# Patient Record
Sex: FEMALE | Race: WHITE | NOT HISPANIC OR LATINO | Employment: OTHER | URBAN - METROPOLITAN AREA
[De-identification: names, ages, dates, MRNs, and addresses within clinical notes are randomized per-mention and may not be internally consistent; named-entity substitution may affect disease eponyms.]

---

## 2017-04-13 ENCOUNTER — HOSPITAL ENCOUNTER (OUTPATIENT)
Dept: LAB | Facility: MEDICAL CENTER | Age: 77
End: 2017-04-13
Attending: NURSE PRACTITIONER
Payer: MEDICARE

## 2017-04-13 LAB
25(OH)D3 SERPL-MCNC: 30 NG/ML (ref 30–100)
ALBUMIN SERPL BCP-MCNC: 3.6 G/DL (ref 3.2–4.9)
ALBUMIN/GLOB SERPL: 1.3 G/DL
ALP SERPL-CCNC: 73 U/L (ref 30–99)
ALT SERPL-CCNC: 8 U/L (ref 2–50)
ANION GAP SERPL CALC-SCNC: 10 MMOL/L (ref 0–11.9)
AST SERPL-CCNC: 16 U/L (ref 12–45)
BASOPHILS # BLD AUTO: 1.6 % (ref 0–1.8)
BASOPHILS # BLD: 0.06 K/UL (ref 0–0.12)
BILIRUB SERPL-MCNC: 0.7 MG/DL (ref 0.1–1.5)
BUN SERPL-MCNC: 23 MG/DL (ref 8–22)
CALCIUM SERPL-MCNC: 9.1 MG/DL (ref 8.5–10.5)
CHLORIDE SERPL-SCNC: 108 MMOL/L (ref 96–112)
CHOLEST SERPL-MCNC: 163 MG/DL (ref 100–199)
CO2 SERPL-SCNC: 27 MMOL/L (ref 20–33)
CREAT SERPL-MCNC: 0.88 MG/DL (ref 0.5–1.4)
EOSINOPHIL # BLD AUTO: 0.17 K/UL (ref 0–0.51)
EOSINOPHIL NFR BLD: 4.4 % (ref 0–6.9)
ERYTHROCYTE [DISTWIDTH] IN BLOOD BY AUTOMATED COUNT: 41 FL (ref 35.9–50)
GFR SERPL CREATININE-BSD FRML MDRD: >60 ML/MIN/1.73 M 2
GLOBULIN SER CALC-MCNC: 2.7 G/DL (ref 1.9–3.5)
GLUCOSE SERPL-MCNC: 82 MG/DL (ref 65–99)
HCT VFR BLD AUTO: 39 % (ref 37–47)
HDLC SERPL-MCNC: 52 MG/DL
HGB BLD-MCNC: 12.7 G/DL (ref 12–16)
IMM GRANULOCYTES # BLD AUTO: 0 K/UL (ref 0–0.11)
IMM GRANULOCYTES NFR BLD AUTO: 0 % (ref 0–0.9)
LDLC SERPL CALC-MCNC: 97 MG/DL
LYMPHOCYTES # BLD AUTO: 1.13 K/UL (ref 1–4.8)
LYMPHOCYTES NFR BLD: 29.5 % (ref 22–41)
MCH RBC QN AUTO: 29.3 PG (ref 27–33)
MCHC RBC AUTO-ENTMCNC: 32.6 G/DL (ref 33.6–35)
MCV RBC AUTO: 90.1 FL (ref 81.4–97.8)
MONOCYTES # BLD AUTO: 0.33 K/UL (ref 0–0.85)
MONOCYTES NFR BLD AUTO: 8.6 % (ref 0–13.4)
NEUTROPHILS # BLD AUTO: 2.14 K/UL (ref 2–7.15)
NEUTROPHILS NFR BLD: 55.9 % (ref 44–72)
NRBC # BLD AUTO: 0 K/UL
NRBC BLD AUTO-RTO: 0 /100 WBC
PLATELET # BLD AUTO: 296 K/UL (ref 164–446)
PMV BLD AUTO: 9.7 FL (ref 9–12.9)
POTASSIUM SERPL-SCNC: 3.4 MMOL/L (ref 3.6–5.5)
PROT SERPL-MCNC: 6.3 G/DL (ref 6–8.2)
RBC # BLD AUTO: 4.33 M/UL (ref 4.2–5.4)
SODIUM SERPL-SCNC: 145 MMOL/L (ref 135–145)
TRIGL SERPL-MCNC: 68 MG/DL (ref 0–149)
TSH SERPL DL<=0.005 MIU/L-ACNC: 2.52 UIU/ML (ref 0.3–3.7)
WBC # BLD AUTO: 3.8 K/UL (ref 4.8–10.8)

## 2017-04-13 PROCEDURE — 80061 LIPID PANEL: CPT

## 2017-04-13 PROCEDURE — 36415 COLL VENOUS BLD VENIPUNCTURE: CPT

## 2017-04-13 PROCEDURE — 85025 COMPLETE CBC W/AUTO DIFF WBC: CPT

## 2017-04-13 PROCEDURE — 80053 COMPREHEN METABOLIC PANEL: CPT

## 2017-04-13 PROCEDURE — 82306 VITAMIN D 25 HYDROXY: CPT

## 2017-04-13 PROCEDURE — 84443 ASSAY THYROID STIM HORMONE: CPT

## 2017-05-22 ENCOUNTER — HOSPITAL ENCOUNTER (OUTPATIENT)
Dept: LAB | Facility: MEDICAL CENTER | Age: 77
End: 2017-05-22
Attending: NURSE PRACTITIONER
Payer: MEDICARE

## 2017-05-22 LAB
BASOPHILS # BLD AUTO: 1.4 % (ref 0–1.8)
BASOPHILS # BLD: 0.08 K/UL (ref 0–0.12)
EOSINOPHIL # BLD AUTO: 0.13 K/UL (ref 0–0.51)
EOSINOPHIL NFR BLD: 2.3 % (ref 0–6.9)
ERYTHROCYTE [DISTWIDTH] IN BLOOD BY AUTOMATED COUNT: 41.7 FL (ref 35.9–50)
HCT VFR BLD AUTO: 39.8 % (ref 37–47)
HGB BLD-MCNC: 13.1 G/DL (ref 12–16)
IMM GRANULOCYTES # BLD AUTO: 0.01 K/UL (ref 0–0.11)
IMM GRANULOCYTES NFR BLD AUTO: 0.2 % (ref 0–0.9)
LYMPHOCYTES # BLD AUTO: 1.26 K/UL (ref 1–4.8)
LYMPHOCYTES NFR BLD: 22.6 % (ref 22–41)
MCH RBC QN AUTO: 29.8 PG (ref 27–33)
MCHC RBC AUTO-ENTMCNC: 32.9 G/DL (ref 33.6–35)
MCV RBC AUTO: 90.5 FL (ref 81.4–97.8)
MONOCYTES # BLD AUTO: 0.39 K/UL (ref 0–0.85)
MONOCYTES NFR BLD AUTO: 7 % (ref 0–13.4)
NEUTROPHILS # BLD AUTO: 3.7 K/UL (ref 2–7.15)
NEUTROPHILS NFR BLD: 66.5 % (ref 44–72)
NRBC # BLD AUTO: 0 K/UL
NRBC BLD AUTO-RTO: 0 /100 WBC
PLATELET # BLD AUTO: 291 K/UL (ref 164–446)
PMV BLD AUTO: 9.8 FL (ref 9–12.9)
POTASSIUM SERPL-SCNC: 3.6 MMOL/L (ref 3.6–5.5)
RBC # BLD AUTO: 4.4 M/UL (ref 4.2–5.4)
WBC # BLD AUTO: 5.6 K/UL (ref 4.8–10.8)

## 2017-05-22 PROCEDURE — 85025 COMPLETE CBC W/AUTO DIFF WBC: CPT

## 2017-05-22 PROCEDURE — 36415 COLL VENOUS BLD VENIPUNCTURE: CPT

## 2017-05-22 PROCEDURE — 84132 ASSAY OF SERUM POTASSIUM: CPT

## 2017-05-26 ENCOUNTER — OFFICE VISIT (OUTPATIENT)
Dept: URGENT CARE | Facility: CLINIC | Age: 77
End: 2017-05-26
Payer: MEDICARE

## 2017-05-26 ENCOUNTER — HOSPITAL ENCOUNTER (EMERGENCY)
Facility: MEDICAL CENTER | Age: 77
End: 2017-05-26
Payer: MEDICARE

## 2017-05-26 VITALS
SYSTOLIC BLOOD PRESSURE: 128 MMHG | OXYGEN SATURATION: 99 % | WEIGHT: 205 LBS | BODY MASS INDEX: 31.18 KG/M2 | RESPIRATION RATE: 16 BRPM | TEMPERATURE: 98.3 F | DIASTOLIC BLOOD PRESSURE: 70 MMHG | HEART RATE: 88 BPM

## 2017-05-26 VITALS
TEMPERATURE: 97.9 F | DIASTOLIC BLOOD PRESSURE: 65 MMHG | BODY MASS INDEX: 31.11 KG/M2 | WEIGHT: 205.25 LBS | RESPIRATION RATE: 18 BRPM | SYSTOLIC BLOOD PRESSURE: 143 MMHG | HEART RATE: 84 BPM | OXYGEN SATURATION: 89 % | HEIGHT: 68 IN

## 2017-05-26 DIAGNOSIS — I10 ESSENTIAL HYPERTENSION: ICD-10-CM

## 2017-05-26 PROCEDURE — 1036F TOBACCO NON-USER: CPT | Performed by: NURSE PRACTITIONER

## 2017-05-26 PROCEDURE — 4040F PNEUMOC VAC/ADMIN/RCVD: CPT | Mod: 8P | Performed by: NURSE PRACTITIONER

## 2017-05-26 PROCEDURE — G8419 CALC BMI OUT NRM PARAM NOF/U: HCPCS | Performed by: NURSE PRACTITIONER

## 2017-05-26 PROCEDURE — G8432 DEP SCR NOT DOC, RNG: HCPCS | Performed by: NURSE PRACTITIONER

## 2017-05-26 PROCEDURE — 302449 STATCHG TRIAGE ONLY (STATISTIC)

## 2017-05-26 PROCEDURE — 1101F PT FALLS ASSESS-DOCD LE1/YR: CPT | Mod: 8P | Performed by: NURSE PRACTITIONER

## 2017-05-26 PROCEDURE — 99214 OFFICE O/P EST MOD 30 MIN: CPT | Performed by: NURSE PRACTITIONER

## 2017-05-26 RX ORDER — CLONIDINE HYDROCHLORIDE 0.1 MG/1
0.1 TABLET ORAL ONCE
Status: COMPLETED | OUTPATIENT
Start: 2017-05-26 | End: 2017-05-26

## 2017-05-26 RX ADMIN — CLONIDINE HYDROCHLORIDE 0.1 MG: 0.1 TABLET ORAL at 16:56

## 2017-05-26 ASSESSMENT — ENCOUNTER SYMPTOMS
SHORTNESS OF BREATH: 0
FEVER: 0
TREMORS: 0
DIZZINESS: 0
HEADACHES: 0
SENSORY CHANGE: 0
LOSS OF CONSCIOUSNESS: 0
NAUSEA: 0
FOCAL WEAKNESS: 0
SPEECH CHANGE: 0
VOMITING: 0
MUSCULOSKELETAL NEGATIVE: 1
CHILLS: 0
SEIZURES: 0
TINGLING: 0

## 2017-05-26 ASSESSMENT — PAIN SCALES - GENERAL: PAINLEVEL_OUTOF10: 0

## 2017-05-26 NOTE — PROGRESS NOTES
Subjective:      Olesya Harmon is a 76 y.o. female who presents with Other    Past Medical History   Diagnosis Date   • Hypercholesteremia    • Hypertension    • Arthritis    • CATARACT      Social History     Social History   • Marital Status:      Spouse Name: N/A   • Number of Children: N/A   • Years of Education: N/A     Occupational History   • Not on file.     Social History Main Topics   • Smoking status: Never Smoker    • Smokeless tobacco: Never Used   • Alcohol Use: No      Comment: occ.   • Drug Use: No   • Sexual Activity: Not on file     Other Topics Concern   • Not on file     Social History Narrative     Family hx: not pertinent to today's visit    Allergies: Codeine    This patient is a 76 year old female who presents today with concern for her blood pressure. Her admission blood pressure was 162/70. Patient is currently on losartan 100 mg daily. She is no longer taking hydrochlorothiazide. She states that she was previously on atenolol and this was discontinued. Patient feels that her blood pressure was controlled better with the atenolol. Her medications were changed and the early part of April. Patient believes that over the last 1-2 weeks her blood pressure has been increasing. She has a follow-up appointment with her primary physician on June 8, and states that she has not contacted them to let them know that she is having difficulties with her blood pressure. Patient denies chest pain or shortness of breath. She denies headaches.        Other  This is a chronic problem. The problem occurs intermittently. The problem has been waxing and waning. Pertinent negatives include no chest pain, chills, fever, headaches, nausea or vomiting. Nothing aggravates the symptoms. She has tried nothing for the symptoms. The treatment provided no relief.       Review of Systems   Constitutional: Negative for fever and chills.   Respiratory: Negative for shortness of breath.    Cardiovascular: Negative  for chest pain.   Gastrointestinal: Negative for nausea and vomiting.   Genitourinary: Negative.    Musculoskeletal: Negative.    Neurological: Negative for dizziness, tingling, tremors, sensory change, speech change, focal weakness, seizures, loss of consciousness and headaches.       All other systems reviewed and are negative      Objective:     /70 mmHg  Pulse 88  Temp(Src) 36.8 °C (98.3 °F)  Resp 16  Wt 92.987 kg (205 lb)  SpO2 99%     Physical Exam   Constitutional: She is oriented to person, place, and time. She appears well-developed and well-nourished. No distress.   HENT:   Head: Normocephalic.   Nose: Nose normal.   Mouth/Throat: Oropharynx is clear and moist. No oropharyngeal exudate.   Eyes: Conjunctivae and EOM are normal. Pupils are equal, round, and reactive to light. Right eye exhibits no discharge. Left eye exhibits no discharge.   Neck: Normal range of motion. Neck supple.   No carotid bruits   Cardiovascular: Normal rate, regular rhythm and normal heart sounds.  Exam reveals no gallop and no friction rub.    No murmur heard.  Pulmonary/Chest: Effort normal and breath sounds normal.   Musculoskeletal: Normal range of motion.   Neurological: She is alert and oriented to person, place, and time.   Skin: Skin is warm and dry. She is not diaphoretic.   Psychiatric: She has a normal mood and affect. Her behavior is normal. Judgment and thought content normal.   Vitals reviewed.      Clonidine 0.1 mg PO given at 16:10; blood pressure down to 128/70 at 16:45              Assessment/Plan:   Hypertension   -continue present medications   -Add back HCTZ 12.5 mg daily for BP >160/90 only   -Follow up with PCP on Tuesday of next week   -ER precautions for chest pain, sob.   There are no diagnoses linked to this encounter.

## 2017-05-26 NOTE — MR AVS SNAPSHOT
Olesya Harmon   2017 3:45 PM   Office Visit   MRN: [id number]    Department:  [facility]   Dept Phone:  [phone]    Description:  Female : 1940   Provider:  Cathey J Hamman, A.P.N.           Reason for Visit     Other recent blood pressure medication changes, now she feel her bp is rising      Allergies as of 2017     Allergen Noted Reactions    Codeine 2008   Vomiting      You were diagnosed with     Essential hypertension   [[id number]]         Vital Signs     Blood Pressure Pulse Temperature Respirations Weight Oxygen Saturation    128/70 mmHg 88 36.8 °C (98.3 °F) 16 92.987 kg (205 lb) 99%    Smoking Status                   Never Smoker            Basic Information     Date Of Birth Sex Race Ethnicity Preferred Language    1940 Female White Non- English      Health Maintenance        Date Due Completion Dates    IMM DTaP/Tdap/Td Vaccine (1 - Tdap) 1959 ---    PAP SMEAR 1961 ---    COLONOSCOPY 1990 ---    IMM ZOSTER VACCINE 2000 ---    BONE DENSITY 2005 ---    IMM PNEUMOCOCCAL 65+ (ADULT) LOW/MEDIUM RISK SERIES (1 of 2 - PCV13) 2005 ---    MAMMOGRAM 2010, 2009, 2008, 2008, 11/15/2007, 11/15/2007, 2006, 2005, 2004            Current Immunizations     Pneumococcal Vaccine (UF)Historical Data 10/21/2009      Below and/or attached are the medications your provider expects you to take. Review all of your home medications and newly ordered medications with your provider and/or pharmacist. Follow medication instructions as directed by your provider and/or pharmacist. Please keep your medication list with you and share with your provider. Update the information when medications are discontinued, doses are changed, or new medications (including over-the-counter products) are added; and carry medication information at all times in the event of emergency situations     Allergies:   CODEINE - Vomiting               Medications  Valid as of: May 26, 2017 -  4:47 PM    Generic Name Brand Name Tablet Size Instructions for use    Aspirin (Tablet Delayed Response) ECOTRIN 81 MG Take 81 mg by mouth every day.        Cholecalciferol (Cap) Vitamin D 2000 UNITS Take 1 Cap by mouth every day.        Garlic (Cap) Garlic 1000 MG Take 1 Cap by mouth every day.        HydroCHLOROthiazide (Cap) MICROZIDE 12.5 MG Take 12.5 mg by mouth every day.        Losartan Potassium (Tab) COZAAR 50 MG Take 50 mg by mouth every day.        Lovastatin (Tab) MEVACOR 40 MG Take 80 mg by mouth every day.        Pantoprazole Sodium (Tablet Delayed Response) PROTONIX 40 MG Take 40 mg by mouth every day.        Potassium Chloride (Cap CR) MICRO-K 8 MEQ Take 8 mEq by mouth every day. 1 daily for 14 days, then twice weekly        .                 Medicines prescribed today were sent to:     [facility] [address] [place] [address], [place] - [address]    [address] [facility] [place] [address]    Phone: [phone] Fax: [phone]    Open 24 Hours?: No      Medication refill instructions:       If your prescription bottle indicates you have medication refills left, it is not necessary to call your provider’s office. Please contact your pharmacy and they will refill your medication.    If your prescription bottle indicates you do not have any refills left, you may request refills at any time through one of the following ways: The online [employer] system (except Urgent Care), by calling your provider’s office, or by asking your pharmacy to contact your provider’s office with a refill request. Medication refills are processed only during regular business hours and may not be available until the next business day. Your provider may request additional information or to have a follow-up visit with you prior to refilling your medication.   *Please Note: Medication refills are assigned a new Rx number when refilled electronically. Your  pharmacy may indicate that no refills were authorized even though a new prescription for the same medication is available at the pharmacy. Please request the medicine by name with the pharmacy before contacting your provider for a refill.           MyChart Status: Patient Declined

## 2017-05-26 NOTE — ED NOTES
Reports a recent change in her antihypertensive regimen.  Pt presents complaining of hypertension.

## 2017-07-31 ENCOUNTER — HOSPITAL ENCOUNTER (OUTPATIENT)
Dept: LAB | Facility: MEDICAL CENTER | Age: 77
End: 2017-07-31
Attending: ORTHOPAEDIC SURGERY
Payer: MEDICARE

## 2017-07-31 PROCEDURE — 83018 HEAVY METAL QUAN EACH NES: CPT

## 2017-07-31 PROCEDURE — 82495 ASSAY OF CHROMIUM: CPT

## 2017-07-31 PROCEDURE — 36415 COLL VENOUS BLD VENIPUNCTURE: CPT

## 2017-08-02 LAB
TEST NAME 95000: ABNORMAL
TEST NAME 95000: NORMAL

## 2017-08-31 ENCOUNTER — HOSPITAL ENCOUNTER (EMERGENCY)
Facility: MEDICAL CENTER | Age: 77
End: 2017-08-31
Attending: EMERGENCY MEDICINE
Payer: MEDICARE

## 2017-08-31 ENCOUNTER — APPOINTMENT (OUTPATIENT)
Dept: RADIOLOGY | Facility: MEDICAL CENTER | Age: 77
End: 2017-08-31
Attending: EMERGENCY MEDICINE
Payer: MEDICARE

## 2017-08-31 VITALS
SYSTOLIC BLOOD PRESSURE: 139 MMHG | DIASTOLIC BLOOD PRESSURE: 61 MMHG | HEIGHT: 68 IN | OXYGEN SATURATION: 97 % | TEMPERATURE: 97.9 F | HEART RATE: 79 BPM | BODY MASS INDEX: 31.34 KG/M2 | WEIGHT: 206.79 LBS | RESPIRATION RATE: 16 BRPM

## 2017-08-31 DIAGNOSIS — M54.50 ACUTE BILATERAL LOW BACK PAIN WITHOUT SCIATICA: ICD-10-CM

## 2017-08-31 LAB
APPEARANCE UR: CLEAR
COLOR UR: YELLOW
GLUCOSE UR STRIP.AUTO-MCNC: NEGATIVE MG/DL
KETONES UR STRIP.AUTO-MCNC: NEGATIVE MG/DL
LEUKOCYTE ESTERASE UR QL STRIP.AUTO: ABNORMAL
NITRITE UR QL STRIP.AUTO: NEGATIVE
PH UR STRIP.AUTO: 6 [PH]
PROT UR QL STRIP: NEGATIVE MG/DL
RBC UR QL AUTO: NEGATIVE
SP GR UR STRIP.AUTO: <=1.005

## 2017-08-31 PROCEDURE — 81002 URINALYSIS NONAUTO W/O SCOPE: CPT

## 2017-08-31 PROCEDURE — 700111 HCHG RX REV CODE 636 W/ 250 OVERRIDE (IP)

## 2017-08-31 PROCEDURE — 72170 X-RAY EXAM OF PELVIS: CPT

## 2017-08-31 PROCEDURE — 99284 EMERGENCY DEPT VISIT MOD MDM: CPT

## 2017-08-31 PROCEDURE — 72100 X-RAY EXAM L-S SPINE 2/3 VWS: CPT

## 2017-08-31 RX ORDER — PREDNISONE 20 MG/1
40 TABLET ORAL DAILY
Qty: 10 TAB | Refills: 0 | Status: SHIPPED | OUTPATIENT
Start: 2017-08-31 | End: 2017-09-05

## 2017-08-31 RX ORDER — PREDNISONE 20 MG/1
40 TABLET ORAL ONCE
Status: DISCONTINUED | OUTPATIENT
Start: 2017-08-31 | End: 2017-08-31

## 2017-08-31 RX ORDER — POTASSIUM CHLORIDE 750 MG/1
10 TABLET, EXTENDED RELEASE ORAL DAILY
Status: ON HOLD | COMMUNITY
End: 2018-01-16

## 2017-08-31 RX ORDER — LOSARTAN POTASSIUM 100 MG/1
100 TABLET ORAL DAILY
Status: SHIPPED | COMMUNITY
End: 2018-06-04 | Stop reason: SDUPTHER

## 2017-08-31 RX ORDER — AMLODIPINE BESYLATE 2.5 MG/1
2.5 TABLET ORAL DAILY
Status: SHIPPED | COMMUNITY
End: 2018-05-31 | Stop reason: SDUPTHER

## 2017-08-31 RX ORDER — PREDNISONE 20 MG/1
TABLET ORAL
Status: COMPLETED
Start: 2017-08-31 | End: 2017-08-31

## 2017-08-31 RX ADMIN — PREDNISONE 40 MG: 20 TABLET ORAL at 10:19

## 2017-08-31 ASSESSMENT — PAIN SCALES - GENERAL: PAINLEVEL_OUTOF10: 6

## 2017-08-31 NOTE — DISCHARGE INSTRUCTIONS
Back Pain, Adult  Back pain is very common in adults. The cause of back pain is rarely dangerous and the pain often gets better over time. The cause of your back pain may not be known. Some common causes of back pain include:  · Strain of the muscles or ligaments supporting the spine.  · Wear and tear (degeneration) of the spinal disks.  · Arthritis.  · Direct injury to the back.  For many people, back pain may return. Since back pain is rarely dangerous, most people can learn to manage this condition on their own.  HOME CARE INSTRUCTIONS  Watch your back pain for any changes. The following actions may help to lessen any discomfort you are feeling:  · Remain active. It is stressful on your back to sit or  one place for long periods of time. Do not sit, drive, or  one place for more than 30 minutes at a time. Take short walks on even surfaces as soon as you are able. Try to increase the length of time you walk each day.  · Exercise regularly as directed by your health care provider. Exercise helps your back heal faster. It also helps avoid future injury by keeping your muscles strong and flexible.  · Do not stay in bed. Resting more than 1-2 days can delay your recovery.  · Pay attention to your body when you bend and lift. The most comfortable positions are those that put less stress on your recovering back. Always use proper lifting techniques, including:  ¨ Bending your knees.  ¨ Keeping the load close to your body.  ¨ Avoiding twisting.  · Find a comfortable position to sleep. Use a firm mattress and lie on your side with your knees slightly bent. If you lie on your back, put a pillow under your knees.  · Avoid feeling anxious or stressed. Stress increases muscle tension and can worsen back pain. It is important to recognize when you are anxious or stressed and learn ways to manage it, such as with exercise.  · Take medicines only as directed by your health care provider. Over-the-counter  medicines to reduce pain and inflammation are often the most helpful. Your health care provider may prescribe muscle relaxant drugs. These medicines help dull your pain so you can more quickly return to your normal activities and healthy exercise.  · Apply ice to the injured area:  ¨ Put ice in a plastic bag.  ¨ Place a towel between your skin and the bag.  ¨ Leave the ice on for 20 minutes, 2-3 times a day for the first 2-3 days. After that, ice and heat may be alternated to reduce pain and spasms.  · Maintain a healthy weight. Excess weight puts extra stress on your back and makes it difficult to maintain good posture.  SEEK MEDICAL CARE IF:  · You have pain that is not relieved with rest or medicine.  · You have increasing pain going down into the legs or buttocks.  · You have pain that does not improve in one week.  · You have night pain.  · You lose weight.  · You have a fever or chills.  SEEK IMMEDIATE MEDICAL CARE IF:   · You develop new bowel or bladder control problems.  · You have unusual weakness or numbness in your arms or legs.  · You develop nausea or vomiting.  · You develop abdominal pain.  · You feel faint.     This information is not intended to replace advice given to you by your health care provider. Make sure you discuss any questions you have with your health care provider.     Document Released: 12/18/2006 Document Revised: 01/08/2016 Document Reviewed: 04/21/2015  [employer] Patient Education ©2016 [employer]

## 2017-08-31 NOTE — ED NOTES
Urine sample provided, POC preformed, and sample taken to lab. X ray taking patient to for imaging.

## 2017-08-31 NOTE — ED PROVIDER NOTES
ED Provider Note    CHIEF COMPLAINT  Chief Complaint   Patient presents with   • Low Back Pain     low back pain started on tuesday       HPI  Olesya Harmon is a 76 y.o. female who presents to the emergency department complaining of low back pain. She states that she [place] [employer] [place]  and got a car and had back pain associated with it. She states the pain increased in severity when she woke up the next day. The pain is located in the bilateral lower lumbar and sacral area. The pain increases with ambulation and decreases with rest, she denies any saddle anesthesia, fevers, recent surgery, use of blood anticoagulation, history of low back pain, painful urination, decreased strength or sensation to her lower extremities. Denies any abdominal pain, history of aneurysm, history of back surgery.  REVIEW OF SYSTEMS  Positives as above. Pertinent negatives include saddle anesthesia, chest pain, diarrhea, hematochezia, melena.  All other review of systems are negative    PAST MEDICAL HISTORY  Past Medical History:   Diagnosis Date   • Arthritis    • CATARACT    • Hypercholesteremia    • Hypertension        FAMILY HISTORY  Noncontributory    SOCIAL HISTORY  Social History     Social History   • Marital status:      Spouse name: N/A   • Number of children: N/A   • Years of education: N/A     Social History Main Topics   • Smoking status: Never Smoker   • Smokeless tobacco: Never Used   • Alcohol use No      Comment: occ.   • Drug use: No   • Sexual activity: Not on file     Other Topics Concern   • Not on file     Social History Narrative   • No narrative on file       SURGICAL HISTORY  Past Surgical History:   Procedure Laterality Date   • CATARACT PHACO WITH IOL  5/9/2013    Performed by Haja Toussaint M.D. at SURGERY [facility]   • HIP ARTHROPLASTY TOTAL  11/21/2011    Performed by ANH HIRSCH at SURGERY [facility]   • CARPAL TUNNEL REL     • OTHER ORTHOPEDIC SURGERY       "right knee replacement       CURRENT MEDICATIONS  Home Medications     Reviewed by Tayla Fang (Pharmacy Tech) on 08/31/17 at 0948  Med List Status: Complete   Medication Last Dose Status   amlodipine (NORVASC) 2.5 MG Tab 8/31/2017 Active   aspirin EC (ECOTRIN) 81 MG TBEC 8/31/2017 Active   Garlic 1000 MG Cap 8/31/2017 Active   losartan (COZAAR) 100 MG Tab 8/31/2017 Active   LOVASTATIN 40 MG PO TABS 8/30/2017 Active   pantoprazole (PROTONIX) 40 MG Tablet Delayed Response 8/31/2017 Active   potassium chloride SA (K-DUR) 10 MEQ Tab CR 8/31/2017 Active   vitamin D (CHOLECALCIFEROL) 1000 UNIT Tab 8/31/2017 Active                ALLERGIES  Allergies   Allergen Reactions   • Codeine Vomiting       PHYSICAL EXAM  VITAL SIGNS: /61   Pulse 79   Temp 36.6 °C (97.9 °F)   Resp 16   Ht 1.727 m (5' 8\")   Wt 93.8 kg (206 lb 12.7 oz)   SpO2 97%   BMI 31.44 kg/m²    Constitutional: Well developed, Well nourished, No acute distress, Non-toxic appearance.   Eyes: PERRLA, EOMI, Conjunctiva normal, No discharge.   Thorax & Lungs:  No respiratory distress, no rales, no rhonchi, No wheezing, No chest wall tenderness.   Abdomen: Bowel sounds normal, Soft, No tenderness, No guarding, No rebound, No pulsatile masses.   Skin: Warm, Dry, No erythema, No rash, no petechia, no purpura   Back: Bilateral paravertebral muscle tenderness and spasm in the lumbar region, slight sacral tenderness bilaterally no step-off deformity, no midline tenderness   Extremities: Full range of motion, no deformity, no edema, no pain with leg lift bilaterally.  Neurologic: Alert & oriented x 3, no saddle anesthesia, leg lift 5/5 bilaterally, DTRs are 2/4 L4-S1 bilaterally, sensation intact throughout the lower extremities bilaterally  Psychiatric: Affect normal for clinical presentation.      RADIOLOGY/PROCEDURES  DX-PELVIS-1 OR 2 VIEWS   Final Result      1.  Postsurgical change consistent with right hip arthroplasty. No evidence of " fracture or displacement of the arthroplasty components. There is faint curvilinear hyperdensity seen surrounding the right hip arthroplasty as well as some erosive change of    the right proximal femur medially. These findings suggest presence of metallosis.      2.  Severe degenerative change of the left hip.      DX-LUMBAR SPINE-2 OR 3 VIEWS   Final Result      1.  Multilevel degenerative disc disease and facet degeneration.      2.  Convex left scoliotic curvature.            COURSE & MEDICAL DECISION MAKING  Pertinent Labs & Imaging studies reviewed. (See chart for details)  This is a [facility] 76-year-old female presents with low back pain. The patient has no evidence of cauda equina syndrome, epidural abscess or epidural hematoma red flags. X-rays completed for possible occult fracture that is negative. The patient will be taking Tylenol. In addition, she has a normal urinalysis. The patient has no abdominal tenderness and do not suspect aortic aneurysm, aortic dissection, renal artery thrombosis. The patient will be following the primary care physician and received the blow medications for inflammation and spasm.    Discharge Medication List as of 8/31/2017 10:25 AM      START taking these medications    Details   predniSONE (DELTASONE) 20 MG Tab Take 2 Tabs by mouth every day for 5 days., Disp-10 Tab, R-0, Print Rx Paper             FINAL IMPRESSION     1. Acute bilateral low back pain without sciatica              The patient will return for new or worsening symptoms and is stable at the time of discharge.    The patient is referred to a primary physician for blood pressure management, diabetic screening, and for all other preventative health concerns.    DISPOSITION:  Patient will be discharged home in stable condition.    FOLLOW UP:  [address] [phone]  Call  If symptoms worsen    Mackenzie Perez, F.N.P.  [address]  [place]  [address]  [phone]    Schedule an appointment as soon as possible for a visit in 3 days        OUTPATIENT MEDICATIONS:  Discharge Medication List as of 8/31/2017 10:25 AM      START taking these medications    Details   predniSONE (DELTASONE) 20 MG Tab Take 2 Tabs by mouth every day for 5 days., Disp-10 Tab, R-0, Print Rx Paper               Electronically signed by: Irving Hudson, 8/31/2017 8:56 AM

## 2017-08-31 NOTE — ED NOTES
Med Rec completed per patient with medication list  Allergies reviewed  No ORAL antibiotics in last 30 days

## 2017-09-29 ENCOUNTER — HOSPITAL ENCOUNTER (OUTPATIENT)
Dept: LAB | Facility: MEDICAL CENTER | Age: 77
End: 2017-09-29
Attending: NURSE PRACTITIONER
Payer: MEDICARE

## 2017-09-29 LAB — POTASSIUM SERPL-SCNC: 3.9 MMOL/L (ref 3.6–5.5)

## 2017-09-29 PROCEDURE — 84132 ASSAY OF SERUM POTASSIUM: CPT

## 2017-09-29 PROCEDURE — 36415 COLL VENOUS BLD VENIPUNCTURE: CPT

## 2017-11-27 ENCOUNTER — APPOINTMENT (OUTPATIENT)
Dept: SOCIAL WORK | Facility: CLINIC | Age: 77
End: 2017-11-27
Payer: MEDICARE

## 2017-11-27 PROCEDURE — G0008 ADMIN INFLUENZA VIRUS VAC: HCPCS | Performed by: REGISTERED NURSE

## 2017-11-27 PROCEDURE — 90670 PCV13 VACCINE IM: CPT | Performed by: REGISTERED NURSE

## 2017-11-27 PROCEDURE — G0009 ADMIN PNEUMOCOCCAL VACCINE: HCPCS | Performed by: REGISTERED NURSE

## 2017-11-27 PROCEDURE — 90662 IIV NO PRSV INCREASED AG IM: CPT | Performed by: REGISTERED NURSE

## 2017-12-06 ENCOUNTER — HOSPITAL ENCOUNTER (OUTPATIENT)
Dept: LAB | Facility: MEDICAL CENTER | Age: 77
End: 2017-12-06
Attending: NURSE PRACTITIONER
Payer: MEDICARE

## 2017-12-06 LAB — POTASSIUM SERPL-SCNC: 3.4 MMOL/L (ref 3.6–5.5)

## 2017-12-06 PROCEDURE — 84132 ASSAY OF SERUM POTASSIUM: CPT

## 2017-12-06 PROCEDURE — 36415 COLL VENOUS BLD VENIPUNCTURE: CPT

## 2017-12-28 ENCOUNTER — HOSPITAL ENCOUNTER (OUTPATIENT)
Dept: LAB | Facility: MEDICAL CENTER | Age: 77
End: 2017-12-28
Attending: NURSE PRACTITIONER
Payer: MEDICARE

## 2017-12-28 LAB — POTASSIUM SERPL-SCNC: 4 MMOL/L (ref 3.6–5.5)

## 2017-12-28 PROCEDURE — 36415 COLL VENOUS BLD VENIPUNCTURE: CPT

## 2017-12-28 PROCEDURE — 84132 ASSAY OF SERUM POTASSIUM: CPT

## 2018-01-05 ENCOUNTER — HOSPITAL ENCOUNTER (OUTPATIENT)
Dept: LAB | Facility: MEDICAL CENTER | Age: 78
End: 2018-01-05
Attending: ORTHOPAEDIC SURGERY
Payer: MEDICARE

## 2018-01-05 LAB
CRP SERPL HS-MCNC: 0.43 MG/DL (ref 0–0.75)
ERYTHROCYTE [SEDIMENTATION RATE] IN BLOOD BY WESTERGREN METHOD: 12 MM/HOUR (ref 0–30)

## 2018-01-05 PROCEDURE — 36415 COLL VENOUS BLD VENIPUNCTURE: CPT

## 2018-01-05 PROCEDURE — 82495 ASSAY OF CHROMIUM: CPT

## 2018-01-05 PROCEDURE — 86140 C-REACTIVE PROTEIN: CPT

## 2018-01-05 PROCEDURE — 85652 RBC SED RATE AUTOMATED: CPT

## 2018-01-05 PROCEDURE — 83018 HEAVY METAL QUAN EACH NES: CPT

## 2018-01-05 PROCEDURE — 86480 TB TEST CELL IMMUN MEASURE: CPT

## 2018-01-07 LAB
M TB TUBERC IFN-G BLD QL: NEGATIVE
M TB TUBERC IFN-G/MITOGEN IGNF BLD: 0.01
M TB TUBERC IGNF/MITOGEN IGNF CONTROL: 50.86 [IU]/ML
MITOGEN IGNF BCKGRD COR BLD-ACNC: 0.03 [IU]/ML

## 2018-01-08 LAB — TEST NAME 95000: NORMAL

## 2018-01-09 LAB — TEST NAME 95000: ABNORMAL

## 2018-01-16 ENCOUNTER — APPOINTMENT (OUTPATIENT)
Dept: RADIOLOGY | Facility: MEDICAL CENTER | Age: 78
End: 2018-01-16
Attending: ORTHOPAEDIC SURGERY
Payer: MEDICARE

## 2018-01-16 ENCOUNTER — HOSPITAL ENCOUNTER (OUTPATIENT)
Facility: MEDICAL CENTER | Age: 78
End: 2018-01-16
Attending: ORTHOPAEDIC SURGERY | Admitting: ORTHOPAEDIC SURGERY
Payer: MEDICARE

## 2018-01-16 VITALS
HEART RATE: 75 BPM | SYSTOLIC BLOOD PRESSURE: 138 MMHG | TEMPERATURE: 97.3 F | DIASTOLIC BLOOD PRESSURE: 62 MMHG | OXYGEN SATURATION: 98 % | WEIGHT: 205.47 LBS | RESPIRATION RATE: 16 BRPM | HEIGHT: 68 IN | BODY MASS INDEX: 31.14 KG/M2

## 2018-01-16 LAB
ANION GAP SERPL CALC-SCNC: 9 MMOL/L (ref 0–11.9)
BUN SERPL-MCNC: 22 MG/DL (ref 8–22)
CALCIUM SERPL-MCNC: 9.2 MG/DL (ref 8.5–10.5)
CHLORIDE SERPL-SCNC: 107 MMOL/L (ref 96–112)
CO2 SERPL-SCNC: 23 MMOL/L (ref 20–33)
CREAT SERPL-MCNC: 0.82 MG/DL (ref 0.5–1.4)
EKG IMPRESSION: NORMAL
GLUCOSE SERPL-MCNC: 117 MG/DL (ref 65–99)
POTASSIUM SERPL-SCNC: 3.8 MMOL/L (ref 3.6–5.5)
SODIUM SERPL-SCNC: 139 MMOL/L (ref 135–145)

## 2018-01-16 PROCEDURE — 80048 BASIC METABOLIC PNL TOTAL CA: CPT

## 2018-01-16 PROCEDURE — 160002 HCHG RECOVERY MINUTES (STAT): Performed by: ORTHOPAEDIC SURGERY

## 2018-01-16 PROCEDURE — 160026 HCHG SURGERY MINUTES - 1ST 30 MINS LEVEL 1: Performed by: ORTHOPAEDIC SURGERY

## 2018-01-16 PROCEDURE — 160025 RECOVERY II MINUTES (STATS): Performed by: ORTHOPAEDIC SURGERY

## 2018-01-16 PROCEDURE — 160048 HCHG OR STATISTICAL LEVEL 1-5: Performed by: ORTHOPAEDIC SURGERY

## 2018-01-16 PROCEDURE — 160035 HCHG PACU - 1ST 60 MINS PHASE I: Performed by: ORTHOPAEDIC SURGERY

## 2018-01-16 PROCEDURE — 700111 HCHG RX REV CODE 636 W/ 250 OVERRIDE (IP)

## 2018-01-16 PROCEDURE — 160046 HCHG PACU - 1ST 60 MINS PHASE II: Performed by: ORTHOPAEDIC SURGERY

## 2018-01-16 PROCEDURE — 93005 ELECTROCARDIOGRAM TRACING: CPT | Performed by: ORTHOPAEDIC SURGERY

## 2018-01-16 PROCEDURE — 93010 ELECTROCARDIOGRAM REPORT: CPT | Performed by: INTERNAL MEDICINE

## 2018-01-16 PROCEDURE — 500865 HCHG NEEDLE, SPINAL 20G/22G: Performed by: ORTHOPAEDIC SURGERY

## 2018-01-16 PROCEDURE — 700101 HCHG RX REV CODE 250

## 2018-01-16 PROCEDURE — 160009 HCHG ANES TIME/MIN: Performed by: ORTHOPAEDIC SURGERY

## 2018-01-16 RX ORDER — LIDOCAINE HYDROCHLORIDE 10 MG/ML
INJECTION, SOLUTION INFILTRATION; PERINEURAL
Status: COMPLETED
Start: 2018-01-16 | End: 2018-01-16

## 2018-01-16 RX ORDER — MIDAZOLAM HYDROCHLORIDE 1 MG/ML
INJECTION INTRAMUSCULAR; INTRAVENOUS
Status: DISCONTINUED
Start: 2018-01-16 | End: 2018-01-16 | Stop reason: HOSPADM

## 2018-01-16 RX ORDER — SODIUM CHLORIDE, SODIUM LACTATE, POTASSIUM CHLORIDE, CALCIUM CHLORIDE 600; 310; 30; 20 MG/100ML; MG/100ML; MG/100ML; MG/100ML
1000 INJECTION, SOLUTION INTRAVENOUS
Status: COMPLETED | OUTPATIENT
Start: 2018-01-16 | End: 2018-01-16

## 2018-01-16 RX ORDER — POTASSIUM CITRATE 10 MEQ/1
20 TABLET, EXTENDED RELEASE ORAL DAILY
COMMUNITY
End: 2018-05-03

## 2018-01-16 RX ORDER — LIDOCAINE AND PRILOCAINE 25; 25 MG/G; MG/G
1 CREAM TOPICAL
Status: DISCONTINUED | OUTPATIENT
Start: 2018-01-16 | End: 2018-01-16 | Stop reason: HOSPADM

## 2018-01-16 RX ORDER — LIDOCAINE HYDROCHLORIDE 10 MG/ML
0.5 INJECTION, SOLUTION INFILTRATION; PERINEURAL
Status: DISCONTINUED | OUTPATIENT
Start: 2018-01-16 | End: 2018-01-16 | Stop reason: HOSPADM

## 2018-01-16 RX ADMIN — SODIUM CHLORIDE, SODIUM LACTATE, POTASSIUM CHLORIDE, CALCIUM CHLORIDE 1000 ML: 600; 310; 30; 20 INJECTION, SOLUTION INTRAVENOUS at 08:00

## 2018-01-16 RX ADMIN — LIDOCAINE HYDROCHLORIDE: 10 INJECTION, SOLUTION INFILTRATION; PERINEURAL at 08:00

## 2018-01-16 ASSESSMENT — PAIN SCALES - GENERAL
PAINLEVEL_OUTOF10: 0

## 2018-01-16 NOTE — OP REPORT
DATE OF SERVICE:  01/16/2018    PREOPERATIVE DIAGNOSES:  1.  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  2.  Late dislocation, right hip.  3.  History of right total hip arthroplasty.    POSTOPERATIVE DIAGNOSES:  1.  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  2.  Late dislocation, right hip.  3.  History of right total hip arthroplasty.    PROCEDURES:   Arthrocentesis, right hip.  Right total hip arthroplasty.    SURGEON:  Dion Dennis MD    ASSISTANT:  None.    ANESTHESIOLOGIST:  Geoff Rodney MD    ANESTHESIA:  Conscious sedation.    ESTIMATED BLOOD LOSS:  Zero.    INDICATIONS:  The patient is a 77-year-old.  She had a right total hip   arthroplasty several years ago and was functioning well until last year when   it was dislocation.  She had a closed reduction.  She has not had anymore   dislocation episodes; however, she did have some abnormality noted under x-ray   and given the modular neck, I had ordered serum metal ions.  She did not get   this done for a few months and then got it done over the summer, but was not   sent to my office. So, I then saw her for followup last month and the   abnormalities on the x-ray had progressed.  The serum metal ions were elevated   as well.  We repeated these and they are still elevated.  The sed rate and   CRP are normal.  Anticipate that she needs revision for metalosis.  However,   we are completing workup for infection.  I have recommended arthrocentesis.    DESCRIPTION OF THE PROCEDURE:  Patient was identified in the preoperative   holding area.  Her right leg was marked.  She was taken to the operating room   where she was placed supine on the operating table.  Conscious sedation was   administered.  We then prepped the anterior aspect of the right hip.  Time-out   was held to confirm the patient's identity and correct surgical site.    An 18-gauge spinal needle was inserted into the hip joint.  I aspirated 11 mL   of slightly reddish  brown fluid.  This was placed into the lab tubes and   delivered to the [facility] lab.  A Band-Aid was applied.  The patient was   awakened, transferred to the recovery room in stable condition.    POSTOPERATIVE PLAN:  1.  Follow up on labs.  2.  Weightbearing as tolerated.  3.  Probable revision arthroplasty in the next few weeks.       ____________________________________     MD LAVELL RODARTE / RUTH    DD:  01/16/2018 12:14:45  DT:  01/16/2018 13:09:54    D#:  [id number]  Job#:  [id number]

## 2018-01-16 NOTE — DISCHARGE INSTRUCTIONS
ACTIVITY: Rest and take it easy for the first 24 hours.  A responsible adult is recommended to remain with you during that time.  It is normal to feel sleepy.  We encourage you to not do anything that requires balance, judgment or coordination.    MILD FLU-LIKE SYMPTOMS ARE NORMAL. YOU MAY EXPERIENCE GENERALIZED MUSCLE ACHES, THROAT IRRITATION, HEADACHE AND/OR SOME NAUSEA.    FOR 24 HOURS DO NOT:  Drive, operate machinery or run household appliances.  Drink beer or alcoholic beverages.   Make important decisions or sign legal documents.    SPECIAL INSTRUCTIONS: *Weight bearing as tolerated to right leg*    DIET: To avoid nausea, slowly advance diet as tolerated, avoiding spicy or greasy foods for the first day.  Add more substantial food to your diet according to your physician's instructions.  Babies can be fed formula or breast milk as soon as they are hungry.  INCREASE FLUIDS AND FIBER TO AVOID CONSTIPATION.    SURGICAL DRESSING/BATHING: *May shower as desired*    FOLLOW-UP APPOINTMENT:  A follow-up appointment should be arranged with your doctor; call to schedule.    You should CALL YOUR PHYSICIAN if you develop:  Fever greater than 101 degrees F.  Pain not relieved by medication, or persistent nausea or vomiting.  Excessive bleeding (blood soaking through dressing) or unexpected drainage from the wound.  Extreme redness or swelling around the incision site, drainage of pus or foul smelling drainage.  Inability to urinate or empty your bladder within 8 hours.  Problems with breathing or chest pain.    You should call 911 if you develop problems with breathing or chest pain.  If you are unable to contact your doctor or surgical center, you should go to the nearest emergency room or urgent care center.  Physician's telephone #: *Dr. Dennis [phone]*    If any questions arise, call your doctor.  If your doctor is not available, please feel free to call the Surgical Center at [phone].  The Center is open  Monday through Friday from 7AM to 7PM.  You can also call the HEALTH HOTLINE open 24 hours/day, 7 days/week and speak to a nurse at [phone], or toll free at [phone].    A registered nurse may call you a few days after your surgery to see how you are doing after your procedure.    MEDICATIONS: Resume taking daily medication.  Take prescribed pain medication with food.  If no medication is prescribed, you may take non-aspirin pain medication if needed.  PAIN MEDICATION CAN BE VERY CONSTIPATING.  Take a stool softener or laxative such as senokot, pericolace, or milk of magnesia if needed.    If your physician has prescribed pain medication that includes Acetaminophen (Tylenol), do not take additional Acetaminophen (Tylenol) while taking the prescribed medication.    Depression / Suicide Risk    As you are discharged from this [facility] facility, it is important to learn how to keep safe from harming yourself.    Recognize the warning signs:  · Abrupt changes in personality, positive or negative- including increase in energy   · Giving away possessions  · Change in eating patterns- significant weight changes-  positive or negative  · Change in sleeping patterns- unable to sleep or sleeping all the time   · Unwillingness or inability to communicate  · Depression  · Unusual sadness, discouragement and loneliness  · Talk of wanting to die  · Neglect of personal appearance   · Rebelliousness- reckless behavior  · Withdrawal from people/activities they love  · Confusion- inability to concentrate     If you or a loved one observes any of these behaviors or has concerns about self-harm, here's what you can do:  · Talk about it- your feelings and reasons for harming yourself  · Remove any means that you might use to hurt yourself (examples: pills, rope, extension cords, firearm)  · Get professional help from the community (Mental Health, Substance Abuse, psychological counseling)  · Do not be alone:Call your  Safe Contact- someone whom you trust who will be there for you.  · Call your local CRISIS HOTLINE 838-7617 or 521-202-0836  · Call your local Children's Mobile Crisis Response Team [place] (401) 050-2011 or www.[employer]  · Call the toll free National Suicide Prevention Hotlines   · National Suicide Prevention Lifeline 319-376-XZNC (6776)  · National [employer] Line Network 800-SUICIDE (146-8737)

## 2018-01-16 NOTE — OR NURSING
Pt up ambulating to bathroom, steady gait.  No complaints of pain.  Able to void without difficulty.

## 2018-02-12 ENCOUNTER — APPOINTMENT (OUTPATIENT)
Dept: ADMISSIONS | Facility: MEDICAL CENTER | Age: 78
DRG: 468 | End: 2018-02-12
Payer: MEDICARE

## 2018-02-14 DIAGNOSIS — Z01.812 PRE-PROCEDURAL LABORATORY EXAMINATION: ICD-10-CM

## 2018-02-14 LAB
ANION GAP SERPL CALC-SCNC: 7 MMOL/L (ref 0–11.9)
APPEARANCE UR: CLEAR
BACTERIA #/AREA URNS HPF: ABNORMAL /HPF
BASOPHILS # BLD AUTO: 0.9 % (ref 0–1.8)
BASOPHILS # BLD: 0.06 K/UL (ref 0–0.12)
BILIRUB UR QL STRIP.AUTO: NEGATIVE
BUN SERPL-MCNC: 19 MG/DL (ref 8–22)
CALCIUM SERPL-MCNC: 9.6 MG/DL (ref 8.5–10.5)
CHLORIDE SERPL-SCNC: 106 MMOL/L (ref 96–112)
CO2 SERPL-SCNC: 30 MMOL/L (ref 20–33)
COLOR UR: YELLOW
CREAT SERPL-MCNC: 0.93 MG/DL (ref 0.5–1.4)
CULTURE IF INDICATED INDCX: YES UA CULTURE
EOSINOPHIL # BLD AUTO: 0.19 K/UL (ref 0–0.51)
EOSINOPHIL NFR BLD: 3 % (ref 0–6.9)
EPI CELLS #/AREA URNS HPF: NEGATIVE /HPF
ERYTHROCYTE [DISTWIDTH] IN BLOOD BY AUTOMATED COUNT: 42.2 FL (ref 35.9–50)
GLUCOSE SERPL-MCNC: 99 MG/DL (ref 65–99)
GLUCOSE UR STRIP.AUTO-MCNC: NEGATIVE MG/DL
HCT VFR BLD AUTO: 42 % (ref 37–47)
HGB BLD-MCNC: 13.9 G/DL (ref 12–16)
HYALINE CASTS #/AREA URNS LPF: ABNORMAL /LPF
IMM GRANULOCYTES # BLD AUTO: 0.02 K/UL (ref 0–0.11)
IMM GRANULOCYTES NFR BLD AUTO: 0.3 % (ref 0–0.9)
KETONES UR STRIP.AUTO-MCNC: NEGATIVE MG/DL
LEUKOCYTE ESTERASE UR QL STRIP.AUTO: ABNORMAL
LYMPHOCYTES # BLD AUTO: 1.64 K/UL (ref 1–4.8)
LYMPHOCYTES NFR BLD: 25.8 % (ref 22–41)
MCH RBC QN AUTO: 29.8 PG (ref 27–33)
MCHC RBC AUTO-ENTMCNC: 33.1 G/DL (ref 33.6–35)
MCV RBC AUTO: 90.1 FL (ref 81.4–97.8)
MICRO URNS: ABNORMAL
MONOCYTES # BLD AUTO: 0.52 K/UL (ref 0–0.85)
MONOCYTES NFR BLD AUTO: 8.2 % (ref 0–13.4)
NEUTROPHILS # BLD AUTO: 3.93 K/UL (ref 2–7.15)
NEUTROPHILS NFR BLD: 61.8 % (ref 44–72)
NITRITE UR QL STRIP.AUTO: NEGATIVE
NRBC # BLD AUTO: 0 K/UL
NRBC BLD-RTO: 0 /100 WBC
PH UR STRIP.AUTO: 6.5 [PH]
PLATELET # BLD AUTO: 296 K/UL (ref 164–446)
PMV BLD AUTO: 9.5 FL (ref 9–12.9)
POTASSIUM SERPL-SCNC: 3.9 MMOL/L (ref 3.6–5.5)
PROT UR QL STRIP: NEGATIVE MG/DL
RBC # BLD AUTO: 4.66 M/UL (ref 4.2–5.4)
RBC # URNS HPF: ABNORMAL /HPF
RBC UR QL AUTO: NEGATIVE
SCCMEC + MECA PNL NOSE NAA+PROBE: NEGATIVE
SCCMEC + MECA PNL NOSE NAA+PROBE: NEGATIVE
SODIUM SERPL-SCNC: 143 MMOL/L (ref 135–145)
SP GR UR STRIP.AUTO: 1.02
UROBILINOGEN UR STRIP.AUTO-MCNC: 1 MG/DL
WBC # BLD AUTO: 6.4 K/UL (ref 4.8–10.8)
WBC #/AREA URNS HPF: ABNORMAL /HPF

## 2018-02-14 PROCEDURE — 80048 BASIC METABOLIC PNL TOTAL CA: CPT

## 2018-02-14 PROCEDURE — 87186 SC STD MICRODIL/AGAR DIL: CPT

## 2018-02-14 PROCEDURE — 81001 URINALYSIS AUTO W/SCOPE: CPT

## 2018-02-14 PROCEDURE — 87640 STAPH A DNA AMP PROBE: CPT

## 2018-02-14 PROCEDURE — 87086 URINE CULTURE/COLONY COUNT: CPT

## 2018-02-14 PROCEDURE — 36415 COLL VENOUS BLD VENIPUNCTURE: CPT

## 2018-02-14 PROCEDURE — 85025 COMPLETE CBC W/AUTO DIFF WBC: CPT

## 2018-02-14 PROCEDURE — 87641 MR-STAPH DNA AMP PROBE: CPT

## 2018-02-14 PROCEDURE — 87077 CULTURE AEROBIC IDENTIFY: CPT

## 2018-02-15 NOTE — DISCHARGE PLANNING
DISCHARGE PLANNING NOTE - TOTAL JOINT     Procedure: Procedure(s):  HIP REVISION TOTAL  Procedure Date: 2/20/2018  Insurance:  Payor: [employer]US / Plan: [employer]   Equipment currently available at home? cane, front-wheel walker, raised toilet seat and shower chair  Steps into the home? 2 steps but has a ramp as well  Steps within the home? 0  Toilet height? Standard  Type of shower? walk-in shower with grab bar  Who will be with you during your recovery? son, daughter  Is Outpatient Physical Therapy set up after surgery? No   Did you take the Total Joint Class and where? No, hip replacement 2011.     Plan: There are no identified discharge needs. Anticipate discharge home.

## 2018-02-16 LAB
BACTERIA UR CULT: ABNORMAL
BACTERIA UR CULT: ABNORMAL
SIGNIFICANT IND 70042: ABNORMAL
SITE SITE: ABNORMAL
SOURCE SOURCE: ABNORMAL

## 2018-02-16 RX ORDER — CEFAZOLIN SODIUM 2 G/100ML
2 INJECTION, SOLUTION INTRAVENOUS
Status: DISCONTINUED | OUTPATIENT
Start: 2018-02-20 | End: 2018-02-20

## 2018-02-20 ENCOUNTER — APPOINTMENT (OUTPATIENT)
Dept: RADIOLOGY | Facility: MEDICAL CENTER | Age: 78
DRG: 468 | End: 2018-02-20
Attending: ORTHOPAEDIC SURGERY
Payer: MEDICARE

## 2018-02-20 ENCOUNTER — HOSPITAL ENCOUNTER (INPATIENT)
Facility: MEDICAL CENTER | Age: 78
LOS: 1 days | DRG: 468 | End: 2018-02-21
Attending: ORTHOPAEDIC SURGERY | Admitting: ORTHOPAEDIC SURGERY
Payer: MEDICARE

## 2018-02-20 DIAGNOSIS — T84.098A: ICD-10-CM

## 2018-02-20 DIAGNOSIS — Z96.649: ICD-10-CM

## 2018-02-20 LAB
GRAM STN SPEC: NORMAL
SIGNIFICANT IND 70042: NORMAL
SITE SITE: NORMAL
SOURCE SOURCE: NORMAL

## 2018-02-20 PROCEDURE — 3E0U029 INTRODUCTION OF OTHER ANTI-INFECTIVE INTO JOINTS, OPEN APPROACH: ICD-10-PCS | Performed by: ORTHOPAEDIC SURGERY

## 2018-02-20 PROCEDURE — A6402 STERILE GAUZE <= 16 SQ IN: HCPCS | Performed by: ORTHOPAEDIC SURGERY

## 2018-02-20 PROCEDURE — 110371 HCHG SHELL REV 272: Performed by: ORTHOPAEDIC SURGERY

## 2018-02-20 PROCEDURE — 0SR902Z REPLACEMENT OF RIGHT HIP JOINT WITH METAL ON POLYETHYLENE SYNTHETIC SUBSTITUTE, OPEN APPROACH: ICD-10-PCS | Performed by: ORTHOPAEDIC SURGERY

## 2018-02-20 PROCEDURE — 0QB60ZZ EXCISION OF RIGHT UPPER FEMUR, OPEN APPROACH: ICD-10-PCS | Performed by: ORTHOPAEDIC SURGERY

## 2018-02-20 PROCEDURE — 502578 HCHG PACK, TOTAL HIP: Performed by: ORTHOPAEDIC SURGERY

## 2018-02-20 PROCEDURE — 160002 HCHG RECOVERY MINUTES (STAT): Performed by: ORTHOPAEDIC SURGERY

## 2018-02-20 PROCEDURE — 160031 HCHG SURGERY MINUTES - 1ST 30 MINS LEVEL 5: Performed by: ORTHOPAEDIC SURGERY

## 2018-02-20 PROCEDURE — A9270 NON-COVERED ITEM OR SERVICE: HCPCS | Performed by: ORTHOPAEDIC SURGERY

## 2018-02-20 PROCEDURE — 160048 HCHG OR STATISTICAL LEVEL 1-5: Performed by: ORTHOPAEDIC SURGERY

## 2018-02-20 PROCEDURE — 160036 HCHG PACU - EA ADDL 30 MINS PHASE I: Performed by: ORTHOPAEDIC SURGERY

## 2018-02-20 PROCEDURE — 88305 TISSUE EXAM BY PATHOLOGIST: CPT

## 2018-02-20 PROCEDURE — 700101 HCHG RX REV CODE 250

## 2018-02-20 PROCEDURE — 160009 HCHG ANES TIME/MIN: Performed by: ORTHOPAEDIC SURGERY

## 2018-02-20 PROCEDURE — 700102 HCHG RX REV CODE 250 W/ 637 OVERRIDE(OP): Performed by: ORTHOPAEDIC SURGERY

## 2018-02-20 PROCEDURE — 770006 HCHG ROOM/CARE - MED/SURG/GYN SEMI*

## 2018-02-20 PROCEDURE — 700101 HCHG RX REV CODE 250: Performed by: ORTHOPAEDIC SURGERY

## 2018-02-20 PROCEDURE — 87070 CULTURE OTHR SPECIMN AEROBIC: CPT

## 2018-02-20 PROCEDURE — 72170 X-RAY EXAM OF PELVIS: CPT

## 2018-02-20 PROCEDURE — 501838 HCHG SUTURE GENERAL: Performed by: ORTHOPAEDIC SURGERY

## 2018-02-20 PROCEDURE — 0SP90JZ REMOVAL OF SYNTHETIC SUBSTITUTE FROM RIGHT HIP JOINT, OPEN APPROACH: ICD-10-PCS | Performed by: ORTHOPAEDIC SURGERY

## 2018-02-20 PROCEDURE — 0KQN0ZZ REPAIR RIGHT HIP MUSCLE, OPEN APPROACH: ICD-10-PCS | Performed by: ORTHOPAEDIC SURGERY

## 2018-02-20 PROCEDURE — 87075 CULTR BACTERIA EXCEPT BLOOD: CPT

## 2018-02-20 PROCEDURE — 700111 HCHG RX REV CODE 636 W/ 250 OVERRIDE (IP): Performed by: ORTHOPAEDIC SURGERY

## 2018-02-20 PROCEDURE — 51798 US URINE CAPACITY MEASURE: CPT

## 2018-02-20 PROCEDURE — A9270 NON-COVERED ITEM OR SERVICE: HCPCS

## 2018-02-20 PROCEDURE — 87015 SPECIMEN INFECT AGNT CONCNTJ: CPT

## 2018-02-20 PROCEDURE — 500864 HCHG NEEDLE, SPINAL 18G: Performed by: ORTHOPAEDIC SURGERY

## 2018-02-20 PROCEDURE — 700111 HCHG RX REV CODE 636 W/ 250 OVERRIDE (IP)

## 2018-02-20 PROCEDURE — 700102 HCHG RX REV CODE 250 W/ 637 OVERRIDE(OP)

## 2018-02-20 PROCEDURE — 700105 HCHG RX REV CODE 258: Performed by: ORTHOPAEDIC SURGERY

## 2018-02-20 PROCEDURE — 87205 SMEAR GRAM STAIN: CPT

## 2018-02-20 PROCEDURE — A4314 CATH W/DRAINAGE 2-WAY LATEX: HCPCS | Performed by: ORTHOPAEDIC SURGERY

## 2018-02-20 PROCEDURE — 502000 HCHG MISC OR IMPLANTS RC 0278: Performed by: ORTHOPAEDIC SURGERY

## 2018-02-20 PROCEDURE — 700112 HCHG RX REV CODE 229: Performed by: ORTHOPAEDIC SURGERY

## 2018-02-20 PROCEDURE — 0SB90ZX EXCISION OF RIGHT HIP JOINT, OPEN APPROACH, DIAGNOSTIC: ICD-10-PCS | Performed by: ORTHOPAEDIC SURGERY

## 2018-02-20 PROCEDURE — 160042 HCHG SURGERY MINUTES - EA ADDL 1 MIN LEVEL 5: Performed by: ORTHOPAEDIC SURGERY

## 2018-02-20 PROCEDURE — 160035 HCHG PACU - 1ST 60 MINS PHASE I: Performed by: ORTHOPAEDIC SURGERY

## 2018-02-20 PROCEDURE — 501480 HCHG STOCKINETTE: Performed by: ORTHOPAEDIC SURGERY

## 2018-02-20 DEVICE — IMPLANTABLE DEVICE: Type: IMPLANTABLE DEVICE | Status: FUNCTIONAL

## 2018-02-20 DEVICE — BEADS STIMULAN CALCIUM SULFATE: Type: IMPLANTABLE DEVICE | Status: FUNCTIONAL

## 2018-02-20 RX ORDER — CIPROFLOXACIN 250 MG/1
250 TABLET, FILM COATED ORAL 2 TIMES DAILY
Status: ON HOLD | COMMUNITY
End: 2018-02-21

## 2018-02-20 RX ORDER — GABAPENTIN 300 MG/1
CAPSULE ORAL
Status: COMPLETED
Start: 2018-02-20 | End: 2018-02-20

## 2018-02-20 RX ORDER — DEXTROSE MONOHYDRATE, SODIUM CHLORIDE, AND POTASSIUM CHLORIDE 50; 1.49; 4.5 G/1000ML; G/1000ML; G/1000ML
INJECTION, SOLUTION INTRAVENOUS CONTINUOUS
Status: DISCONTINUED | OUTPATIENT
Start: 2018-02-20 | End: 2018-02-21 | Stop reason: HOSPADM

## 2018-02-20 RX ORDER — AMOXICILLIN 250 MG
1 CAPSULE ORAL NIGHTLY
Status: DISCONTINUED | OUTPATIENT
Start: 2018-02-20 | End: 2018-02-21 | Stop reason: HOSPADM

## 2018-02-20 RX ORDER — POLYETHYLENE GLYCOL 3350 17 G/17G
1 POWDER, FOR SOLUTION ORAL 2 TIMES DAILY PRN
Status: DISCONTINUED | OUTPATIENT
Start: 2018-02-20 | End: 2018-02-21 | Stop reason: HOSPADM

## 2018-02-20 RX ORDER — LIDOCAINE AND PRILOCAINE 25; 25 MG/G; MG/G
1 CREAM TOPICAL
Status: DISCONTINUED | OUTPATIENT
Start: 2018-02-20 | End: 2018-02-20

## 2018-02-20 RX ORDER — TOBRAMYCIN 1.2 G/30ML
INJECTION, POWDER, LYOPHILIZED, FOR SOLUTION INTRAVENOUS
Status: DISCONTINUED | OUTPATIENT
Start: 2018-02-20 | End: 2018-02-20 | Stop reason: HOSPADM

## 2018-02-20 RX ORDER — AMLODIPINE BESYLATE 5 MG/1
2.5 TABLET ORAL DAILY
Status: DISCONTINUED | OUTPATIENT
Start: 2018-02-21 | End: 2018-02-21 | Stop reason: HOSPADM

## 2018-02-20 RX ORDER — OXYCODONE HYDROCHLORIDE 10 MG/1
10 TABLET ORAL
Status: DISCONTINUED | OUTPATIENT
Start: 2018-02-20 | End: 2018-02-21 | Stop reason: HOSPADM

## 2018-02-20 RX ORDER — ACETAMINOPHEN 500 MG
TABLET ORAL
Status: COMPLETED
Start: 2018-02-20 | End: 2018-02-20

## 2018-02-20 RX ORDER — CEFAZOLIN SODIUM 2 G/100ML
2 INJECTION, SOLUTION INTRAVENOUS EVERY 8 HOURS
Status: COMPLETED | OUTPATIENT
Start: 2018-02-20 | End: 2018-02-20

## 2018-02-20 RX ORDER — ACETAMINOPHEN 325 MG/1
650 TABLET ORAL EVERY 6 HOURS
Status: DISCONTINUED | OUTPATIENT
Start: 2018-02-20 | End: 2018-02-21 | Stop reason: HOSPADM

## 2018-02-20 RX ORDER — ENEMA 19; 7 G/133ML; G/133ML
1 ENEMA RECTAL
Status: DISCONTINUED | OUTPATIENT
Start: 2018-02-20 | End: 2018-02-21 | Stop reason: HOSPADM

## 2018-02-20 RX ORDER — LIDOCAINE HYDROCHLORIDE 10 MG/ML
INJECTION, SOLUTION INFILTRATION; PERINEURAL
Status: COMPLETED
Start: 2018-02-20 | End: 2018-02-20

## 2018-02-20 RX ORDER — OXYCODONE HYDROCHLORIDE 5 MG/1
5 TABLET ORAL
Status: DISCONTINUED | OUTPATIENT
Start: 2018-02-20 | End: 2018-02-21 | Stop reason: HOSPADM

## 2018-02-20 RX ORDER — LOVASTATIN 20 MG/1
80 TABLET ORAL EVERY EVENING
Status: DISCONTINUED | OUTPATIENT
Start: 2018-02-20 | End: 2018-02-21 | Stop reason: HOSPADM

## 2018-02-20 RX ORDER — CELECOXIB 200 MG/1
CAPSULE ORAL
Status: COMPLETED
Start: 2018-02-20 | End: 2018-02-20

## 2018-02-20 RX ORDER — DOCUSATE SODIUM 100 MG/1
100 CAPSULE, LIQUID FILLED ORAL 2 TIMES DAILY
Status: DISCONTINUED | OUTPATIENT
Start: 2018-02-20 | End: 2018-02-21 | Stop reason: HOSPADM

## 2018-02-20 RX ORDER — PANTOPRAZOLE SODIUM 40 MG/1
40 TABLET, DELAYED RELEASE ORAL DAILY
Status: DISCONTINUED | OUTPATIENT
Start: 2018-02-20 | End: 2018-02-20

## 2018-02-20 RX ORDER — CIPROFLOXACIN 500 MG/1
250 TABLET, FILM COATED ORAL 2 TIMES DAILY
Status: DISCONTINUED | OUTPATIENT
Start: 2018-02-20 | End: 2018-02-20

## 2018-02-20 RX ORDER — POTASSIUM CITRATE 10 MEQ/1
20 TABLET, EXTENDED RELEASE ORAL DAILY
Status: DISCONTINUED | OUTPATIENT
Start: 2018-02-20 | End: 2018-02-21 | Stop reason: HOSPADM

## 2018-02-20 RX ORDER — LOSARTAN POTASSIUM 50 MG/1
100 TABLET ORAL DAILY
Status: DISCONTINUED | OUTPATIENT
Start: 2018-02-20 | End: 2018-02-21 | Stop reason: HOSPADM

## 2018-02-20 RX ORDER — AMOXICILLIN 250 MG
1 CAPSULE ORAL
Status: DISCONTINUED | OUTPATIENT
Start: 2018-02-20 | End: 2018-02-21 | Stop reason: HOSPADM

## 2018-02-20 RX ORDER — OMEPRAZOLE 20 MG/1
20 CAPSULE, DELAYED RELEASE ORAL DAILY
Status: DISCONTINUED | OUTPATIENT
Start: 2018-02-20 | End: 2018-02-21 | Stop reason: HOSPADM

## 2018-02-20 RX ORDER — LIDOCAINE HYDROCHLORIDE 10 MG/ML
0.5 INJECTION, SOLUTION INFILTRATION; PERINEURAL
Status: DISCONTINUED | OUTPATIENT
Start: 2018-02-20 | End: 2018-02-20

## 2018-02-20 RX ORDER — ONDANSETRON 2 MG/ML
4 INJECTION INTRAMUSCULAR; INTRAVENOUS EVERY 4 HOURS PRN
Status: DISCONTINUED | OUTPATIENT
Start: 2018-02-20 | End: 2018-02-21 | Stop reason: HOSPADM

## 2018-02-20 RX ORDER — SCOLOPAMINE TRANSDERMAL SYSTEM 1 MG/1
PATCH, EXTENDED RELEASE TRANSDERMAL
Status: COMPLETED
Start: 2018-02-20 | End: 2018-02-20

## 2018-02-20 RX ORDER — SODIUM CHLORIDE, SODIUM LACTATE, POTASSIUM CHLORIDE, CALCIUM CHLORIDE 600; 310; 30; 20 MG/100ML; MG/100ML; MG/100ML; MG/100ML
INJECTION, SOLUTION INTRAVENOUS CONTINUOUS
Status: DISCONTINUED | OUTPATIENT
Start: 2018-02-20 | End: 2018-02-20

## 2018-02-20 RX ORDER — BISACODYL 10 MG
10 SUPPOSITORY, RECTAL RECTAL
Status: DISCONTINUED | OUTPATIENT
Start: 2018-02-20 | End: 2018-02-21 | Stop reason: HOSPADM

## 2018-02-20 RX ADMIN — LIDOCAINE HYDROCHLORIDE 0.5 ML: 10 INJECTION, SOLUTION INFILTRATION; PERINEURAL at 07:00

## 2018-02-20 RX ADMIN — VITAMIN D, TAB 1000IU (100/BT) 1000 UNITS: 25 TAB at 13:32

## 2018-02-20 RX ADMIN — CEFAZOLIN SODIUM 2 G: 2 INJECTION, SOLUTION INTRAVENOUS at 18:19

## 2018-02-20 RX ADMIN — CEFAZOLIN SODIUM 2 G: 2 INJECTION, SOLUTION INTRAVENOUS at 11:30

## 2018-02-20 RX ADMIN — POTASSIUM CITRATE 20 MEQ: 10 TABLET, EXTENDED RELEASE ORAL at 15:36

## 2018-02-20 RX ADMIN — LOVASTATIN 80 MG: 20 TABLET ORAL at 20:40

## 2018-02-20 RX ADMIN — GABAPENTIN 600 MG: 300 CAPSULE ORAL at 06:54

## 2018-02-20 RX ADMIN — SCOPALAMINE 1 PATCH: 1 PATCH, EXTENDED RELEASE TRANSDERMAL at 06:45

## 2018-02-20 RX ADMIN — OMEPRAZOLE 20 MG: 20 CAPSULE, DELAYED RELEASE ORAL at 13:33

## 2018-02-20 RX ADMIN — SODIUM CHLORIDE, SODIUM LACTATE, POTASSIUM CHLORIDE, CALCIUM CHLORIDE: 600; 310; 30; 20 INJECTION, SOLUTION INTRAVENOUS at 07:00

## 2018-02-20 RX ADMIN — POTASSIUM CHLORIDE, DEXTROSE MONOHYDRATE AND SODIUM CHLORIDE: 150; 5; 450 INJECTION, SOLUTION INTRAVENOUS at 23:10

## 2018-02-20 RX ADMIN — ACETAMINOPHEN 650 MG: 325 TABLET, FILM COATED ORAL at 13:32

## 2018-02-20 RX ADMIN — ACETAMINOPHEN 500 MG: 500 TABLET, FILM COATED ORAL at 06:52

## 2018-02-20 RX ADMIN — ACETAMINOPHEN 650 MG: 325 TABLET, FILM COATED ORAL at 18:19

## 2018-02-20 RX ADMIN — CELECOXIB 200 MG: 200 CAPSULE ORAL at 06:54

## 2018-02-20 RX ADMIN — DOCUSATE SODIUM 100 MG: 100 CAPSULE ORAL at 20:40

## 2018-02-20 RX ADMIN — STANDARDIZED SENNA CONCENTRATE AND DOCUSATE SODIUM 1 TABLET: 8.6; 5 TABLET, FILM COATED ORAL at 20:40

## 2018-02-20 RX ADMIN — POTASSIUM CHLORIDE, DEXTROSE MONOHYDRATE AND SODIUM CHLORIDE: 150; 5; 450 INJECTION, SOLUTION INTRAVENOUS at 13:33

## 2018-02-20 ASSESSMENT — LIFESTYLE VARIABLES
EVER_SMOKED: NEVER
DO YOU DRINK ALCOHOL: NO

## 2018-02-20 ASSESSMENT — PAIN SCALES - GENERAL
PAINLEVEL_OUTOF10: 0

## 2018-02-20 ASSESSMENT — PATIENT HEALTH QUESTIONNAIRE - PHQ9
1. LITTLE INTEREST OR PLEASURE IN DOING THINGS: NOT AT ALL
1. LITTLE INTEREST OR PLEASURE IN DOING THINGS: NOT AT ALL
2. FEELING DOWN, DEPRESSED, IRRITABLE, OR HOPELESS: NOT AT ALL
SUM OF ALL RESPONSES TO PHQ9 QUESTIONS 1 AND 2: 0
SUM OF ALL RESPONSES TO PHQ QUESTIONS 1-9: 0
2. FEELING DOWN, DEPRESSED, IRRITABLE, OR HOPELESS: NOT AT ALL
SUM OF ALL RESPONSES TO PHQ9 QUESTIONS 1 AND 2: 0
SUM OF ALL RESPONSES TO PHQ QUESTIONS 1-9: 0

## 2018-02-20 NOTE — OR SURGEON
Immediate Post OP Note    PreOp Diagnosis: Failed right PHOENIX    PostOp Diagnosis: Same    Procedure(s):  HIP REVISION TOTAL - Wound Class: Clean    Surgeon(s):  Dion Dennis M.D.    Anesthesiologist/Type of Anesthesia:  Anesthesiologist: Wong Rizvi M.D./General    Surgical Staff:  Circulator: Teresa Zepeda R.N.  Relief Circulator: Glory Johnson R.N.  Scrub Person: Sanjana Martinez; Gertrude Castillo    Specimens:  * No specimens in log *    Estimated Blood Loss: 500 cc    Findings: Metallosis, pseudotumor    Complications: None        2/20/2018 10:17 AM Dion Dennis

## 2018-02-21 VITALS
HEART RATE: 65 BPM | OXYGEN SATURATION: 93 % | TEMPERATURE: 97.5 F | BODY MASS INDEX: 31.21 KG/M2 | HEIGHT: 68 IN | SYSTOLIC BLOOD PRESSURE: 116 MMHG | DIASTOLIC BLOOD PRESSURE: 64 MMHG | RESPIRATION RATE: 16 BRPM | WEIGHT: 205.91 LBS

## 2018-02-21 PROBLEM — T84.098A: Status: ACTIVE | Noted: 2018-02-21

## 2018-02-21 PROBLEM — Z96.649: Status: ACTIVE | Noted: 2018-02-21

## 2018-02-21 LAB
ANION GAP SERPL CALC-SCNC: 3 MMOL/L (ref 0–11.9)
BUN SERPL-MCNC: 19 MG/DL (ref 8–22)
CALCIUM SERPL-MCNC: 9.4 MG/DL (ref 8.5–10.5)
CHLORIDE SERPL-SCNC: 109 MMOL/L (ref 96–112)
CO2 SERPL-SCNC: 27 MMOL/L (ref 20–33)
CREAT SERPL-MCNC: 0.75 MG/DL (ref 0.5–1.4)
ERYTHROCYTE [DISTWIDTH] IN BLOOD BY AUTOMATED COUNT: 42 FL (ref 35.9–50)
GLUCOSE SERPL-MCNC: 141 MG/DL (ref 65–99)
HCT VFR BLD AUTO: 32.7 % (ref 37–47)
HGB BLD-MCNC: 10.7 G/DL (ref 12–16)
MCH RBC QN AUTO: 29.4 PG (ref 27–33)
MCHC RBC AUTO-ENTMCNC: 32.7 G/DL (ref 33.6–35)
MCV RBC AUTO: 89.8 FL (ref 81.4–97.8)
PLATELET # BLD AUTO: 209 K/UL (ref 164–446)
PMV BLD AUTO: 9.4 FL (ref 9–12.9)
POTASSIUM SERPL-SCNC: 4.1 MMOL/L (ref 3.6–5.5)
RBC # BLD AUTO: 3.64 M/UL (ref 4.2–5.4)
SODIUM SERPL-SCNC: 139 MMOL/L (ref 135–145)
WBC # BLD AUTO: 7.6 K/UL (ref 4.8–10.8)

## 2018-02-21 PROCEDURE — G8979 MOBILITY GOAL STATUS: HCPCS | Mod: CH

## 2018-02-21 PROCEDURE — A9270 NON-COVERED ITEM OR SERVICE: HCPCS | Performed by: ORTHOPAEDIC SURGERY

## 2018-02-21 PROCEDURE — 80048 BASIC METABOLIC PNL TOTAL CA: CPT

## 2018-02-21 PROCEDURE — 700112 HCHG RX REV CODE 229: Performed by: ORTHOPAEDIC SURGERY

## 2018-02-21 PROCEDURE — 36415 COLL VENOUS BLD VENIPUNCTURE: CPT

## 2018-02-21 PROCEDURE — 97165 OT EVAL LOW COMPLEX 30 MIN: CPT

## 2018-02-21 PROCEDURE — 97161 PT EVAL LOW COMPLEX 20 MIN: CPT

## 2018-02-21 PROCEDURE — G8978 MOBILITY CURRENT STATUS: HCPCS | Mod: CI

## 2018-02-21 PROCEDURE — 85027 COMPLETE CBC AUTOMATED: CPT

## 2018-02-21 PROCEDURE — G8980 MOBILITY D/C STATUS: HCPCS | Mod: CI

## 2018-02-21 PROCEDURE — G8979 MOBILITY GOAL STATUS: HCPCS | Mod: CI

## 2018-02-21 PROCEDURE — 700102 HCHG RX REV CODE 250 W/ 637 OVERRIDE(OP): Performed by: ORTHOPAEDIC SURGERY

## 2018-02-21 RX ORDER — ASPIRIN 81 MG/1
81 TABLET ORAL 2 TIMES DAILY
Qty: 56 TAB | Refills: 0 | Status: SHIPPED | OUTPATIENT
Start: 2018-02-21 | End: 2018-03-21

## 2018-02-21 RX ORDER — ACETAMINOPHEN 325 MG/1
650 TABLET ORAL EVERY 6 HOURS
Qty: 30 TAB | Refills: 0 | Status: SHIPPED | OUTPATIENT
Start: 2018-02-21 | End: 2018-06-15

## 2018-02-21 RX ORDER — PSEUDOEPHEDRINE HCL 30 MG
100 TABLET ORAL 2 TIMES DAILY
Qty: 20 CAP | Refills: 2 | Status: SHIPPED | OUTPATIENT
Start: 2018-02-21 | End: 2018-02-21

## 2018-02-21 RX ORDER — ACETAMINOPHEN 325 MG/1
650 TABLET ORAL EVERY 6 HOURS
Qty: 30 TAB | Refills: 0 | COMMUNITY
Start: 2018-02-21 | End: 2018-02-21

## 2018-02-21 RX ORDER — OXYCODONE HYDROCHLORIDE 5 MG/1
5 TABLET ORAL EVERY 4 HOURS PRN
Qty: 30 TAB | Refills: 0 | Status: SHIPPED | OUTPATIENT
Start: 2018-02-21 | End: 2018-02-21

## 2018-02-21 RX ORDER — PSEUDOEPHEDRINE HCL 30 MG
100 TABLET ORAL 2 TIMES DAILY
Qty: 20 CAP | Refills: 2 | Status: SHIPPED | OUTPATIENT
Start: 2018-02-21 | End: 2018-06-15

## 2018-02-21 RX ORDER — ASPIRIN 81 MG/1
81 TABLET ORAL 2 TIMES DAILY
Qty: 30 TAB | COMMUNITY
Start: 2018-02-21 | End: 2018-02-21

## 2018-02-21 RX ORDER — OXYCODONE HYDROCHLORIDE 5 MG/1
5 TABLET ORAL EVERY 4 HOURS PRN
Qty: 30 TAB | Refills: 0 | Status: SHIPPED | OUTPATIENT
Start: 2018-02-21 | End: 2018-02-28

## 2018-02-21 RX ADMIN — DOCUSATE SODIUM 100 MG: 100 CAPSULE ORAL at 09:55

## 2018-02-21 RX ADMIN — AMLODIPINE BESYLATE 2.5 MG: 5 TABLET ORAL at 09:55

## 2018-02-21 RX ADMIN — VITAMIN D, TAB 1000IU (100/BT) 1000 UNITS: 25 TAB at 09:57

## 2018-02-21 RX ADMIN — POTASSIUM CITRATE 20 MEQ: 10 TABLET, EXTENDED RELEASE ORAL at 09:57

## 2018-02-21 RX ADMIN — ASPIRIN 81 MG: 81 TABLET, COATED ORAL at 09:55

## 2018-02-21 RX ADMIN — OMEPRAZOLE 20 MG: 20 CAPSULE, DELAYED RELEASE ORAL at 09:54

## 2018-02-21 RX ADMIN — LOSARTAN POTASSIUM 100 MG: 50 TABLET, FILM COATED ORAL at 09:56

## 2018-02-21 RX ADMIN — ACETAMINOPHEN 650 MG: 325 TABLET, FILM COATED ORAL at 11:06

## 2018-02-21 ASSESSMENT — COGNITIVE AND FUNCTIONAL STATUS - GENERAL
SUGGESTED CMS G CODE MODIFIER DAILY ACTIVITY: CJ
DRESSING REGULAR LOWER BODY CLOTHING: A LITTLE
SUGGESTED CMS G CODE MODIFIER MOBILITY: CJ
HELP NEEDED FOR BATHING: A LITTLE
CLIMB 3 TO 5 STEPS WITH RAILING: A LITTLE
DAILY ACTIVITIY SCORE: 21
TOILETING: A LITTLE
MOBILITY SCORE: 22
TURNING FROM BACK TO SIDE WHILE IN FLAT BAD: A LITTLE

## 2018-02-21 ASSESSMENT — GAIT ASSESSMENTS
DISTANCE (FEET): 75
GAIT LEVEL OF ASSIST: SUPERVISED
DEVIATION: BRADYKINETIC
ASSISTIVE DEVICE: FRONT WHEEL WALKER

## 2018-02-21 ASSESSMENT — PAIN SCALES - GENERAL
PAINLEVEL_OUTOF10: 0

## 2018-02-21 ASSESSMENT — ACTIVITIES OF DAILY LIVING (ADL): TOILETING: INDEPENDENT

## 2018-02-21 NOTE — PROGRESS NOTES
No new c/o  Pain OK  + DF/PF  AVSS  Hct 32.7  Lytes OK  Cx - negative    Plan:    50% PWB  Posterior hip precautions  DVT proph with SCDs, ASA, mobilization  D/c planning (? Home tomorrow)

## 2018-02-21 NOTE — DISCHARGE SUMMARY
DISCHARGE SUMMARY    PATIENTS NAME: Olesya Harmon    MRN: [id number]    [facility]: [id number]    ADMIT DATE:  2018    DISCHARGE DATE: 2018    ADMIT MD: Dion Dennis M.D.    DISCHARGE MD: Dion Dennis M.D.    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    SECONDARY DIAGNOSIS:none    PROCEDURES: 18  Dion Dennis M.D. Revision of right total hip arthroplasty.  Resection of pseudotumor, right hip. Repair of abductor mechanism, right hip    CONSULTATIONS: Dion Dennis M.D.     HOSPITAL COURSE: Patient is a 77 year old female with an increasingly problematic prior total hip replacement.  She failed all conservative management of the issue.  Serum metal levels were found to be elevated and aspiration was negative for infection.  Dr Olvera felt that the nature of the patients failed conservative management necessitated surgical intervention.  After explaining the indications, risks, benefits, and alternatives the patient wished to proceed with surgical intervention.  The patient was taken to the OR for the above mentioned procedure.  She had no complications and minimal blood loss. She has done well with mobilization and her pain has been well controlled with oral medications. She is now ready for DC at this time.     DISCHARGE LOCATION: home    DVT PROPHYLAXSIS:aspirin 81 mg po BID    ANTIBIOTICS:completed     WEIGHT BEARIN% partial weight bearing right hip     FOLLOW UP:  Dr Dion Dennis M.D.    DISCHARGE DIAGNOSIS:Status post revision of right total hip arthroplasty.  Resection of pseudotumor, right hip. Repair of abductor mechanism, right hip      MEDICATIONS:   Current Outpatient Prescriptions   Medication Sig Dispense Refill   • acetaminophen (TYLENOL) 325 MG Tab Take 2 Tabs by mouth every 6 hours. 30 Tab 0   • aspirin 81 MG EC tablet Take 1  Tab by mouth 2 times a day for 28 days. 56 Tab 0   • docusate sodium 100 MG Cap Take 100 mg by mouth 2 Times a Day. 20 Cap 2   • oxyCODONE immediate-release (ROXICODONE) 5 MG Tab Take 1 Tab by mouth every four hours as needed for up to 7 days. 30 Tab 0

## 2018-02-21 NOTE — PROGRESS NOTES
Patient arrived to unit from PACU, gauze and tegaderm clean dry and intact to right hip, bilateral visible purple veins noted to feet extending to calves, redness noted under left breast, blanching, no other wounds noted.

## 2018-02-21 NOTE — CARE PLAN
Problem: Communication  Goal: The ability to communicate needs accurately and effectively will improve  Outcome: PROGRESSING AS EXPECTED      Problem: Safety  Goal: Will remain free from injury  Outcome: PROGRESSING AS EXPECTED  Fall precautions in place, pt calling appropriately. Bed alarm on. Ambulating x1 w/ walker to restroom. Partial weight bearing to RLE.    Problem: Infection  Goal: Will remain free from infection  Outcome: PROGRESSING AS EXPECTED      Problem: Venous Thromboembolism (VTW)/Deep Vein Thrombosis (DVT) Prevention:  Goal: Patient will participate in Venous Thrombosis (VTE)/Deep Vein Thrombosis (DVT)Prevention Measures  Outcome: PROGRESSING AS EXPECTED  Pt compliant w/ SCD use.    Problem: Bowel/Gastric:  Goal: Normal bowel function is maintained or improved  Outcome: PROGRESSING AS EXPECTED  BM PTA.

## 2018-02-21 NOTE — OP REPORT
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    SURGICAL PROCEDURES:  1.  Revision of right total hip arthroplasty.  2.  Radical resection of pseudotumor, right hip.  3.  Repair of abductors, right hip.    SURGEON:  Dion Dennis MD    ASSISTANT:  None.    ANESTHESIOLOGIST:  Wong Rizvi MD    ANESTHETIC:  General.    ESTIMATED BLOOD LOSS:  500 mL.    IMPLANTS:  1.  Biomet OsseoTi multihole 58 mm acetabular shell.  2.  Biomet 6.5 mm screws x4 (50 mm, 30 mm, 20 mm, 15 mm).  3.  Biomet Freedom constrained liner.  4.  Biomet Bina 16x150 STS femoral stem.  5.  Biomet B60 standard Bina body.  6.  Biomet 36 standard freedom head.  7.  Stimulan beads, one batch (1 g of vancomycin and 240 mg of tobramycin).    INDICATIONS:  The patient is a 77-year-old woman who had a right total hip   arthroplasty 8 or 9 years ago.  She is unclear, specifically what was done.    She did well until about a year ago when she spontaneously dislocated her hip.    This is her first dislocation.  At that time, her hip was reduced.  We noted   some lucencies around the proximal femur and given that this was a modular   neck component, I obtained serum metal iron level.  Her sed rate and CRP at   that time were negative.  However, she did not get her metal ion levels for a   few months and then after she got them, we were not made aware.  She then   presented to the clinic about 2 months ago for followup, at which time we   noted that she did have elevated serum metal ions.  A repeat was done   confirming this.  The hip was aspirated and was negative for infection.  We   recommended revision arthroplasty for probable metalosis.  Risks were   discussed with patient.  These include bleeding, infection, neurovascular   injury, pain, stiffness, fracture, dislocation, leg  length discrepancy,   implant failure, thromboembolic phenomena, anesthetic and medical   complications, etc.    DESCRIPTION OF PROCEDURE:  The patient was identified in the preoperative   holding area, right leg was marked.  She was taken to the operating room where   general anesthetic was administered.  Intravenous antibiotics were given   including Ancef and vancomycin.  Tranexamic acid was given.  Patient was   placed in lateral decubitus position with the right side up.  Right   hindquarter was then prepped and draped in sterile fashion.  Time-out was held   to confirm the patient identity and correct surgical site.    Incision was made through the previous scar and this was carried down through   the subcutaneous fat to the IT band, which was split longitudinally.  We then   encountered a large amount of synovial fluid, which was swabbed and sent for   culture.  Placed Charnley retractor deep to the IT band.  I released the   gluteus alicia tendon from its insertion into the femur.  We noted that the   greater trochanter was essentially involved.  There was a large pseudotumor   with necrotic tissue.  I excised the peritrochanteric necrotic tissue with a   rongeur and electrocautery.    I then dislocated the hip and visualized the proximal femur.  The head was   removed.  Then, the modular neck was removed.  The neck/stem junction looked   clean; however, the head and neck junction had significant corrosion.    I then freed up the femoral component using a rongeur saw and flexible   osteotomes.  We then were able to extract the femoral component without   fracture.  The medullary tissue from the proximal femur was removed and sent   for tissue culture.    I then excised some of the capsular tissue and sent this for culture.  We then   exposed the acetabulum.  There was significant destruction of the capsular   tissues.  We had a very little difficulty exposing the acetabulum.  Dissected   back to the notch,  there was no pseudotumor extending this direction.  There   was a dead space anteriorly, but no necrotic tissue was noted.    I then removed the polyethylene liner, then removed the screws, then   reinserted the polyethylene liner and used the Explant device to free up the   cup.  There was some bone resorption along the posterior aspect.  The cup was   then removed.  The acetabular bone looked viable.  I did scrape some of this   and sent it for another tissue culture.    I then reamed 52 mm and up to 57 mm where we had good contact and then   inserted the OsseoTi cup.  Multiple screws were placed into the ileum and one   screw into the ischium.    We then prepare the proximal femur.  We reamed up to size 16 and then inserted   our 16 stem.  We then reamed up to a size B body trial.  Found good   restoration of leg lengths.  Due to the significant soft tissue destruction,   we elected to use a constrained liner.    The hip was irrigated with sterile saline via pulsatile lavage.  The femur was   irrigated.  We then inserted our Freedom liner in standard fashion.  I then   trialed again and found good leg lengths.  We washed, cleaned, and dried our   Thompson taper.  The constrained head was inserted onto the Thompson taper.  The hip   was then reduced.    I did excise a little bit more of pseudotumor from the trochanteric area as   well as from the posterior pseudocapsule.  We placed our stimulant beads   around the femoral neck.  We did a short dilute Betadine soaked.  I injected   our local anesthetic mixture to the pericapsular tissues.  I then irrigated   out the hip joint.  Placed our Stimulan beads around the femoral neck.  I then   repaired the remaining abductor musculature to the proximal femur through   bone using #1 Vicryl.  The posterior capsular and pseudocapsular tissues were   then repaired to the proximal femur with #1 Vicryl.  No deeper layer could be   reapproximated.  I then repaired the gluteus alicia  tendon with #1 Vicryl.    Repaired the IT band with #1 Vicryl.  Skin was repaired with 2-0 Vicryl and   3-0 Monocryl.  Steri-Strips were applied.  Dressings were applied.  The   patient was transferred to bed in supine position.  She was then extubated and   taken to recovery room in stable condition.    POSTOPERATIVE PLAN:  1.  Intravenous antibiotics for 24 hours.  2.  DVT prophylaxis with aspirin, SCDs, and early mobilization.  3.  A 50% partial weightbearing for 4 weeks and rest for full weightbearing.  4.  AP pelvis radiograph in recovery room.       ____________________________________     MD LAVELL RODARTE / RUTH    DD:  02/20/2018 13:48:20  DT:  02/20/2018 16:12:26    D#:  [id number]  Job#:  [id number]

## 2018-02-21 NOTE — THERAPY
"Occupational Therapy Evaluation completed.   Functional Status:  Pt required CGA for LB dressing with AE. Max verbal cues required to maintain posterior hip precautions.   Plan of Care: Will benefit from Occupational Therapy 3 times per week  Discharge Recommendations:  Equipment: Will Continue to Assess for Equipment Needs. Post-acute therapy Discharge to home with outpatient or home health for additional skilled therapy services.    See \"Rehab Therapy-Acute\" Patient Summary Report for complete documentation.    Pt lives in mobile home with adult son. Son works full time and is out of the house early in the morning. Pt has walk in shower, shower chair, raised toilet seat, reacher and sock aid. Pt. bradford'd difficulty remembering and maintaining posterior hip precautions throughout session. Discussed WB status with pt she verbalized understanding but reinforcement is required. Pt. Bradford's decreased balance, decreased safety awareness, and decreased functional independence indicating a need for Acute OT services.     "

## 2018-02-21 NOTE — THERAPY
"Physical Therapy Evaluation completed.   Bed Mobility:  Supine to Sit: Supervised  Transfers: Sit to Stand: Supervised  Gait: Level Of Assist: Supervised with Front-Wheel Walker       Plan of Care: Patient with no further skilled PT needs in the acute care setting at this time  Discharge Recommendations: Equipment: Front-Wheel Walker. Post-acute therapy Discharge to home with outpatient or home health for additional skilled therapy services.    See \"Rehab Therapy-Acute\" Patient Summary Report for complete documentation.     Pt. presents with decreased activity tolerance secondary to lack of activity prior to surgery and pain.  Pt. demonstrates steady gait using FWW and requires cueing for heel-to-toe gait mechanics. Pt. requires FWW for stability and to adhere to weight bearing status.  Pt. requires occassional redirection to questions as she gives responses unrelated to question being asked.  Pt. educated on PWB precautions and posterior PHOENIX precautions but noncompliant likely due to poor memory.  Pt. also educated and given handout on PHOENIX therex but demonstrates poor understanding and memory of exercises.  Pt. very pleasant and cooperative durings session and reports she feels safe to go home.  Pt. with no further skilled acute PT services at this time.  Pt. will likely benefit from outpatient physical therapy to further address deficits, increase bilateral LE strength, and progress from FWW.   "

## 2018-02-21 NOTE — FLOWSHEET NOTE
Assumed care of patient last night at 1900. VSS, room air. Pt slept well overnight, remained A&Ox4. Ambulating x1 assist w/ walker. Voiding w/o issue. Hip precautions in place, dressing remains C/D/I. PT/OT to see patient today, discharge pending that. IVF infusing. Call light in reach, bed alarm on.

## 2018-02-21 NOTE — CARE PLAN
Problem: Risk for Impaired Mobility  Goal: Activity Level appropriate for Discharge or Transfer    Intervention: Target activity level identified and patient is aware of this goal  Patient ambulated to bathroom and up to chair, tolerated ambulation well, partial weight bearing 50% to right lower extremity, gait steady with FWW, posterior hip precautions in place. Plan for PT and OT eval tomorrow.       Problem: Elimination  Goal: Regular urinary elimination    Intervention: Monitor ability to void  Patient able to void with no difficulty post-surgery, hx of recent UTI, patient completed abx course treatment, patient denied any problems with voiding, urine yellow and clear.       Problem: Oxygenation/Respiratory Function  Goal: Patient will Achieve/Maintain Optimal Respiratory Function    Intervention: Incentive Spirometry Promotion  Patient weaned down to room air, oxygen saturation above 95%, encouraging use of IS, patient able to reach up to 2000 ml, breath sounds clear to auscultation.

## 2018-02-21 NOTE — DISCHARGE INSTRUCTIONS
Discharge Instructions    Discharged to home by car with relative. Discharged via wheelchair, hospital escort: Yes.  Special equipment needed: Not Applicable    Be sure to schedule a follow-up appointment with your primary care doctor or any specialists as instructed.     Discharge Plan:   Influenza Vaccine Indication: Indicated: 65 years and older (not sure if patient received flu vaccine)    I understand that a diet low in cholesterol, fat, and sodium is recommended for good health. Unless I have been given specific instructions below for another diet, I accept this instruction as my diet prescription.   Other diet: Regular diet as tolerated    Special Instructions:  Leave dressing in place until 2/25 and then may remove, shower and leave open to air  Call Dr. Dennis if surgical site has new drainage  50% weight bearing to right leg   Discharge instructions for the Orthopedic Patient    Follow up with Primary Care Physician within 2 weeks of discharge to home, regarding:  Review of medications and diagnostic testing.  Surveillance for medical complications.  Workup and treatment of osteoporosis, if appropriate.     -Is this a Joint Replacement patient? Yes   Total Joint Hip Replacement Discharge Instructions    Pain  - The goal is to slowly wean off the prescription pain medicine.  - Ice can be used for pain control.  20 minutes at a time is recommended, and never directly against your skin or incision.  - Most patients are off the pain pills by 3 weeks; others may require a low level of pain medications for many months. If your pain continues to be severe, follow up with your physician.  Infection  Deep hip joint infections that require removal of the prostheses occur in less than 0.1% of patients. Lesser infections in the skin (cellulites) are more common and much more easily treated.  - Keep the incision as clean and dry as possible.  - Always wash your hands before touching your incision.  - Skin infections  tend to develop around 7-10 days after surgery, most can be treated with oral antibiotics.  - Dental Care should be delayed for 3 months after surgery, your surgeon recommends taking a dose of antibiotics 1 hour prior to any dental procedure.  After 2 years, most surgeons recommend antibiotics only before an extensive procedure.  Ask your surgeon what he recommends.  - Signs and symptoms of infection can include:  low grade fever, redness, pain, swelling and drainage from your incision.  Notify your surgeon immediately if you develop any of these symptoms.  Post op Disturbances  - Bowel habits - constipation is extremely common and is caused by a combination of anesthesia, lack of mobility and pain medicine.  Use stool softeners or laxatives if necessary. It is important not to ignore this problem, as bowel obstructions can be a serious complication after joint replacement surgery.  - Mood/Energy Level - Many patients experience a lack of energy and endurance for up to 2-3 months after surgery.  Some may also feel down and can even become depressed.  This is likely due to the postoperative anemia, change in activity level, lack of sleep, pain medicine and just the emotional reaction to the surgery itself that is a big disruption in a person’s life.  This usually passes.  If symptoms persist, follow up with your primary physician.  - Returning to work - Your surgeon will give you more specific instructions.  Generally, if you work a sedentary job requiring little standing or walking, most patients may return within 2-6 weeks.  Manual labor jobs involving walking, lifting and standing may take 3-4 months.  Your surgeon’s office can provide a release to part-time or light duty work early on in your recovery and progress you to full duty as able.  - Driving - You can begin driving an automatic shift car in 4 to 8 weeks, provided you are no longer taking narcotic pain medication. If you have a stick-shift car and your  right hip was replaced, do not begin driving until your doctor says you can.   - Avoiding falls -  throw rugs and tack down loose carpeting.  Be aware of floor hazards such as pets, small objects or uneven surfaces.   -  Airport Metal Detectors - The sensitivity of metal detectors varies and it is likely that your prosthesis will cause an alarm. Inform the  that you have an artificial joint.  Diet  - Resume your normal diet as tolerated.  - It is important to achieve a healthy nutritional status by eating a well balanced diet on a regular basis.  - Your physician may recommend that you take iron and vitamin supplements.   - Continue to drink plenty of fluids.  Shower/Bathing  - You may shower as soon as you get home from the hospital unless otherwise instructed.  - Keep your incision out of water.  To keep the incision dry when showering, cover it with a plastic bag or plastic wrap.  - Pat incision dry if it gets wet.  Don’t rub.  - Do not submerge in a bath until staples are out and the incision is completely healed. (Approximately 6-8 weeks after surgery).  Dressing Change:  Procedure (if recommended by your physician)  - Wash hands.  - Open all dressing change materials.  - Remove old dressing and discard.  - Inspect incision for redness, increase in clear drainage, yellow/green drainage, odor and surrounding skin hot to touch.  -  ABD (large gauze) pad by one corner and lay over the incision.  Be careful not to touch the inside of the dressing that will lay over the incision.  - Secure in place as instructed (Ace wrap or tape).    Swelling/Bruising  - Swelling is normal after hip replacement and can involve the thigh, knee, calf and foot.  - Swelling can last from 3-6 months.  - Elevate your leg higher than your heart while reclining.  The first week you are home you should elevate your leg an equal amount of time, as you are active.    - Anti-inflammatory pills can be taken once  you have stopped the blood thinners.  - The swelling is usually worse after you go home since you are upright for longer periods of time.  - Bruising is common and can involve the entire leg including the thigh, calf and even foot.  Bruising often does not appear until after you arrive home and it can be quite dramatic- purple, black, green.  The bruising you can see is not usually concerning and will subside without any treatment.      Blood Clot Prevention  Blood clots in the legs and the less common, but frightening, clots that travel to the lungs are a real focus of our preventative. Most patients are at standard risk for them, but those patients who are at higher risk include people who have had previous clots, a family history of clotting, smoking, diabetes, obesity, advanced age, use of estrogen and a sedentary lifestyle.    - Signs of blood clots in legs - Swelling in thigh, calf or ankle that does not go down with elevation.  Pain, heat and tenderness in calf, back of calf or groin area.  NOTE: blood clots can occur in either leg.  - You have been receiving anticoagulant therapy (blood thinners) in the hospital and you may be instructed to continue at home depending on your risk factors.  - Your risk for developing a clot continues for up to 2-3 months after surgery.  You should avoid prolonged sitting and dehydration during that time (long air trips and car trips).  If you do take a trip during this time, please get up and move around every 1- 1.5 hours.  - If you are prescribed blood thinning medication for home, follow instructions as directed. (Handouts provided if applicable).      Activity    Once you get home, you should stay active. The key is not to overdo it! While you can expect some good days and some bad days, you should notice a gradual improvement over time you should notice a gradual improvement and a gradual increase in your endurance over the next 6 to 12 months.    - Weight Bearing - If  you have undergone cemented or hybrid hip replacement, you can put some weight on the leg immediately using a cane or walker, and you should continue to use some support for 4 to 6 weeks to help the muscles recover.   - Sleeping Positions - Sleep on your back with your legs slightly apart or on your side with a regular pillow between your knees. Be sure to use the pillow for at least 6 weeks, or until your doctor says you can do without it. Sleeping on your stomach should be all right  - Sitting - For at least the first 3 months, sit only in chairs that have arms. Do not sit on low chairs, low stools, or reclining chairs. Do not cross your legs at the knees. The physical therapist will show you how to sit and stand from a chair, keeping your affected leg out in front of you. Get up and move around on a regular basis--at least once every hour.  - Walking - Walk as much as you like once your doctor gives you the go-ahead, but remember that walking is no substitute for your prescribed exercises. Walking with a pair of trekking poles is helpful and adds as much as 40% to the exercise you get when you walk  - Therapy may be needed in some cases, to strengthen your muscles and improve your gait (walking pattern).  This decision will be made at your post-operative appointment.  Follow your therapist recommended post-operative exercises (handout provided by Therapist).  - Swimming is also recommended; you can begin as soon as the sutures have been removed and the wound is healed, approximately 6 to 8 weeks after surgery. Using a pair of training fins may make swimming a more enjoyable and effective exercise.  - Other activities - Lower impact activities are preferred.  If you have specific questions, consult your Surgeon.    - Sexual activity - Your surgeon can tell you when it should be safe to resume sexual activity.      When to Call the Doctor   Call the physician if:   - Fever over 100.5? F  - Increased pain, drainage,  redness, odor or heat around the incision area  - Shaking chills  - Increased knee pain with activity and rest  - Increased pain in calf, tenderness or redness above or below the knee  - Increased swelling of calf, ankle, foot  - Sudden increased shortness of breath, sudden onset of chest pain, localized chest pain with coughing  - Incision opening  Or, if there are any questions or concerns about medications or care.       -Is this patient being discharged with medication to prevent blood clots?  Yes, Aspirin Aspirin, ASA oral tablets  What is this medicine?  ASPIRIN (AS pir in) is a pain reliever. It is used to treat mild pain and fever. This medicine is also used as directed by a doctor to prevent and to treat heart attacks, to prevent strokes, and to treat arthritis or inflammation.  This medicine may be used for other purposes; ask your health care provider or pharmacist if you have questions.  COMMON BRAND NAME(S): Aspir-Low, Aspir-Aura , Aspirtab , DINKlife Advanced Aspirin, DINKlife Aspirin Extra Strength, DINKlife Aspirin Plus, Leslie Aspirin, DINKlife Genuine Aspirin, DINKlife Womens Aspirin , Bufferin Extra Strength, Bufferin Low Dose, Bufferin  What should I tell my health care provider before I take this medicine?  They need to know if you have any of these conditions:  -anemia  -asthma  -bleeding problems  -child with chickenpox, the flu, or other viral infection  -diabetes  -gout  -if you frequently drink alcohol containing drinks  -kidney disease  -liver disease  -low level of vitamin K  -lupus  -smoke tobacco  -stomach ulcers or other problems  -an unusual or allergic reaction to aspirin, tartrazine dye, other medicines, dyes, or preservatives  -pregnant or trying to get pregnant  -breast-feeding  How should I use this medicine?  Take this medicine by mouth with a glass of water. Follow the directions on the package or prescription label. You can take this medicine with or without food. If it upsets your stomach,  take it with food. Do not take your medicine more often than directed.  Talk to your pediatrician regarding the use of this medicine in children. While this drug may be prescribed for children as young as 12 years of age for selected conditions, precautions do apply. Children and teenagers should not use this medicine to treat chicken pox or flu symptoms unless directed by a doctor.  Patients over 65 years old may have a stronger reaction and need a smaller dose.  Overdosage: If you think you have taken too much of this medicine contact a poison control center or emergency room at once.  NOTE: This medicine is only for you. Do not share this medicine with others.  What if I miss a dose?  If you are taking this medicine on a regular schedule and miss a dose, take it as soon as you can. If it is almost time for your next dose, take only that dose. Do not take double or extra doses.  What may interact with this medicine?  Do not take this medicine with any of the following medications:  -cidofovir  -ketorolac  -probenecid  This medicine may also interact with the following medications:  -alcohol  -alendronate  -bismuth subsalicylate  -flavocoxid  -herbal supplements like feverfew, garlic, erin, ginkgo biloba, horse chestnut  -medicines for diabetes or glaucoma like acetazolamide, methazolamide  -medicines for gout  -medicines that treat or prevent blood clots like enoxaparin, heparin, ticlopidine, warfarin  -other aspirin and aspirin-like medicines  -NSAIDs, medicines for pain and inflammation, like ibuprofen or naproxen  -pemetrexed  -sulfinpyrazone  -varicella live vaccine  This list may not describe all possible interactions. Give your health care provider a list of all the medicines, herbs, non-prescription drugs, or dietary supplements you use. Also tell them if you smoke, drink alcohol, or use illegal drugs. Some items may interact with your medicine.  What should I watch for while using this medicine?  If you  are treating yourself for pain, tell your doctor or health care professional if the pain lasts more than 10 days, if it gets worse, or if there is a new or different kind of pain. Tell your doctor if you see redness or swelling. Also, check with your doctor if you have a fever that lasts for more than 3 days. Only take this medicine to prevent heart attacks or blood clotting if prescribed by your doctor or health care professional.  Do not take aspirin or aspirin-like medicines with this medicine. Too much aspirin can be dangerous. Always read the labels carefully.  This medicine can irritate your stomach or cause bleeding problems. Do not smoke cigarettes or drink alcohol while taking this medicine. Do not lie down for 30 minutes after taking this medicine to prevent irritation to your throat.  If you are scheduled for any medical or dental procedure, tell your healthcare provider that you are taking this medicine. You may need to stop taking this medicine before the procedure.  What side effects may I notice from receiving this medicine?  Side effects that you should report to your doctor or health care professional as soon as possible:  -allergic reactions like skin rash, itching or hives, swelling of the face, lips, or tongue  -black, tarry stools  -bloody, coffee ground-like vomit  -breathing problems  -changes in hearing, ringing in the ears  -confusion  -general ill feeling or flu-like symptoms  -pain on swallowing  -redness, blistering, peeling or loosening of the skin, including inside the mouth or nose  -trouble passing urine or change in the amount of urine  -unusual bleeding or bruising  -unusually weak or tired  -yellowing of the eyes or skin  Side effects that usually do not require medical attention (report to your doctor or health care professional if they continue or are bothersome):  -diarrhea or constipation  -nausea, vomiting  -stomach gas, heartburn  This list may not describe all possible side  effects. Call your doctor for medical advice about side effects. You may report side effects to FDA at 0-570-PCO-5850.  Where should I keep my medicine?  Keep out of the reach of children.  Store at room temperature between 15 and 30 degrees C (59 and 86 degrees F). Protect from heat and moisture. Do not use this medicine if it has a strong vinegar smell. Throw away any unused medicine after the expiration date.  NOTE: This sheet is a summary. It may not cover all possible information. If you have questions about this medicine, talk to your doctor, pharmacist, or health care provider.  © 2014, [employer]. (3/10/2009 10:44:17 AM)      · Is patient discharged on Warfarin / Coumadin?   No     Depression / Suicide Risk    As you are discharged from this [facility] facility, it is important to learn how to keep safe from harming yourself.    Recognize the warning signs:  · Abrupt changes in personality, positive or negative- including increase in energy   · Giving away possessions  · Change in eating patterns- significant weight changes-  positive or negative  · Change in sleeping patterns- unable to sleep or sleeping all the time   · Unwillingness or inability to communicate  · Depression  · Unusual sadness, discouragement and loneliness  · Talk of wanting to die  · Neglect of personal appearance   · Rebelliousness- reckless behavior  · Withdrawal from people/activities they love  · Confusion- inability to concentrate     If you or a loved one observes any of these behaviors or has concerns about self-harm, here's what you can do:  · Talk about it- your feelings and reasons for harming yourself  · Remove any means that you might use to hurt yourself (examples: pills, rope, extension cords, firearm)  · Get professional help from the community (Mental Health, Substance Abuse, psychological counseling)  · Do not be alone:Call your Safe Contact- someone whom you trust who will be there for you.  · Call your  local CRISIS HOTLINE 608-8721 or 933-272-6662  · Call your local Children's Mobile Crisis Response Team [place] (427) 054-6638 or www.[employer]  · Call the toll free National Suicide Prevention Hotlines   · National Suicide Prevention Lifeline 577-351-OSPD (9623)  · National [place] [facility] Network 800-SUICIDE (030-0358)

## 2018-02-22 NOTE — PROGRESS NOTES
Pt dc'd home. IV removed. Pt left unit via WC with CNA escort. Personal belongings with pt when leaving unit. Pt given discharge instructions prior to leaving unit including prescription and when to visit with physician; verbalizes understanding. Copy of discharge instructions with pt and in the chart.

## 2018-02-23 ENCOUNTER — APPOINTMENT (OUTPATIENT)
Dept: RADIOLOGY | Facility: MEDICAL CENTER | Age: 78
End: 2018-02-23
Attending: EMERGENCY MEDICINE
Payer: MEDICARE

## 2018-02-23 ENCOUNTER — HOSPITAL ENCOUNTER (EMERGENCY)
Facility: MEDICAL CENTER | Age: 78
End: 2018-02-23
Attending: EMERGENCY MEDICINE
Payer: MEDICARE

## 2018-02-23 VITALS
OXYGEN SATURATION: 96 % | RESPIRATION RATE: 16 BRPM | HEIGHT: 68 IN | WEIGHT: 216.05 LBS | TEMPERATURE: 98.6 F | BODY MASS INDEX: 32.74 KG/M2 | DIASTOLIC BLOOD PRESSURE: 63 MMHG | SYSTOLIC BLOOD PRESSURE: 138 MMHG | HEART RATE: 91 BPM

## 2018-02-23 DIAGNOSIS — R41.0 CONFUSION: ICD-10-CM

## 2018-02-23 DIAGNOSIS — Z96.649: ICD-10-CM

## 2018-02-23 DIAGNOSIS — T84.098A: ICD-10-CM

## 2018-02-23 LAB
ALBUMIN SERPL BCP-MCNC: 3.5 G/DL (ref 3.2–4.9)
ALBUMIN/GLOB SERPL: 1.5 G/DL
ALP SERPL-CCNC: 71 U/L (ref 30–99)
ALT SERPL-CCNC: 12 U/L (ref 2–50)
ANION GAP SERPL CALC-SCNC: 8 MMOL/L (ref 0–11.9)
APPEARANCE UR: CLEAR
AST SERPL-CCNC: 30 U/L (ref 12–45)
BACTERIA TISS AEROBE CULT: NORMAL
BACTERIA WND AEROBE CULT: NORMAL
BASOPHILS # BLD AUTO: 0.9 % (ref 0–1.8)
BASOPHILS # BLD: 0.06 K/UL (ref 0–0.12)
BILIRUB SERPL-MCNC: 1.3 MG/DL (ref 0.1–1.5)
BILIRUB UR QL STRIP.AUTO: NEGATIVE
BUN SERPL-MCNC: 14 MG/DL (ref 8–22)
CALCIUM SERPL-MCNC: 10.5 MG/DL (ref 8.4–10.2)
CHLORIDE SERPL-SCNC: 105 MMOL/L (ref 96–112)
CO2 SERPL-SCNC: 25 MMOL/L (ref 20–33)
COLOR UR: NORMAL
CREAT SERPL-MCNC: 0.89 MG/DL (ref 0.5–1.4)
CULTURE IF INDICATED INDCX: NO UA CULTURE
EOSINOPHIL # BLD AUTO: 0.19 K/UL (ref 0–0.51)
EOSINOPHIL NFR BLD: 2.7 % (ref 0–6.9)
ERYTHROCYTE [DISTWIDTH] IN BLOOD BY AUTOMATED COUNT: 42 FL (ref 35.9–50)
GLOBULIN SER CALC-MCNC: 2.3 G/DL (ref 1.9–3.5)
GLUCOSE SERPL-MCNC: 130 MG/DL (ref 65–99)
GLUCOSE UR STRIP.AUTO-MCNC: NEGATIVE MG/DL
GRAM STN SPEC: NORMAL
GRAM STN SPEC: NORMAL
HCT VFR BLD AUTO: 33.4 % (ref 37–47)
HGB BLD-MCNC: 11.4 G/DL (ref 12–16)
IMM GRANULOCYTES # BLD AUTO: 0.01 K/UL (ref 0–0.11)
IMM GRANULOCYTES NFR BLD AUTO: 0.1 % (ref 0–0.9)
KETONES UR STRIP.AUTO-MCNC: NEGATIVE MG/DL
LEUKOCYTE ESTERASE UR QL STRIP.AUTO: NEGATIVE
LYMPHOCYTES # BLD AUTO: 0.96 K/UL (ref 1–4.8)
LYMPHOCYTES NFR BLD: 13.7 % (ref 22–41)
MCH RBC QN AUTO: 30 PG (ref 27–33)
MCHC RBC AUTO-ENTMCNC: 34.1 G/DL (ref 33.6–35)
MCV RBC AUTO: 87.9 FL (ref 81.4–97.8)
MICRO URNS: NORMAL
MONOCYTES # BLD AUTO: 0.9 K/UL (ref 0–0.85)
MONOCYTES NFR BLD AUTO: 12.9 % (ref 0–13.4)
NEUTROPHILS # BLD AUTO: 4.88 K/UL (ref 2–7.15)
NEUTROPHILS NFR BLD: 69.7 % (ref 44–72)
NITRITE UR QL STRIP.AUTO: NEGATIVE
NRBC # BLD AUTO: 0 K/UL
NRBC BLD-RTO: 0 /100 WBC
PH UR STRIP.AUTO: 5.5 [PH]
PLATELET # BLD AUTO: 242 K/UL (ref 164–446)
PMV BLD AUTO: 9.4 FL (ref 9–12.9)
POTASSIUM SERPL-SCNC: 3.6 MMOL/L (ref 3.6–5.5)
PROT SERPL-MCNC: 5.8 G/DL (ref 6–8.2)
PROT UR QL STRIP: NEGATIVE MG/DL
RBC # BLD AUTO: 3.8 M/UL (ref 4.2–5.4)
RBC UR QL AUTO: NEGATIVE
SIGNIFICANT IND 70042: NORMAL
SIGNIFICANT IND 70042: NORMAL
SITE SITE: NORMAL
SITE SITE: NORMAL
SODIUM SERPL-SCNC: 138 MMOL/L (ref 135–145)
SOURCE SOURCE: NORMAL
SOURCE SOURCE: NORMAL
SP GR UR STRIP.AUTO: <=1.005
WBC # BLD AUTO: 7 K/UL (ref 4.8–10.8)

## 2018-02-23 PROCEDURE — 70450 CT HEAD/BRAIN W/O DYE: CPT

## 2018-02-23 PROCEDURE — 81003 URINALYSIS AUTO W/O SCOPE: CPT

## 2018-02-23 PROCEDURE — 80053 COMPREHEN METABOLIC PANEL: CPT

## 2018-02-23 PROCEDURE — 36415 COLL VENOUS BLD VENIPUNCTURE: CPT

## 2018-02-23 PROCEDURE — 71045 X-RAY EXAM CHEST 1 VIEW: CPT

## 2018-02-23 PROCEDURE — 99284 EMERGENCY DEPT VISIT MOD MDM: CPT | Mod: 25

## 2018-02-23 PROCEDURE — 85025 COMPLETE CBC W/AUTO DIFF WBC: CPT

## 2018-02-23 ASSESSMENT — PAIN SCALES - GENERAL: PAINLEVEL_OUTOF10: 0

## 2018-02-23 NOTE — ED PROVIDER NOTES
"ED Provider Note    CHIEF COMPLAINT  Chief Complaint   Patient presents with   • ALOC     pt had R hip replacement o tues ad discharged wed. family states pt has been acting \"different\" since. states she has been halluciatig and getting confused. pt is poor historian but thinks she hasnt taken any narcotics since she was discharged.       HPI  Olesya Harmon is a 77 y.o. female who presents for evaluation of reported confusion. The patient is 4 days status post right total hip replacement. Surgery was reportedly uncomplicated. It was performed by Dr. Dennis. She went home and has been with her adult son and his wife for the past 2 days. They report that she has been confused, being erratic, reports seeing \"little people \"has been argumentative. There is no reported fever cough head injury. She reports no new medications. The family apparently has a prescription for narcotics but they have not picked it up from the pharmacy yet. Patient has never been confused like this. On arrival here she is alert and oriented ×4    REVIEW OF SYSTEMS  See HPI for further details. No high fevers chest pain numbness tingling weakness nausea vomiting or diarrhea All other systems are negative.     PAST MEDICAL HISTORY  Past Medical History:   Diagnosis Date   • Arthritis     left hand   • CATARACT     joan surgery   • High cholesterol    • Hypercholesteremia    • Hypertension        FAMILY HISTORY  Noncontributory    SOCIAL HISTORY  Social History     Social History   • Marital status:      Spouse name: N/A   • Number of children: N/A   • Years of education: N/A     Social History Main Topics   • Smoking status: Never Smoker   • Smokeless tobacco: Never Used   • Alcohol use No   • Drug use: No   • Sexual activity: Not on file     Other Topics Concern   • Not on file     Social History Narrative   • No narrative on file     No drugs or alcohol  SURGICAL HISTORY  Past Surgical History:   Procedure Laterality Date   • HIP " "REVISION TOTAL Right 2/20/2018    Procedure: HIP REVISION TOTAL;  Surgeon: Dion Dennis M.D.;  Location: SURGERY [facility];  Service: Orthopedics   • JOINT INJECTION DIAGNOSTIC Right 1/16/2018    Procedure: JOINT INJECTION DIAGNOSTIC - FOR HIP ASPIRATION;  Surgeon: Dion Dennis M.D.;  Location: SURGERY [facility];  Service: Orthopedics   • CATARACT PHACO WITH IOL  5/9/2013    Performed by Haja Toussaint M.D. at SURGERY [facility] ORS   • HIP ARTHROPLASTY TOTAL  11/21/2011    Performed by ANH HIRSCH at SURGERY [facility] ORS   • CARPAL TUNNEL REL     • OTHER ORTHOPEDIC SURGERY  2009?    right knee replacement, [facility]       CURRENT MEDICATIONS  Home Medications     Reviewed by Tayla Tiwari (Pharmacy Tech) on 02/23/18 at 0801  Med List Status: Unable to Obtain   Medication Last Dose Status   acetaminophen (TYLENOL) 325 MG Tab  Active   amlodipine (NORVASC) 2.5 MG Tab 2/20/2018 Active   aspirin 81 MG EC tablet  Active   docusate sodium 100 MG Cap  Active   Garlic 1000 MG Cap 2/6/2018 Active   losartan (COZAAR) 100 MG Tab 2/19/2018 Active   LOVASTATIN 40 MG PO TABS 2/19/2018 Active   oxyCODONE immediate-release (ROXICODONE) 5 MG Tab  Active   pantoprazole (PROTONIX) 40 MG Tablet Delayed Response 2/19/2018 Active   potassium citrate SR (UROCIT-K SR) 10 MEQ (1080 MG) Tab CR 2/18/2018 Active   vitamin D (CHOLECALCIFEROL) 1000 UNIT Tab 2/6/2018 Active                ALLERGIES  Allergies   Allergen Reactions   • Codeine Vomiting       PHYSICAL EXAM  VITAL SIGNS: /74   Pulse 92   Temp 37 °C (98.6 °F)   Resp (!) 34   Ht 1.727 m (5' 8\")   Wt 98 kg (216 lb 0.8 oz)   SpO2 95%   BMI 32.85 kg/m²       Constitutional: Well developed, Well nourished, No acute distress, Non-toxic appearance.   HENT: Normocephalic, Atraumatic, Bilateral external ears normal, Oropharynx moist, No oral exudates, Nose normal.   Eyes: PERRLA, EOMI, Conjunctiva normal, No discharge.   Neck: Normal " range of motion, No tenderness, Supple, No stridor.   Cardiovascular: Normal heart rate, Normal rhythm, No murmurs, No rubs, No gallops.   Thorax & Lungs: Normal breath sounds, No respiratory distress, No wheezing, No chest tenderness.   Abdomen: Bowel sounds normal, Soft, No tenderness, No masses, No pulsatile masses.   Skin: Warm, Dry, No erythema, No rash.   Back: No tenderness, No CVA tenderness.   Extremities: Surgical incision over the right hip is clean dry and intact the original dressing is in place there is no saturation no bleeding range of motion in all major joints. No tenderness to palpation or major deformities noted.   Neurologic: Alert & oriented x 3, Normal motor function, Normal sensory function, No focal deficits noted.   Psychiatric: Affect normal, Judgment normal, Mood normal.       CT-HEAD W/O   Final Result      1.  No evidence of acute intracranial process.      2.  Cerebral atrophy as well as periventricular chronic small vessel ischemic change.      DX-CHEST-PORTABLE (1 VIEW)   Final Result      No evidence of acute cardiopulmonary process.          RADIOLOGY/PROCEDURES  Results for orders placed or performed during the hospital encounter of 02/23/18   CBC WITH DIFFERENTIAL   Result Value Ref Range    WBC 7.0 4.8 - 10.8 K/uL    RBC 3.80 (L) 4.20 - 5.40 M/uL    Hemoglobin 11.4 (L) 12.0 - 16.0 g/dL    Hematocrit 33.4 (L) 37.0 - 47.0 %    MCV 87.9 81.4 - 97.8 fL    MCH 30.0 27.0 - 33.0 pg    MCHC 34.1 33.6 - 35.0 g/dL    RDW 42.0 35.9 - 50.0 fL    Platelet Count 242 164 - 446 K/uL    MPV 9.4 9.0 - 12.9 fL    Neutrophils-Polys 69.70 44.00 - 72.00 %    Lymphocytes 13.70 (L) 22.00 - 41.00 %    Monocytes 12.90 0.00 - 13.40 %    Eosinophils 2.70 0.00 - 6.90 %    Basophils 0.90 0.00 - 1.80 %    Immature Granulocytes 0.10 0.00 - 0.90 %    Nucleated RBC 0.00 /100 WBC    Neutrophils (Absolute) 4.88 2.00 - 7.15 K/uL    Lymphs (Absolute) 0.96 (L) 1.00 - 4.80 K/uL    Monos (Absolute) 0.90 (H) 0.00 - 0.85  K/uL    Eos (Absolute) 0.19 0.00 - 0.51 K/uL    Baso (Absolute) 0.06 0.00 - 0.12 K/uL    Immature Granulocytes (abs) 0.01 0.00 - 0.11 K/uL    NRBC (Absolute) 0.00 K/uL   COMP METABOLIC PANEL   Result Value Ref Range    Sodium 138 135 - 145 mmol/L    Potassium 3.6 3.6 - 5.5 mmol/L    Chloride 105 96 - 112 mmol/L    Co2 25 20 - 33 mmol/L    Anion Gap 8.0 0.0 - 11.9    Glucose 130 (H) 65 - 99 mg/dL    Bun 14 8 - 22 mg/dL    Creatinine 0.89 0.50 - 1.40 mg/dL    Calcium 10.5 (H) 8.4 - 10.2 mg/dL    AST(SGOT) 30 12 - 45 U/L    ALT(SGPT) 12 2 - 50 U/L    Alkaline Phosphatase 71 30 - 99 U/L    Total Bilirubin 1.3 0.1 - 1.5 mg/dL    Albumin 3.5 3.2 - 4.9 g/dL    Total Protein 5.8 (L) 6.0 - 8.2 g/dL    Globulin 2.3 1.9 - 3.5 g/dL    A-G Ratio 1.5 g/dL   URINALYSIS,CULTURE IF INDICATED   Result Value Ref Range    Micro Urine Req see below     Culture Indicated No UA Culture    Color Straw     Character Clear     Specific Gravity <=1.005 <1.035    Ph 5.5 5.0 - 8.0    Glucose Negative Negative mg/dL    Ketones Negative Negative mg/dL    Protein Negative Negative mg/dL    Bilirubin Negative Negative    Nitrite Negative Negative    Leukocyte Esterase Negative Negative    Occult Blood Negative Negative   ESTIMATED GFR   Result Value Ref Range    GFR If African American >60 >60 mL/min/1.73 m 2    GFR If Non African American >60 >60 mL/min/1.73 m 2        COURSE & MEDICAL DECISION MAKING  Pertinent Labs & Imaging studies reviewed. (See chart for details)  An IV was established and catheter urine test blood tests were performed. These were all normal and reassuring. CT scan of the head and chest x-ray were also performed and there is no acute findings. The patient appeared to have a normal sensorium with me and is alert and oriented. The family did have a consultation with [facility] in regards to getting referred her admitted to a brief skilled rehab facility but they ultimately declined and feel comfortable taking her  home.    FINAL IMPRESSION  1.  1. Mechanical complication of hip prosthesis, initial encounter (CMS-[facility])    2. Confusion             Electronically signed by: Emiliano Jorge, 2/23/2018 8:24 AM

## 2018-02-23 NOTE — ED NOTES
Pt D/C to home. IV removed. D/C instructions given. Ptand family v/u. Pt leaves ED with no acute changes, complaints or concerns.

## 2018-02-23 NOTE — DISCHARGE PLANNING
LEATHA met with this patient and her family bedside.  Patient declined SNF, stating that her daughter is going to stay with her and she is going to have [facility] set up outpatient PT from his office.  SW informed ERP and Nurse.

## 2018-02-23 NOTE — ED NOTES
Assessment complete. IV started PTA. Labs drawn and sent. Straight cath done and urine sent. Call light within reach

## 2018-02-23 NOTE — DISCHARGE INSTRUCTIONS
Confusion  Confusion is the inability to think with your usual speed or clarity. Confusion may come on quickly or slowly over time. How quickly the confusion comes on depends on the cause. Confusion can be due to any number of causes.  CAUSES   · Concussion, head injury, or head trauma.  · Seizures.  · Stroke.  · Fever.  · Brain tumor.  · Age related decreased brain function (dementia).  · Heightened emotional states like rage or terror.  · Mental illness in which the person loses the ability to determine what is real and what is not (hallucinations).  · Infections such as a urinary tract infection (UTI).  · Toxic effects from alcohol, drugs, or prescription medicines.  · Dehydration and an imbalance of salts in the body (electrolytes).  · Lack of sleep.  · Low blood sugar (diabetes).  · Low levels of oxygen from conditions such as chronic lung disorders.  · Drug interactions or other medicine side effects.  · Nutritional deficiencies, especially niacin, thiamine, vitamin C, or vitamin B.  · Sudden drop in body temperature (hypothermia).  · Change in routine, such as when traveling or hospitalized.  SIGNS AND SYMPTOMS   People often describe their thinking as cloudy or unclear when they are confused. Confusion can also include feeling disoriented. That means you are unaware of where or who you are. You may also not know what the date or time is. If confused, you may also have difficulty paying attention, remembering, and making decisions. Some people also act aggressively when they are confused.   DIAGNOSIS   The medical evaluation of confusion may include:  · Blood and urine tests.  · X-rays.  · Brain and nervous system tests.  · Analyzing your brain waves (electroencephalogram or EEG).  · Magnetic resonance imaging (MRI) of your head.  · Computed tomography (CT) scan of your head.  · Mental status tests in which your health care provider may ask many questions. Some of these questions may seem silly or strange,  but they are a very important test to help diagnose and treat confusion.  TREATMENT   An admission to the hospital may not be needed, but a person with confusion should not be left alone. Stay with a family member or friend until the confusion clears. Avoid alcohol, pain relievers, or sedative drugs until you have fully recovered. Do not drive until directed by your health care provider.  HOME CARE INSTRUCTIONS   What family and friends can do:  · To find out if someone is confused, ask the person to state his or her name, age, and the date. If the person is unsure or answers incorrectly, he or she is confused.  · Always introduce yourself, no matter how well the person knows you.  · Often remind the person of his or her location.  · Place a calendar and clock near the confused person.  · Help the person with his or her medicines. You may want to use a pill box, an alarm as a reminder, or give the person each dose as prescribed.  · Talk about current events and plans for the day.  · Try to keep the environment calm, quiet, and peaceful.  · Make sure the person keeps follow-up visits with his or her health care provider.  PREVENTION   Ways to prevent confusion:  · Avoid alcohol.  · Eat a balanced diet.  · Get enough sleep.  · Take medicine only as directed by your health care provider.  · Do not become isolated. Spend time with other people and make plans for your days.  · Keep careful watch on your blood sugar levels if you are diabetic.  SEEK IMMEDIATE MEDICAL CARE IF:   · You develop severe headaches, repeated vomiting, seizures, blackouts, or slurred speech.  · There is increasing confusion, weakness, numbness, restlessness, or personality changes.  · You develop a loss of balance, have marked dizziness, feel uncoordinated, or fall.  · You have delusions, hallucinations, or develop severe anxiety.  · Your family members think you need to be rechecked.     This information is not intended to replace advice given  to you by your health care provider. Make sure you discuss any questions you have with your health care provider.     Document Released: 01/25/2006 Document Revised: 01/08/2016 Document Reviewed: 01/23/2015  Elsevier Interactive Patient Education ©2016 [employer]    Hip Rehabilitation After Surgery  Exercising your hip can greatly improve the results of your hip surgery. The exercises described here are designed to help you keep full movement of your hip joint.  HOW SHOULD I EXERCISE MY HIP?  The following exercises can be done on a training mat, on the floor, on a table, or on a bed. Use whatever works best and is most comfortable for you. Perform all exercises about fifteen times on each side, three times per day or as directed.  · Lying on your back, slowly slide your foot toward your buttocks, raising your knee up off the floor. Then slowly slide your foot back down until your leg is straight again.  · Lying on your back, spread your legs as far apart as you can without feeling discomfort.  · Lying on your side, raise your leg straight up from the floor as far as is comfortable. Slowly lower the leg.  · Lying on your back, tighten up the muscle in the front of your thigh (quadriceps). You can do this by keeping your leg straight and trying to raise your heel off the floor. This helps strengthen the largest muscle supporting your knee.  · Lying on your back, tighten up the muscles of your buttocks both with the legs straight and with the knee bent at a comfortable angle while keeping your heel on the floor.  · Lying on your stomach, lift your toes off the floor toward your buttocks. Bend your knee as far as is comfortable. Tighten the muscles in your buttocks while doing this.  Follow all safety measures that are given to protect your hip. If any of these exercises cause increased pain or swelling in your joint, decrease the exercises until you are comfortable again. Then slowly increase the exercises. Call  your health care provider if you have problems or questions.      This information is not intended to replace advice given to you by your health care provider. Make sure you discuss any questions you have with your health care provider.     Document Released: 07/21/2005 Document Revised: 01/08/2016 Document Reviewed: 03/19/2015  Elsevier Interactive Patient Education ©2016 [employer]

## 2018-02-24 LAB
BACTERIA TISS AEROBE CULT: ABNORMAL
BACTERIA TISS AEROBE CULT: ABNORMAL
GRAM STN SPEC: ABNORMAL
GRAM STN SPEC: ABNORMAL
SIGNIFICANT IND 70042: ABNORMAL
SIGNIFICANT IND 70042: ABNORMAL
SITE SITE: ABNORMAL
SITE SITE: ABNORMAL
SOURCE SOURCE: ABNORMAL
SOURCE SOURCE: ABNORMAL

## 2018-02-25 LAB
BACTERIA SPEC ANAEROBE CULT: NORMAL
SIGNIFICANT IND 70042: NORMAL
SITE SITE: NORMAL
SOURCE SOURCE: NORMAL

## 2018-03-07 ENCOUNTER — HOSPITAL ENCOUNTER (OUTPATIENT)
Dept: RADIOLOGY | Facility: MEDICAL CENTER | Age: 78
End: 2018-03-07
Attending: ORTHOPAEDIC SURGERY
Payer: MEDICARE

## 2018-03-07 DIAGNOSIS — M79.89 LEG SWELLING: ICD-10-CM

## 2018-03-07 DIAGNOSIS — M79.604 PAIN OF RIGHT LOWER EXTREMITY: ICD-10-CM

## 2018-03-07 PROCEDURE — 93971 EXTREMITY STUDY: CPT | Mod: RT

## 2018-04-26 ENCOUNTER — PATIENT OUTREACH (OUTPATIENT)
Dept: HEALTH INFORMATION MANAGEMENT | Facility: OTHER | Age: 78
End: 2018-04-26

## 2018-04-26 NOTE — PROGRESS NOTES
1. Attempt #: 1    2. [facility] Verified: yes    3. Verify PCP: yes    4. Care Team Updated:       •   DME Company (gait device, O2, CPAP, etc.): YES       •   Other Specialists (eye doctor, derm, GYN, cardiology, endo, etc): YES    5.  Reviewed/Updated the following with patient:       •   Communication Preference Obtained? YES       •   Preferred Pharmacy? YES       •   Preferred Lab? YES       •   Family History (document living status of immediate family members and if + hx of cancer, diabetes, hypertension, hyperlipidemia, heart attack, stroke) NO HAS UPCOMING NP ANGELITO    6. [employer] Assist Activation: declined PENDING    7. [employer] Assist [place]: no    8. Annual Wellness Visit Scheduling  Scheduling Status:Scheduled      9. Care Gap Scheduling (Attempt to Schedule EACH Overdue Care Gap!)     Health Maintenance Due   Topic Date Due   • Annual Wellness Visit  1940   • IMM DTaP/Tdap/Td Vaccine (1 - Tdap) 12/17/1959   • PAP SMEAR  12/17/1961   • COLONOSCOPY  12/17/1990   • BONE DENSITY  12/17/2005   • MAMMOGRAM  12/09/2010        Scheduled patient for Annual Wellness Visit    10. Patient was advised: “This is a free wellness visit. The provider will screen for medical conditions to help you stay healthy. If you have other concerns to address you may be asked to discuss these at a separate visit or there may be an additional fee.”     11. Patient was informed to arrive 15 min prior to their scheduled appointment and bring in their medication bottles.

## 2018-05-03 ENCOUNTER — OFFICE VISIT (OUTPATIENT)
Dept: MEDICAL GROUP | Facility: MEDICAL CENTER | Age: 78
End: 2018-05-03
Payer: MEDICARE

## 2018-05-03 VITALS
RESPIRATION RATE: 16 BRPM | SYSTOLIC BLOOD PRESSURE: 128 MMHG | WEIGHT: 205.69 LBS | TEMPERATURE: 98.2 F | HEART RATE: 71 BPM | HEIGHT: 68 IN | OXYGEN SATURATION: 98 % | BODY MASS INDEX: 31.17 KG/M2 | DIASTOLIC BLOOD PRESSURE: 70 MMHG

## 2018-05-03 DIAGNOSIS — E66.9 OBESITY (BMI 30-39.9): ICD-10-CM

## 2018-05-03 DIAGNOSIS — R73.03 PREDIABETES: ICD-10-CM

## 2018-05-03 DIAGNOSIS — Z12.12 SCREENING FOR COLORECTAL CANCER: ICD-10-CM

## 2018-05-03 DIAGNOSIS — I10 ESSENTIAL HYPERTENSION: ICD-10-CM

## 2018-05-03 DIAGNOSIS — Z12.11 SCREENING FOR COLORECTAL CANCER: ICD-10-CM

## 2018-05-03 DIAGNOSIS — D50.0 IRON DEFICIENCY ANEMIA DUE TO CHRONIC BLOOD LOSS: ICD-10-CM

## 2018-05-03 DIAGNOSIS — E87.6 HYPOKALEMIA: ICD-10-CM

## 2018-05-03 DIAGNOSIS — E78.00 HYPERCHOLESTEROLEMIA: ICD-10-CM

## 2018-05-03 DIAGNOSIS — Z12.31 ENCOUNTER FOR SCREENING MAMMOGRAM FOR BREAST CANCER: ICD-10-CM

## 2018-05-03 PROCEDURE — 99214 OFFICE O/P EST MOD 30 MIN: CPT | Performed by: FAMILY MEDICINE

## 2018-05-03 RX ORDER — POTASSIUM CHLORIDE 20 MEQ/1
20 TABLET, EXTENDED RELEASE ORAL DAILY
Qty: 90 TAB | Refills: 3 | Status: SHIPPED | OUTPATIENT
Start: 2018-05-03 | End: 2019-05-26 | Stop reason: SDUPTHER

## 2018-05-03 ASSESSMENT — PATIENT HEALTH QUESTIONNAIRE - PHQ9: CLINICAL INTERPRETATION OF PHQ2 SCORE: 0

## 2018-05-03 NOTE — PROGRESS NOTES
CC:  Diagnoses of Essential hypertension, Hypercholesterolemia, Prediabetes, Hypokalemia, this is, Screening for colorectal cancer, Obesity (BMI 30-39.9), and Encounter for screening mammogram for breast cancer were pertinent to this visit.    HISTORY OF THE PRESENT ILLNESS: Patient is a 77 y.o. female. This pleasant patient is here today to establish care previously having seen Dr. Yahaira Mtz's practice. Patient does not know when she had her last blood work but tells me that she has not had appropriate follow-up on her various health concerns. She is willing to do a mammogram and Hemoccult card or fit test. She also has multiple chronic health problems that need follow-up.    Health Maintenance: Completed      Essential hypertension  This is a chronic health problem that is well controlled with current medications and lifestyle measures. Her BP is good at 128/70.  The patient denies chest pain, shortness of breath or dyspnea on exertion.      Hypercholesterolemia  This is a chronic health problem for this patient for which she is on lovastatin  is 80 mg at bedtime. She has not had recent blood work. We need to get blood work to know that liver is tolerating this medication and that this medication has her cholesterol under control.    Prediabetes  Is a chronic help him for this patient where she's had blood work done in the past where her blood sugars have been elevated above 126 which would qualify his diabetes. Patient was never told that she had diabetes. We will get baseline blood work and see her back.    Hypokalemia  This patient has a history of hypokalemia that was requiring her to be on potassium 20 mEq daily. She has not had that for a short time. According to the last blood work I can find when she was taking it her potassium is adequately replaced. We will restart that today.    Iron deficiency anemia due to chronic blood loss  This is a chronic health problem that was identified on blood work that  the patient had in February 2018. She was never told that she was anemic. But there are 2 different blood tests that shows that she had hemoglobins less than 11. Patient had no idea and has no history of blood loss. We will check blood work for an see her back    Allergies: Codeine    Current Outpatient Prescriptions Ordered in [facility]   Medication Sig Dispense Refill   • potassium chloride SA (KDUR) 20 MEQ Tab CR Take 1 Tab by mouth every day. 90 Tab 3   • acetaminophen (TYLENOL) 325 MG Tab Take 2 Tabs by mouth every 6 hours. 30 Tab 0   • docusate sodium 100 MG Cap Take 100 mg by mouth 2 Times a Day. 20 Cap 2   • losartan (COZAAR) 100 MG Tab Take 100 mg by mouth every day.     • amlodipine (NORVASC) 2.5 MG Tab Take 2.5 mg by mouth every day.     • vitamin D (CHOLECALCIFEROL) 1000 UNIT Tab Take 1,000 Units by mouth every day.     • Garlic 1000 MG Cap Take 1 Cap by mouth every day.     • pantoprazole (PROTONIX) 40 MG Tablet Delayed Response Take 40 mg by mouth every day.     • LOVASTATIN 40 MG PO TABS Take 80 mg by mouth every evening.       No current Epic-ordered facility-administered medications on file.        Past Medical History:   Diagnosis Date   • Arthritis     left hand   • CATARACT     joan surgery   • Essential hypertension 5/3/2018   • High cholesterol    • Hypercholesteremia    • Hypercholesterolemia 5/3/2018   • Hypertension    • Hypokalemia 5/3/2018   • Iron deficiency anemia due to chronic blood loss 5/3/2018   • Prediabetes 5/3/2018       Past Surgical History:   Procedure Laterality Date   • HIP REVISION TOTAL Right 2/20/2018    Procedure: HIP REVISION TOTAL;  Surgeon: Dion Dennis M.D.;  Location: SURGERY [facility];  Service: Orthopedics   • JOINT INJECTION DIAGNOSTIC Right 1/16/2018    Procedure: JOINT INJECTION DIAGNOSTIC - FOR HIP ASPIRATION;  Surgeon: Dion Dennis M.D.;  Location: SURGERY [facility];  Service: Orthopedics   • CATARACT PHACO WITH IOL  5/9/2013    Performed by  Haja Toussaint M.D. at SURGERY [facility]   • HIP ARTHROPLASTY TOTAL  11/21/2011    Performed by ANH HIRSCH at SURGERY [facility]   • CARPAL TUNNEL REL     • OTHER ORTHOPEDIC SURGERY  2009?    right knee replacement, Syl[place]       Social History   Substance Use Topics   • Smoking status: Never Smoker   • Smokeless tobacco: Never Used   • Alcohol use No       Social History     Social History Narrative   • No narrative on file       No family history on file.    ROS:     - Constitutional: Negative for fever, chills, unexpected weight change, and fatigue/generalized weakness.     - HEENT: Negative for headaches, vision changes, hearing changes, ear pain, ear discharge, rhinorrhea, sinus congestion, sore throat, and neck pain.      - Respiratory: Negative for cough, sputum production, chest congestion, dyspnea, wheezing, and crackles.      - Cardiovascular: Negative for chest pain, palpitations, orthopnea, and bilateral lower extremity edema.     - Gastrointestinal: Negative for heartburn, nausea, vomiting, abdominal pain, hematochezia, melena, diarrhea, constipation, and greasy/foul-smelling stools.     - Genitourinary: Negative for dysuria, polyuria, hematuria, pyuria, urinary urgency, and urinary incontinence.     - Musculoskeletal: Positive for joint pain in her left hip that limits her ability to walk for any length of time. Otherwise denies  myalgias, back pain, and joint pain.     - Skin: Negative for rash, itching, cyanotic skin color change.     - Neurological: Negative for dizziness, tingling, tremors, focal sensory deficit, focal weakness and headaches.     - Endo/Heme/Allergies: Does not bruise/bleed easily.     - Psychiatric/Behavioral: Negative for depression, suicidal/homicidal ideation and memory loss.        - NOTE: All other systems reviewed and are negative, except as in HPI.      .      Exam: Blood pressure 128/70, pulse 71, temperature 36.8 °C (98.2 °F), resp. rate 16, height  "1.727 m (5' 8\"), weight 93.3 kg (205 lb 11 oz), SpO2 98 %, not currently breastfeeding. Body mass index is 31.27 kg/m².    General: Normal appearing. No distress.  HEENT: Normocephalic. Eyes conjunctiva clear lids without ptosis, pupils equal and reactive to light accommodation, ears normal shape and contour, canals are clear bilaterally, tympanic membranes are benign, nasal mucosa benign, oropharynx is without erythema, edema or exudates.   Neck: Supple without JVD or bruit. Thyroid is not enlarged.  Pulmonary: Clear to ausculation.  Normal effort. No rales, ronchi, or wheezing.  Cardiovascular: Regular rate and rhythm without murmur. Carotid and radial pulses are intact and equal bilaterally.  Abdomen: Soft, nontender, nondistended. Normal bowel sounds. Liver and spleen are not palpable  Neurologic: Grossly nonfocal  Lymph: No cervical, supraclavicular or axillary lymph nodes are palpable  Skin: Warm and dry.  No obvious lesions.  Musculoskeletal: Normal gait. No extremity cyanosis, clubbing, or edema.  Psych: Normal mood and affect. Alert and oriented x3. Judgment and insight is normal.    Please note that this dictation was created using voice recognition software. I have made every reasonable attempt to correct obvious errors, but I expect that there are errors of grammar and possibly content that I did not discover before finalizing the note.      Assessment/Plan  1. Essential hypertension  Controlled, continue with current meds and lifestyle.      2. Hypercholesterolemia  Unknown control, patient states that she's been on lovastatin 80 mg daily and has not had a follow-up blood test for evaluation of adequacy.  - LIPID PROFILE; Future  - COMP METABOLIC PANEL; Future  - TSH WITH REFLEX TO FT4; Future    3. Prediabetes  Uncontrolled, this patient had a blood test done in February that shows she had a blood sugar of 140. She was never told she had that level of sugar. She's never been told she had any problems " with her sugars. She possibly is a new diabetic. We will check blood work and see her back.  - HEMOGLOBIN A1C; Future    4. Hypokalemia  Currently controlled as far as I can tell    5. Iron deficiency anemia  Uncontrolled and unknown etiology. This patient had blood work in February that shows she was anemic but had no follow-up and was not told she ever had the problem. We will start the workup  - CBC WITH DIFFERENTIAL; Future  - IRON/TOTAL IRON BIND; Future  - FERRITIN; Future    6. Screening for colorectal cancer  Patient needs to do a Hemoccult test she states that she's had 3 colonoscopies since the age of 50 and was always told they were fine  - OCCULT BLOOD FECES IMMUNOASSAY (FIT); Future    7. Obesity (BMI 30-39.9)  Uncontrolled, patient working on weight loss  - Patient identified as having weight management issue.  Appropriate orders and counseling given.    8. Encounter for screening mammogram for breast cancer  Patient will get a mammogram  - MA-SCREEN MAMMO W/CAD-BILAT; Future

## 2018-05-03 NOTE — ASSESSMENT & PLAN NOTE
Is a chronic help him for this patient where she's had blood work done in the past where her blood sugars have been elevated above 126 which would qualify his diabetes. Patient was never told that she had diabetes. We will get baseline blood work and see her back.

## 2018-05-03 NOTE — ASSESSMENT & PLAN NOTE
This is a chronic health problem for this patient for which she is on lovastatin  is 80 mg at bedtime. She has not had recent blood work. We need to get blood work to know that liver is tolerating this medication and that this medication has her cholesterol under control.

## 2018-05-03 NOTE — ASSESSMENT & PLAN NOTE
This is a chronic health problem that is well controlled with current medications and lifestyle measures. Her BP is good at 128/70.  The patient denies chest pain, shortness of breath or dyspnea on exertion.

## 2018-05-03 NOTE — ASSESSMENT & PLAN NOTE
This patient has a history of hypokalemia that was requiring her to be on potassium 20 mEq daily. She has not had that for a short time. According to the last blood work I can find when she was taking it her potassium is adequately replaced. We will restart that today.

## 2018-05-03 NOTE — ASSESSMENT & PLAN NOTE
This is a chronic health problem that was identified on blood work that the patient had in February 2018. She was never told that she was anemic. But there are 2 different blood tests that shows that she had hemoglobins less than 11. Patient had no idea and has no history of blood loss. We will check blood work for an see her back

## 2018-05-10 ENCOUNTER — HOSPITAL ENCOUNTER (OUTPATIENT)
Dept: RADIOLOGY | Facility: MEDICAL CENTER | Age: 78
End: 2018-05-10
Attending: FAMILY MEDICINE
Payer: MEDICARE

## 2018-05-10 DIAGNOSIS — Z12.31 ENCOUNTER FOR SCREENING MAMMOGRAM FOR BREAST CANCER: ICD-10-CM

## 2018-05-10 PROCEDURE — 77067 SCR MAMMO BI INCL CAD: CPT

## 2018-05-25 ENCOUNTER — HOSPITAL ENCOUNTER (OUTPATIENT)
Dept: LAB | Facility: MEDICAL CENTER | Age: 78
End: 2018-05-25
Attending: FAMILY MEDICINE
Payer: MEDICARE

## 2018-05-25 DIAGNOSIS — R73.03 PREDIABETES: ICD-10-CM

## 2018-05-25 DIAGNOSIS — D50.0 IRON DEFICIENCY ANEMIA DUE TO CHRONIC BLOOD LOSS: ICD-10-CM

## 2018-05-25 DIAGNOSIS — E78.00 HYPERCHOLESTEROLEMIA: ICD-10-CM

## 2018-05-25 LAB
ALBUMIN SERPL BCP-MCNC: 4.1 G/DL (ref 3.2–4.9)
ALBUMIN/GLOB SERPL: 1.5 G/DL
ALP SERPL-CCNC: 90 U/L (ref 30–99)
ALT SERPL-CCNC: 7 U/L (ref 2–50)
ANION GAP SERPL CALC-SCNC: 9 MMOL/L (ref 0–11.9)
AST SERPL-CCNC: 14 U/L (ref 12–45)
BASOPHILS # BLD AUTO: 1.1 % (ref 0–1.8)
BASOPHILS # BLD: 0.05 K/UL (ref 0–0.12)
BILIRUB SERPL-MCNC: 0.9 MG/DL (ref 0.1–1.5)
BUN SERPL-MCNC: 18 MG/DL (ref 8–22)
CALCIUM SERPL-MCNC: 9.4 MG/DL (ref 8.5–10.5)
CHLORIDE SERPL-SCNC: 106 MMOL/L (ref 96–112)
CHOLEST SERPL-MCNC: 159 MG/DL (ref 100–199)
CO2 SERPL-SCNC: 26 MMOL/L (ref 20–33)
CREAT SERPL-MCNC: 0.84 MG/DL (ref 0.5–1.4)
EOSINOPHIL # BLD AUTO: 0.13 K/UL (ref 0–0.51)
EOSINOPHIL NFR BLD: 2.8 % (ref 0–6.9)
ERYTHROCYTE [DISTWIDTH] IN BLOOD BY AUTOMATED COUNT: 41.3 FL (ref 35.9–50)
EST. AVERAGE GLUCOSE BLD GHB EST-MCNC: 123 MG/DL
FERRITIN SERPL-MCNC: 113.9 NG/ML (ref 10–291)
GLOBULIN SER CALC-MCNC: 2.8 G/DL (ref 1.9–3.5)
GLUCOSE SERPL-MCNC: 105 MG/DL (ref 65–99)
HBA1C MFR BLD: 5.9 % (ref 0–5.6)
HCT VFR BLD AUTO: 41.5 % (ref 37–47)
HDLC SERPL-MCNC: 55 MG/DL
HGB BLD-MCNC: 13.8 G/DL (ref 12–16)
IMM GRANULOCYTES # BLD AUTO: 0.02 K/UL (ref 0–0.11)
IMM GRANULOCYTES NFR BLD AUTO: 0.4 % (ref 0–0.9)
IRON SATN MFR SERPL: 13 % (ref 15–55)
IRON SERPL-MCNC: 47 UG/DL (ref 40–170)
LDLC SERPL CALC-MCNC: 90 MG/DL
LYMPHOCYTES # BLD AUTO: 1.1 K/UL (ref 1–4.8)
LYMPHOCYTES NFR BLD: 24 % (ref 22–41)
MCH RBC QN AUTO: 29.1 PG (ref 27–33)
MCHC RBC AUTO-ENTMCNC: 33.3 G/DL (ref 33.6–35)
MCV RBC AUTO: 87.6 FL (ref 81.4–97.8)
MONOCYTES # BLD AUTO: 0.4 K/UL (ref 0–0.85)
MONOCYTES NFR BLD AUTO: 8.7 % (ref 0–13.4)
NEUTROPHILS # BLD AUTO: 2.89 K/UL (ref 2–7.15)
NEUTROPHILS NFR BLD: 63 % (ref 44–72)
NRBC # BLD AUTO: 0 K/UL
NRBC BLD-RTO: 0 /100 WBC
PLATELET # BLD AUTO: 292 K/UL (ref 164–446)
PMV BLD AUTO: 9.7 FL (ref 9–12.9)
POTASSIUM SERPL-SCNC: 3.8 MMOL/L (ref 3.6–5.5)
PROT SERPL-MCNC: 6.9 G/DL (ref 6–8.2)
RBC # BLD AUTO: 4.74 M/UL (ref 4.2–5.4)
SODIUM SERPL-SCNC: 141 MMOL/L (ref 135–145)
TIBC SERPL-MCNC: 358 UG/DL (ref 250–450)
TRIGL SERPL-MCNC: 70 MG/DL (ref 0–149)
TSH SERPL DL<=0.005 MIU/L-ACNC: 2.56 UIU/ML (ref 0.38–5.33)
WBC # BLD AUTO: 4.6 K/UL (ref 4.8–10.8)

## 2018-05-25 PROCEDURE — 83540 ASSAY OF IRON: CPT

## 2018-05-25 PROCEDURE — 85025 COMPLETE CBC W/AUTO DIFF WBC: CPT

## 2018-05-25 PROCEDURE — 80053 COMPREHEN METABOLIC PANEL: CPT

## 2018-05-25 PROCEDURE — 83550 IRON BINDING TEST: CPT

## 2018-05-25 PROCEDURE — 82728 ASSAY OF FERRITIN: CPT

## 2018-05-25 PROCEDURE — 84443 ASSAY THYROID STIM HORMONE: CPT

## 2018-05-25 PROCEDURE — 80061 LIPID PANEL: CPT

## 2018-05-25 PROCEDURE — 36415 COLL VENOUS BLD VENIPUNCTURE: CPT

## 2018-05-25 PROCEDURE — 83036 HEMOGLOBIN GLYCOSYLATED A1C: CPT

## 2018-05-26 ENCOUNTER — HOSPITAL ENCOUNTER (OUTPATIENT)
Facility: MEDICAL CENTER | Age: 78
End: 2018-05-26
Attending: FAMILY MEDICINE
Payer: MEDICARE

## 2018-05-26 PROCEDURE — 82274 ASSAY TEST FOR BLOOD FECAL: CPT

## 2018-05-29 ENCOUNTER — TELEPHONE (OUTPATIENT)
Dept: MEDICAL GROUP | Facility: MEDICAL CENTER | Age: 78
End: 2018-05-29

## 2018-05-29 NOTE — TELEPHONE ENCOUNTER
ESTABLISHED PATIENT PRE-VISIT PLANNING     Note: Patient will not be contacted if there is no indication to call.     1.  Reviewed notes from the last few office visits within the medical group: Yes 05/03/2018    2.  If any orders were placed at last visit or intended to be done for this visit (i.e. 6 mos follow-up), do we have Results/Consult Notes?        •  Labs - Labs ordered, completed on 05/25/2018 and results are in chart.   Note: If patient appointment is for lab review and patient did not complete labs, check with provider if OK to reschedule patient until labs completed.       •  Imaging - Imaging ordered, completed and results are in chart.       •  Referrals - No referrals were ordered at last office visit.    3. Is this appointment scheduled as a Hospital Follow-Up? No    4.  Immunizations were updated in [facility] using WebIZ?: Yes       •  Web Iz Recommendations: TDAP and ZOSTAVAX (Shingles)    5.  Patient is due for the following Health Maintenance Topics:   Health Maintenance Due   Topic Date Due   • Annual Wellness Visit  1940   • IMM DTaP/Tdap/Td Vaccine (1 - Tdap) 12/17/1959   • COLON CANCER SCREENING ANNUAL FIT (ORDERED 05/03/2018) 12/17/1990   • BONE DENSITY  12/17/2005     *DUE TO PT AGE UNSURE IF DUE FOR BONE DENSITY       6.  MDX printed for Provider? YES    7.  Patient was NOT informed to arrive 15 min prior to their scheduled appointment and bring in their medication bottles.

## 2018-05-31 DIAGNOSIS — Z12.12 SCREENING FOR COLORECTAL CANCER: ICD-10-CM

## 2018-05-31 DIAGNOSIS — Z12.11 SCREENING FOR COLORECTAL CANCER: ICD-10-CM

## 2018-05-31 RX ORDER — AMLODIPINE BESYLATE 2.5 MG/1
2.5 TABLET ORAL DAILY
Qty: 90 TAB | Refills: 3 | Status: SHIPPED | OUTPATIENT
Start: 2018-05-31 | End: 2019-05-26 | Stop reason: SDUPTHER

## 2018-05-31 NOTE — TELEPHONE ENCOUNTER
----- Message from Fadumo Panda sent at 5/31/2018 10:34 AM PDT -----  1. Caller Name: Olesya                        2. Call Back Number: [phone]    3. Patient PCP: Dr. Garcia    4. What is the patient requesting: Refill of Amlodipine 2.5 mg. Confirmed pharmacy. She will take the last of her current meds and will be out Sunday. Thank you.     5. Does patient need immediate contact: no

## 2018-06-01 LAB — HEMOCCULT STL QL IA: NEGATIVE

## 2018-06-04 ENCOUNTER — OFFICE VISIT (OUTPATIENT)
Dept: MEDICAL GROUP | Facility: MEDICAL CENTER | Age: 78
End: 2018-06-04
Payer: MEDICARE

## 2018-06-04 VITALS
WEIGHT: 205 LBS | SYSTOLIC BLOOD PRESSURE: 134 MMHG | BODY MASS INDEX: 31.07 KG/M2 | TEMPERATURE: 98.2 F | DIASTOLIC BLOOD PRESSURE: 60 MMHG | RESPIRATION RATE: 14 BRPM | OXYGEN SATURATION: 96 % | HEIGHT: 68 IN | HEART RATE: 86 BPM

## 2018-06-04 DIAGNOSIS — I10 ESSENTIAL HYPERTENSION: ICD-10-CM

## 2018-06-04 DIAGNOSIS — E78.00 HYPERCHOLESTEROLEMIA: ICD-10-CM

## 2018-06-04 DIAGNOSIS — E66.9 OBESITY (BMI 30-39.9): ICD-10-CM

## 2018-06-04 DIAGNOSIS — Z78.0 MENOPAUSE: ICD-10-CM

## 2018-06-04 DIAGNOSIS — D50.0 IRON DEFICIENCY ANEMIA DUE TO CHRONIC BLOOD LOSS: ICD-10-CM

## 2018-06-04 DIAGNOSIS — K21.9 GASTROESOPHAGEAL REFLUX DISEASE WITHOUT ESOPHAGITIS: ICD-10-CM

## 2018-06-04 DIAGNOSIS — R73.03 PREDIABETES: ICD-10-CM

## 2018-06-04 PROBLEM — E87.6 HYPOKALEMIA: Status: RESOLVED | Noted: 2018-05-03 | Resolved: 2018-06-04

## 2018-06-04 PROCEDURE — 99214 OFFICE O/P EST MOD 30 MIN: CPT | Performed by: FAMILY MEDICINE

## 2018-06-04 RX ORDER — LOSARTAN POTASSIUM 100 MG/1
100 TABLET ORAL DAILY
Qty: 90 TAB | Refills: 3 | Status: SHIPPED | OUTPATIENT
Start: 2018-06-04 | End: 2019-07-22 | Stop reason: SDUPTHER

## 2018-06-04 RX ORDER — PANTOPRAZOLE SODIUM 40 MG/1
40 TABLET, DELAYED RELEASE ORAL DAILY
Qty: 90 TAB | Refills: 3 | Status: SHIPPED | OUTPATIENT
Start: 2018-06-04 | End: 2019-08-21 | Stop reason: SDUPTHER

## 2018-06-04 RX ORDER — LOVASTATIN 40 MG/1
80 TABLET ORAL EVERY EVENING
Qty: 180 TAB | Refills: 3 | Status: SHIPPED | OUTPATIENT
Start: 2018-06-04 | End: 2019-10-04 | Stop reason: SDUPTHER

## 2018-06-04 NOTE — ASSESSMENT & PLAN NOTE
This is a chronic health problem for this patient where her blood sugar is only slightly elevated at 105 and her A1c was slightly elevated at 5.9.  Patient tries to follow weight watcher's and be careful with her dietary choices.  We will continue to follow with her.

## 2018-06-04 NOTE — ASSESSMENT & PLAN NOTE
This patient went through natural menopause and has never had a bone density test.  She is now 77 years of age.  We will order a DEXA scan and then can go over that result when we see her back in mid June for her annual wellness visit.

## 2018-06-04 NOTE — ASSESSMENT & PLAN NOTE
This was a chronic health problem for this patient in the past.  Possibly before she got put on generic Protonix.  Currently her hemoglobin is 13.8 and her hematocrit is 41.5.  She does not take iron supplementation and her serum iron level is excellent at 47 with a TIBC that is normal at 358 and a ferritin level that is excellent at 113.9.  We will resolve this issue because I believe this was noted prior to her being put on Protonix.

## 2018-06-04 NOTE — PROGRESS NOTES
CC:Diagnoses of Essential hypertension, Hypercholesterolemia, Gastroesophageal reflux disease without esophagitis, Prediabetes, Iron deficiency anemia due to chronic blood loss, Obesity (BMI 30-39.9), and Menopause were pertinent to this visit.      HISTORY OF PRESENT ILLNESS: Patient is a 77 y.o. female established patient who presents today to follow-up on her chronic health problems as outlined below.  We also had to finish going over her health history.  Patient reports that her blood pressure was initially elevated when she got into the room but states that that is not typical for her.  She has no concerns about pain, shortness of breath or dyspnea on exertion.  Her blood pressure did come down nicely after 5 minutes.    Health Maintenance: Completed    Essential hypertension  This is a chronic health problem that is well controlled with current medications and lifestyle measures.  The patient denies chest pain, shortness of breath or dyspnea on exertion.      Gastroesophageal reflux disease without esophagitis  This is a chronic health problem for this patient for which she was placed on generic Protonix 40 mg daily.  She had an EGD which showed that she did have esophagitis.  She also gives a history that in the evening she has some episodes of achalasia.  She is currently taking her generic Protonix first thing in the morning before she eats any food and typically has cereal if she eats much at all.  I explained her that this medication works better if it is taken 30 minutes before protein containing meal.  I was recommended people take it as soon as they start taking or thinking about whether we can make for dinner.  Then it will be in her system working and probably resolve the achalasia.    Hypercholesterolemia  This is a chronic health problem for which the patient is on lovastatin 80 mg daily.  Her total cholesterol is down to 159, triglycerides are excellent at 70, HDL excellent at 55 and her LDL is  90.  There is no liver dysfunction.  We will have her continue with current meds.    Iron deficiency anemia due to chronic blood loss  This was a chronic health problem for this patient in the past.  Possibly before she got put on generic Protonix.  Currently her hemoglobin is 13.8 and her hematocrit is 41.5.  She does not take iron supplementation and her serum iron level is excellent at 47 with a TIBC that is normal at 358 and a ferritin level that is excellent at 113.9.  We will resolve this issue because I believe this was noted prior to her being put on Protonix.    Obesity (BMI 30-39.9)  This is a chronic health problem that is well controlled with current medications and lifestyle measures.  Patient continues to follow weight watchers and has kept off the 40lbs she lost in 2015.    Prediabetes  This is a chronic health problem for this patient where her blood sugar is only slightly elevated at 105 and her A1c was slightly elevated at 5.9.  Patient tries to follow weight watcher's and be careful with her dietary choices.  We will continue to follow with her.    Menopause  This patient went through natural menopause and has never had a bone density test.  She is now 77 years of age.  We will order a DEXA scan and then can go over that result when we see her back in mid June for her annual wellness visit.      Patient Active Problem List    Diagnosis Date Noted   • Gastroesophageal reflux disease without esophagitis 06/04/2018   • Menopause 06/04/2018   • Essential hypertension 05/03/2018   • Hypercholesterolemia 05/03/2018   • Prediabetes 05/03/2018   • Iron deficiency anemia due to chronic blood loss 05/03/2018   • Obesity (BMI 30-39.9) 05/03/2018   • Mechanical complication of hip prosthesis, initial encounter ([facility]) 02/21/2018      Allergies:Codeine    Current Outpatient Prescriptions   Medication Sig Dispense Refill   • losartan (COZAAR) 100 MG Tab Take 1 Tab by mouth every day. 90 Tab 3   • lovastatin  (MEVACOR) 40 MG tablet Take 2 Tabs by mouth every evening. 180 Tab 3   • pantoprazole (PROTONIX) 40 MG Tablet Delayed Response Take 1 Tab by mouth every day. 90 Tab 3   • amLODIPine (NORVASC) 2.5 MG Tab Take 1 Tab by mouth every day. 90 Tab 3   • potassium chloride SA (KDUR) 20 MEQ Tab CR Take 1 Tab by mouth every day. 90 Tab 3   • acetaminophen (TYLENOL) 325 MG Tab Take 2 Tabs by mouth every 6 hours. 30 Tab 0   • docusate sodium 100 MG Cap Take 100 mg by mouth 2 Times a Day. 20 Cap 2   • vitamin D (CHOLECALCIFEROL) 1000 UNIT Tab Take 1,000 Units by mouth every day.     • Garlic 1000 MG Cap Take 1 Cap by mouth every day.       No current facility-administered medications for this visit.        Social History   Substance Use Topics   • Smoking status: Never Smoker   • Smokeless tobacco: Never Used   • Alcohol use No     Social History     Social History Narrative   • No narrative on file       Family History   Problem Relation Age of Onset   • Other Mother      cause unknown   • Kidney Disease Father      went off dialysis       Review of Systems:      - Constitutional: Negative for fever, chills, unexpected weight change, and fatigue/generalized weakness.     - HEENT: Negative for headaches, vision changes, hearing changes, ear pain, ear discharge, rhinorrhea, sinus congestion, sore throat, and neck pain.      - Respiratory: Negative for cough, sputum production, chest congestion, dyspnea, wheezing, and crackles.      - Cardiovascular: Negative for chest pain, palpitations, orthopnea, and bilateral lower extremity edema.     - Gastrointestinal: Negative for heartburn, nausea, vomiting, abdominal pain, hematochezia, melena, diarrhea, constipation, and greasy/foul-smelling stools.     - Genitourinary: Negative for dysuria, polyuria, hematuria, pyuria, urinary urgency, and urinary incontinence.    - Musculoskeletal: Patient lives with chronic musculoskeletal/joint pain multiple sites including hips and knees.   "Otherwise denies myalgias, back pain, and joint pain.     - Skin: Negative for rash, itching, cyanotic skin color change.     - Neurological: Negative for dizziness, tingling, tremors, focal sensory deficit, focal weakness and headaches.     - Endo/Heme/Allergies: Does not bruise/bleed easily.     - Psychiatric/Behavioral: Negative for depression, suicidal/homicidal ideation and memory loss.          - NOTE: All other systems reviewed and are negative, except as in HPI.    Exam:    Blood pressure 134/60, pulse 86, temperature 36.8 °C (98.2 °F), resp. rate 14, height 1.727 m (5' 8\"), weight 93 kg (205 lb), SpO2 96 %, not currently breastfeeding. Body mass index is 31.17 kg/m².    General:  Well nourished, well developed female in NAD  LABS: 5/25/18: Results reviewed and discussed with the patient, questions answered.    Patient was seen for 30 minutes face to face of which, 25 minutes was spent counseling regarding the above mentioned problems.  Assessment/Plan:  1. Essential hypertension  Well-controlled with current medication.  I refilled her losartan for 90 days with 3 refills.  We will continue to see her throughout the year.  - losartan (COZAAR) 100 MG Tab; Take 1 Tab by mouth every day.  Dispense: 90 Tab; Refill: 3    2. Hypercholesterolemia  Well-controlled with current medications.  Her blood work is excellent with no liver dysfunction from the lovastatin 80 mg daily.  I renewed her prescription for the coming year.  We will plan to see her back in approximately 4-1/2 months.  We see her in the middle of this month to do her annual wellness visit.  - lovastatin (MEVACOR) 40 MG tablet; Take 2 Tabs by mouth every evening.  Dispense: 180 Tab; Refill: 3  - LIPID PROFILE; Future  - COMP METABOLIC PANEL; Future    3. Gastroesophageal reflux disease without esophagitis  Controlled, patient is not having reflux but is experiencing occasional achalasia.  I recommended moving the generic Protonix to prior to her " dinner.  She will try that and let me know how it is working for her  - pantoprazole (PROTONIX) 40 MG Tablet Delayed Response; Take 1 Tab by mouth every day.  Dispense: 90 Tab; Refill: 3    4. Prediabetes  This is a chronic health problem that is adequately controlled.  Her fasting glucose was on 105 and her A1c is 5.9.  We will continue to follow over time.    5. Iron deficiency anemia due to chronic blood loss  This is well controlled without iron replacement.  We will resolve this issue.    6. Obesity (BMI 30-39.9)  Controlled, continue with current meds and lifestyle.  Patient working on weight watchers and trying to maintain current weight.    7. Menopause  Uncontrolled, patient has never had a DEXA scan.  She would like to get a baseline study and discuss at her wellness visit.  - DS-BONE DENSITY STUDY (DEXA)

## 2018-06-04 NOTE — ASSESSMENT & PLAN NOTE
This is a chronic health problem that is well controlled with current medications and lifestyle measures.  The patient denies chest pain, shortness of breath or dyspnea on exertion.

## 2018-06-04 NOTE — ASSESSMENT & PLAN NOTE
This is a chronic health problem for this patient for which she was placed on generic Protonix 40 mg daily.  She had an EGD which showed that she did have esophagitis.  She also gives a history that in the evening she has some episodes of achalasia.  She is currently taking her generic Protonix first thing in the morning before she eats any food and typically has cereal if she eats much at all.  I explained her that this medication works better if it is taken 30 minutes before protein containing meal.  I was recommended people take it as soon as they start taking or thinking about whether we can make for dinner.  Then it will be in her system working and probably resolve the achalasia.

## 2018-06-04 NOTE — ASSESSMENT & PLAN NOTE
This is a chronic health problem for which the patient is on lovastatin 80 mg daily.  Her total cholesterol is down to 159, triglycerides are excellent at 70, HDL excellent at 55 and her LDL is 90.  There is no liver dysfunction.  We will have her continue with current meds.

## 2018-06-07 ENCOUNTER — TELEPHONE (OUTPATIENT)
Dept: MEDICAL GROUP | Facility: MEDICAL CENTER | Age: 78
End: 2018-06-07

## 2018-06-07 NOTE — TELEPHONE ENCOUNTER
Future Appointments       Provider Department Center    6/15/2018 1:00 PM Ainsley Clark M.D.; [facility] SBrendan Johnson    10/15/2018 1:20 PM Ainsley Clark M.D. [facility]        ANNUAL WELLNESS VISIT PRE-VISIT PLANNING WITH OUTREACH    1.  Immunizations were updated in Epic using WebIZ?:Yes       •  WebIZ Recommendations: TDAP and SHINGRIX (Shingles)       •  Is patient due for Tdap? YES. Patient was not notified of copay/out of pocket cost.       •  Is patient due for Shingles?YES. Patient was not notified of copay/out of pocket cost.    2.  MDX printed for Provider? YES     Health Maintenance Due   Topic Date Due   • Annual Wellness Visit  1940   • IMM DTaP/Tdap/Td Vaccine (1 - Tdap) 12/17/1959   • BONE DENSITY (ordered 06/04/2018) 12/17/2005

## 2018-06-12 ENCOUNTER — HOSPITAL ENCOUNTER (OUTPATIENT)
Dept: LAB | Facility: MEDICAL CENTER | Age: 78
End: 2018-06-12
Attending: FAMILY MEDICINE
Payer: MEDICARE

## 2018-06-12 DIAGNOSIS — E78.00 HYPERCHOLESTEROLEMIA: ICD-10-CM

## 2018-06-12 LAB
ALBUMIN SERPL BCP-MCNC: 4.2 G/DL (ref 3.2–4.9)
ALBUMIN/GLOB SERPL: 1.4 G/DL
ALP SERPL-CCNC: 90 U/L (ref 30–99)
ALT SERPL-CCNC: 8 U/L (ref 2–50)
ANION GAP SERPL CALC-SCNC: 8 MMOL/L (ref 0–11.9)
AST SERPL-CCNC: 17 U/L (ref 12–45)
BILIRUB SERPL-MCNC: 0.9 MG/DL (ref 0.1–1.5)
BUN SERPL-MCNC: 25 MG/DL (ref 8–22)
CALCIUM SERPL-MCNC: 9.6 MG/DL (ref 8.5–10.5)
CHLORIDE SERPL-SCNC: 106 MMOL/L (ref 96–112)
CHOLEST SERPL-MCNC: 168 MG/DL (ref 100–199)
CO2 SERPL-SCNC: 26 MMOL/L (ref 20–33)
CREAT SERPL-MCNC: 0.95 MG/DL (ref 0.5–1.4)
GLOBULIN SER CALC-MCNC: 2.9 G/DL (ref 1.9–3.5)
GLUCOSE SERPL-MCNC: 103 MG/DL (ref 65–99)
HDLC SERPL-MCNC: 53 MG/DL
LDLC SERPL CALC-MCNC: 97 MG/DL
POTASSIUM SERPL-SCNC: 3.9 MMOL/L (ref 3.6–5.5)
PROT SERPL-MCNC: 7.1 G/DL (ref 6–8.2)
SODIUM SERPL-SCNC: 140 MMOL/L (ref 135–145)
TRIGL SERPL-MCNC: 89 MG/DL (ref 0–149)

## 2018-06-12 PROCEDURE — 36415 COLL VENOUS BLD VENIPUNCTURE: CPT

## 2018-06-12 PROCEDURE — 80061 LIPID PANEL: CPT

## 2018-06-12 PROCEDURE — 80053 COMPREHEN METABOLIC PANEL: CPT

## 2018-06-15 ENCOUNTER — DOCUMENTATION (OUTPATIENT)
Dept: MEDICAL GROUP | Facility: MEDICAL CENTER | Age: 78
End: 2018-06-15

## 2018-06-15 ENCOUNTER — OFFICE VISIT (OUTPATIENT)
Dept: MEDICAL GROUP | Facility: MEDICAL CENTER | Age: 78
End: 2018-06-15
Payer: MEDICARE

## 2018-06-15 VITALS
TEMPERATURE: 98 F | OXYGEN SATURATION: 98 % | HEIGHT: 68 IN | RESPIRATION RATE: 16 BRPM | SYSTOLIC BLOOD PRESSURE: 126 MMHG | WEIGHT: 196.43 LBS | HEART RATE: 76 BPM | DIASTOLIC BLOOD PRESSURE: 62 MMHG | BODY MASS INDEX: 29.77 KG/M2

## 2018-06-15 DIAGNOSIS — E66.9 OBESITY (BMI 30-39.9): ICD-10-CM

## 2018-06-15 DIAGNOSIS — K21.9 GASTROESOPHAGEAL REFLUX DISEASE WITHOUT ESOPHAGITIS: ICD-10-CM

## 2018-06-15 DIAGNOSIS — E78.00 HYPERCHOLESTEROLEMIA: ICD-10-CM

## 2018-06-15 DIAGNOSIS — M46.1 SACROILIITIS, NOT ELSEWHERE CLASSIFIED (HCC): ICD-10-CM

## 2018-06-15 DIAGNOSIS — Z78.0 MENOPAUSE: ICD-10-CM

## 2018-06-15 DIAGNOSIS — I10 ESSENTIAL HYPERTENSION: ICD-10-CM

## 2018-06-15 DIAGNOSIS — R73.03 PREDIABETES: ICD-10-CM

## 2018-06-15 PROBLEM — Z96.649: Status: RESOLVED | Noted: 2018-02-21 | Resolved: 2018-06-15

## 2018-06-15 PROBLEM — T84.098A: Status: RESOLVED | Noted: 2018-02-21 | Resolved: 2018-06-15

## 2018-06-15 PROCEDURE — G0439 PPPS, SUBSEQ VISIT: HCPCS | Performed by: FAMILY MEDICINE

## 2018-06-15 ASSESSMENT — PATIENT HEALTH QUESTIONNAIRE - PHQ9: CLINICAL INTERPRETATION OF PHQ2 SCORE: 0

## 2018-06-15 ASSESSMENT — ENCOUNTER SYMPTOMS: GENERAL WELL-BEING: GOOD

## 2018-06-15 ASSESSMENT — ACTIVITIES OF DAILY LIVING (ADL): BATHING_REQUIRES_ASSISTANCE: 0

## 2018-06-15 ASSESSMENT — PAIN SCALES - GENERAL: PAINLEVEL: NO PAIN

## 2018-06-15 NOTE — ASSESSMENT & PLAN NOTE
This is a chronic health problem that the patient is able to control through diet and exercise.  She will continue with that long-term

## 2018-06-15 NOTE — ASSESSMENT & PLAN NOTE
This is a chronic health problem that is well controlled with current medications and lifestyle measures.  Patient has lost 10 lbs over the last 2 weeks.  She will continue to work on this

## 2018-06-15 NOTE — ASSESSMENT & PLAN NOTE
This is a chronic health problem that is well controlled with current medications and lifestyle measures.  Patient finds protonix to be helpful.

## 2018-06-15 NOTE — ASSESSMENT & PLAN NOTE
This was a chronic problem for this patient that was injected in 2017 and that has resolved the problem.  She has had no further pain.  We will resolve this off of her problem list.

## 2018-06-15 NOTE — ASSESSMENT & PLAN NOTE
This is a chronic health problem that is well controlled with current medications and lifestyle measures.  Patient will continue with current dose of lovastatin 40 mg.

## 2018-06-15 NOTE — ASSESSMENT & PLAN NOTE
This is a chronic health problem for this patient for which he needs to get a DEXA scan.  We have run that order off for her she will schedule the DEXA scan and we will notify her of that result.

## 2018-06-15 NOTE — PROGRESS NOTES
No chief complaint on file.        HPI:  Olesya is a 77 y.o. here for Medicare Annual Wellness Visit        Patient Active Problem List    Diagnosis Date Noted   • Gastroesophageal reflux disease without esophagitis 06/04/2018   • Menopause 06/04/2018   • Essential hypertension 05/03/2018   • Hypercholesterolemia 05/03/2018   • Prediabetes 05/03/2018   • Obesity (BMI 30-39.9) 05/03/2018   • Mechanical complication of hip prosthesis, initial encounter ([facility]) 02/21/2018       Current Outpatient Prescriptions   Medication Sig Dispense Refill   • losartan (COZAAR) 100 MG Tab Take 1 Tab by mouth every day. 90 Tab 3   • lovastatin (MEVACOR) 40 MG tablet Take 2 Tabs by mouth every evening. 180 Tab 3   • pantoprazole (PROTONIX) 40 MG Tablet Delayed Response Take 1 Tab by mouth every day. 90 Tab 3   • amLODIPine (NORVASC) 2.5 MG Tab Take 1 Tab by mouth every day. 90 Tab 3   • potassium chloride SA (KDUR) 20 MEQ Tab CR Take 1 Tab by mouth every day. 90 Tab 3   • vitamin D (CHOLECALCIFEROL) 1000 UNIT Tab Take 1,000 Units by mouth every day.     • Garlic 1000 MG Cap Take 1 Cap by mouth every day.     • acetaminophen (TYLENOL) 325 MG Tab Take 2 Tabs by mouth every 6 hours. 30 Tab 0   • docusate sodium 100 MG Cap Take 100 mg by mouth 2 Times a Day. 20 Cap 2     No current facility-administered medications for this visit.         Patient is taking medications as noted in medication list.  Current supplements as per medication list.     Allergies: Codeine    Current social contact/activities: Bring with friend, spend time with family.     Is patient current with immunizations? No, due for TDAP and SHINGRIX (Shingles). Patient is interested in receiving NONE today.    She  reports that she has never smoked. She has never used smokeless tobacco. She reports that she does not drink alcohol or use drugs.  Counseling given: Not Answered        DPA/Advanced directive: Patient does not have an Advanced Directive.  A packet and workshop  information was given on Advanced Directives.    ROS:    Gait: Uses a cane   Ostomy: no   Other tubes: no   Amputations: no   Chronic oxygen use no   Last eye exam 05/2018   Wears hearing aids: no   : Denies any urinary leakage during the last 6 months        Depression Screening    Little interest or pleasure in doing things?  0 - not at all  Feeling down, depressed, or hopeless? 0 - not at all  Patient Health Questionnaire Score: 0    If depressive symptoms identified deferred to follow up visit unless specifically addressed in assessment and plan.    Interpretation of PHQ-9 Total Score   Score Severity   1-4 No Depression   5-9 Mild Depression   10-14 Moderate Depression   15-19 Moderately Severe Depression   20-27 Severe Depression    Screening for Cognitive Impairment    Three Minute Recall (leader, season, table)  2/3 leader, season, table  Wayne clock face with all 12 numbers and set the hands to show 10 past 11.  Yes 4/5  If cognitive concerns identified, deferred for follow up unless specifically addressed in assessment and plan.    Fall Risk Assessment    Has the patient had two or more falls in the last year or any fall with injury in the last year?  No  If fall risk identified, deferred for follow up unless specifically addressed in assessment and plan.    Safety Assessment    Throw rugs on floor.  No  Handrails on all stairs.  No  Good lighting in all hallways.  Yes  Difficulty hearing.  No  Patient counseled about all safety risks that were identified.    Functional Assessment ADLs    Are there any barriers preventing you from cooking for yourself or meeting nutritional needs?  No.    Are there any barriers preventing you from driving safely or obtaining transportation?  No.    Are there any barriers preventing you from using a telephone or calling for help?  No.    Are there any barriers preventing you from shopping?  No.    Are there any barriers preventing you from taking care of your own finances?   No.    Are there any barriers preventing you from managing your medications?  No.    Are there any barriers preventing you from showering, bathing or dressing yourself?  No.    Are you currently engaging in any exercise or physical activity?  Yes.  Walking around in home.  What is your perception of your health?  Good.    Health Maintenance Summary                Annual Wellness Visit Overdue 1940     IMM DTaP/Tdap/Td Vaccine Overdue 12/17/1959     BONE DENSITY Overdue 12/17/2005     MAMMOGRAM Next Due 5/10/2019      Done 5/10/2018 MA-MAMMO SCREENING BILAT W/TOMOSYNTHESIS W/CAD     Patient has more history with this topic...    COLON CANCER SCREENING ANNUAL FIT Next Due 5/26/2019      Done 5/26/2018 OCCULT BLOOD FECES IMMUNOASSAY          Patient Care Team:  Ainsley Clark M.D. as PCP - General (Family Medicine)  Dion Dennis M.D. as Consulting Physician (Orthopaedics)    Social History   Substance Use Topics   • Smoking status: Never Smoker   • Smokeless tobacco: Never Used   • Alcohol use No     Family History   Problem Relation Age of Onset   • Other Mother      cause unknown   • Kidney Disease Father      went off dialysis     She  has a past medical history of Arthritis; CATARACT; Essential hypertension (5/3/2018); High cholesterol; Hypercholesteremia; Hypercholesterolemia (5/3/2018); Hypertension; Hypokalemia (5/3/2018); Iron deficiency anemia due to chronic blood loss (5/3/2018); Menopause (6/4/2018); and Prediabetes (5/3/2018).   Past Surgical History:   Procedure Laterality Date   • HIP REVISION TOTAL Right 2/20/2018    Procedure: HIP REVISION TOTAL;  Surgeon: Dion Dennis M.D.;  Location: SURGERY [facility];  Service: Orthopedics   • JOINT INJECTION DIAGNOSTIC Right 1/16/2018    Procedure: JOINT INJECTION DIAGNOSTIC - FOR HIP ASPIRATION;  Surgeon: Dion Dennis M.D.;  Location: SURGERY [facility];  Service: Orthopedics   • CATARACT PHACO WITH IOL  5/9/2013    Performed by  "Haja Toussaint M.D. at [facility]   • HIP ARTHROPLASTY TOTAL  11/21/2011    Performed by ANH HIRSCH at SURGERY [facility]S   • CARPAL TUNNEL REL     • OTHER ORTHOPEDIC SURGERY  2009?    right knee replacement, Sylvie           Exam:     Blood pressure 126/62, pulse 76, temperature 36.7 °C (98 °F), resp. rate 16, height 1.727 m (5' 8\"), weight 89.1 kg (196 lb 6.9 oz), SpO2 98 %. Body mass index is 29.87 kg/m².    Hearing poor.    Dentition good  Alert, oriented in no acute distress.  Eye contact is good, speech goal directed, affect calm      Assessment and Plan. The following treatment and monitoring plan is recommended:    Essential hypertension  This is a chronic health problem that is well controlled with current medications and lifestyle measures. The patient denies chest pain, shortness of breath or dyspnea on exertion.      Gastroesophageal reflux disease without esophagitis  This is a chronic health problem that is well controlled with current medications and lifestyle measures.  Patient finds protonix to be helpful.    Hypercholesterolemia  This is a chronic health problem that is well controlled with current medications and lifestyle measures.  Patient will continue with current dose of lovastatin 40 mg.    Sacroiliitis, not elsewhere classified (HCC)  This was a chronic problem for this patient that was injected in 2017 and that has resolved the problem.  She has had no further pain.  We will resolve this off of her problem list.    Prediabetes  This is a chronic health problem that the patient is able to control through diet and exercise.  She will continue with that long-term    Obesity (BMI 30-39.9)  This is a chronic health problem that is well controlled with current medications and lifestyle measures.  Patient has lost 10 lbs over the last 2 weeks.  She will continue to work on this    Menopause  This is a chronic health problem for this patient for which he needs to get " a DEXA scan.  We have run that order off for her she will schedule the DEXA scan and we will notify her of that result.        Services suggested: No services needed at this time  Health Care Screening recommendations as per orders if indicated.  Referrals offered: PT/OT/Nutrition counseling/Behavioral Health/Smoking cessation as per orders if indicated.    Discussion today about general wellness and lifestyle habits:    · Prevent falls and reduce trip hazards; Cautioned about securing or removing rugs.  · Have a working fire alarm and carbon monoxide detector;   · Engage in regular physical activity and social activities       Follow-up: 3 months

## 2018-06-22 NOTE — PROGRESS NOTES
This patient was seen for her annual wellness visit on 6/15/18.  I wrote my note with the assistance of the medical assistant/health  Bing.  I neglected to change the author of the note despite my being the provider that provided healthcare to this patient and completed her annual wellness visit.  The health  asked the screening questions regarding dementia, activities of daily living and depression as well as fall risk.

## 2018-07-11 ENCOUNTER — HOSPITAL ENCOUNTER (OUTPATIENT)
Dept: RADIOLOGY | Facility: MEDICAL CENTER | Age: 78
End: 2018-07-11
Attending: FAMILY MEDICINE
Payer: MEDICARE

## 2018-07-11 PROCEDURE — 77080 DXA BONE DENSITY AXIAL: CPT

## 2018-10-08 ENCOUNTER — OFFICE VISIT (OUTPATIENT)
Dept: MEDICAL GROUP | Facility: MEDICAL CENTER | Age: 78
End: 2018-10-08
Payer: MEDICARE

## 2018-10-08 VITALS
RESPIRATION RATE: 14 BRPM | SYSTOLIC BLOOD PRESSURE: 122 MMHG | HEART RATE: 76 BPM | OXYGEN SATURATION: 96 % | WEIGHT: 205 LBS | HEIGHT: 68 IN | TEMPERATURE: 98.1 F | DIASTOLIC BLOOD PRESSURE: 62 MMHG | BODY MASS INDEX: 31.07 KG/M2

## 2018-10-08 DIAGNOSIS — R01.1 HEART MURMUR: ICD-10-CM

## 2018-10-08 DIAGNOSIS — I10 ESSENTIAL HYPERTENSION: ICD-10-CM

## 2018-10-08 DIAGNOSIS — E66.9 OBESITY (BMI 30-39.9): ICD-10-CM

## 2018-10-08 DIAGNOSIS — M16.12 ARTHRITIS OF LEFT HIP: ICD-10-CM

## 2018-10-08 DIAGNOSIS — Z23 NEEDS FLU SHOT: ICD-10-CM

## 2018-10-08 PROCEDURE — 99214 OFFICE O/P EST MOD 30 MIN: CPT | Mod: 25 | Performed by: FAMILY MEDICINE

## 2018-10-08 PROCEDURE — 90662 IIV NO PRSV INCREASED AG IM: CPT | Performed by: FAMILY MEDICINE

## 2018-10-08 PROCEDURE — G0008 ADMIN INFLUENZA VIRUS VAC: HCPCS | Performed by: FAMILY MEDICINE

## 2018-10-08 NOTE — ASSESSMENT & PLAN NOTE
This is a chronic health problem for this patient for which she is on amlodipine 2.5 mg daily as well as losartan 100 mg daily.  She has been having bilateral ankle swelling.  Her granddaughter who accompanies her says that she is not sleeping in her bed because of her left hip pain that many times she falls asleep in her chair so she may not be getting her feet higher than her hips allowing her ankles to drain.

## 2018-10-08 NOTE — ASSESSMENT & PLAN NOTE
This patient was seen at the [facility] on 10/8/18 by Dr. Dion James who felt that the patient needs a left hip total arthroplasty.  The patient was complaining that she has bilateral ankle edema and therefore the orthopedist wanted her checked out to make sure she did not have any heart or kidney problems.  I talked with the patient and I think her edema is coming from the fact that she is sleeping in her chair not getting her feet up higher than her hips as well as the fact she is on amlodipine 2.5 mg daily which can cause mild ankle edema.  There is no pitting in her edema.  We will do a workup for heart murmur that was found today.  She will also get some baseline blood work done.

## 2018-10-08 NOTE — PROGRESS NOTES
CC:Diagnoses of Needs flu shot, Essential hypertension, Heart murmur, Arthritis of left hip, and Obesity (BMI 30-39.9) were pertinent to this visit.    HISTORY OF PRESENT ILLNESS: Patient is a 77 y.o. female established patient who presents today to discuss her mild ankle edema that she had when she was seen by the orthopedist on 10/8/18.  He wants to be certain that she does not have a heart problem or kidney problem causing this.  On exam today her lungs are clear to auscultation and percussion bilaterally but she does have a soft holosystolic murmur that I have not heard previously.  I suspect she is developing aortic stenosis.  We will get a workup and then see her back to go over results.    Health Maintenance: Completed    Essential hypertension  This is a chronic health problem for this patient for which she is on amlodipine 2.5 mg daily as well as losartan 100 mg daily.  She has been having bilateral ankle swelling.  Her granddaughter who accompanies her says that she is not sleeping in her bed because of her left hip pain that many times she falls asleep in her chair so she may not be getting her feet higher than her hips allowing her ankles to drain.    Heart murmur  This is a new finding on patient's exam today.  She has a soft holosystolic murmur which would be consistent with aortic stenosis.  She is never been told that she had a heart murmur before.  She has no history of shortness of breath, dyspnea on exertion or chest pain with activity.  She is trying to get ready to have a left total hip replacement.  We will get an echocardiogram and take a look at her heart which I believe is absolutely strong enough for her to proceed with surgery.    Arthritis of left hip  This patient was seen at the [place] [facility] on 10/8/18 by Dr. Dion James who felt that the patient needs a left hip total arthroplasty.  The patient was complaining that she has bilateral ankle edema and therefore the orthopedist  wanted her checked out to make sure she did not have any heart or kidney problems.  I talked with the patient and I think her edema is coming from the fact that she is sleeping in her chair not getting her feet up higher than her hips as well as the fact she is on amlodipine 2.5 mg daily which can cause mild ankle edema.  There is no pitting in her edema.  We will do a workup for heart murmur that was found today.  She will also get some baseline blood work done.    Obesity (BMI 30-39.9)  This is a chronic health problem for this patient that she is just trying to maintain her weight.  She is not working on weight loss currently.      Patient Active Problem List    Diagnosis Date Noted   • Heart murmur 10/08/2018   • Arthritis of left hip 10/08/2018   • Gastroesophageal reflux disease without esophagitis 06/04/2018   • Menopause 06/04/2018   • Essential hypertension 05/03/2018   • Hypercholesterolemia 05/03/2018   • Prediabetes 05/03/2018   • Obesity (BMI 30-39.9) 05/03/2018      Allergies:Codeine    Current Outpatient Prescriptions   Medication Sig Dispense Refill   • losartan (COZAAR) 100 MG Tab Take 1 Tab by mouth every day. 90 Tab 3   • lovastatin (MEVACOR) 40 MG tablet Take 2 Tabs by mouth every evening. 180 Tab 3   • pantoprazole (PROTONIX) 40 MG Tablet Delayed Response Take 1 Tab by mouth every day. 90 Tab 3   • amLODIPine (NORVASC) 2.5 MG Tab Take 1 Tab by mouth every day. 90 Tab 3   • potassium chloride SA (KDUR) 20 MEQ Tab CR Take 1 Tab by mouth every day. 90 Tab 3   • vitamin D (CHOLECALCIFEROL) 1000 UNIT Tab Take 1,000 Units by mouth every day.     • Garlic 1000 MG Cap Take 1 Cap by mouth every day.       No current facility-administered medications for this visit.        Social History   Substance Use Topics   • Smoking status: Never Smoker   • Smokeless tobacco: Never Used   • Alcohol use No     Social History     Social History Narrative   • No narrative on file       Family History   Problem Relation  "Age of Onset   • Other Mother         cause unknown   • Kidney Disease Father         went off dialysis        ROS:     - Constitutional:  Negative for fever, chills, unexpected weight change, and fatigue/generalized weakness.    - HEENT:  Negative for headaches, vision changes, hearing changes, ear pain, ear discharge, rhinorrhea, sinus congestion, sore throat, and neck pain.      - Respiratory: Negative for cough, sputum production, chest congestion, dyspnea, wheezing, and crackles.      - Cardiovascular: Negative for chest pain, palpitations, orthopnea.     - Gastrointestinal: Negative for heartburn, nausea, vomiting, abdominal pain, hematochezia, melena, diarrhea, constipation, and greasy/foul-smelling stools.     - Genitourinary: Negative for dysuria, polyuria, hematuria, pyuria, urinary urgency, and urinary incontinence.     - Musculoskeletal: Negative for myalgias, back pain, and joint pain.     - Skin: Negative for rash, itching, cyanotic skin color change.     - Neurological: Negative for dizziness, tingling, tremors, focal sensory deficit, focal weakness and headaches.     - Endo/Heme/Allergies: Does not bruise/bleed easily.     - Psychiatric/Behavioral: Negative for depression, suicidal/homicidal ideation and memory loss.          - NOTE: All other systems reviewed and are negative, except as in HPI.      Exam:    Blood pressure 122/62, pulse 76, temperature 36.7 °C (98.1 °F), resp. rate 14, height 1.727 m (5' 8\"), weight 93 kg (205 lb), SpO2 96 %, not currently breastfeeding. Body mass index is 31.17 kg/m².    General:  Well nourished, well developed female in NAD  Head is grossly normal.  Neck: Supple without JVD or bruit. Thyroid is not enlarged.  Pulmonary: Clear to ausculation and percussion.  Normal effort. No rales, ronchi, or wheezing.  Cardiovascular: Regular rate and rhythm with 2/6 holosystolic murmur. Carotid and radial pulses are intact and equal bilaterally.  Extremities: 1+ nonpitting edema " to the ankles bilaterally, no clubbing, cyanosis.    Please note that this dictation was created using voice recognition software. I have made every reasonable attempt to correct obvious errors, but I expect that there are errors of grammar and possibly content that I did not discover before finalizing the note.    Assessment/Plan:  1. Needs flu shot  Given today  - INFLUENZA VACCINE, HIGH DOSE (65+ ONLY)    2. Essential hypertension  Adequately controlled with current medications.  There is every chance that the patient's mild edema is due to her amlodipine.  We will check a chest x-ray to make sure she does not have any effusions and we will get baseline blood work.  - DX-CHEST-2 VIEWS; Future  - COMP METABOLIC PANEL; Future  - CBC WITH DIFFERENTIAL; Future    3. Heart murmur  Uncontrolled, we will get an echocardiogram to see if she needs to have her systolic murmur addressed  - EC-ECHOCARDIOGRAM COMPLETE W/O CONT; Future    4. Arthritis of left hip  Uncontrolled, patient would like to proceed with left hip total arthroplasty as soon as she can be cleared for surgery.    5. Obesity (BMI 30-39.9)  Uncontrolled, patient is trying to maintain her weight not working on weight loss.  - Patient identified as having weight management issue.  Appropriate orders and counseling given.

## 2018-10-08 NOTE — ASSESSMENT & PLAN NOTE
This is a new finding on patient's exam today.  She has a soft holosystolic murmur which would be consistent with aortic stenosis.  She is never been told that she had a heart murmur before.  She has no history of shortness of breath, dyspnea on exertion or chest pain with activity.  She is trying to get ready to have a left total hip replacement.  We will get an echocardiogram and take a look at her heart which I believe is absolutely strong enough for her to proceed with surgery.

## 2018-10-08 NOTE — ASSESSMENT & PLAN NOTE
This is a chronic health problem for this patient that she is just trying to maintain her weight.  She is not working on weight loss currently.

## 2018-10-10 ENCOUNTER — HOSPITAL ENCOUNTER (OUTPATIENT)
Dept: LAB | Facility: MEDICAL CENTER | Age: 78
End: 2018-10-10
Attending: FAMILY MEDICINE
Payer: MEDICARE

## 2018-10-10 ENCOUNTER — HOSPITAL ENCOUNTER (OUTPATIENT)
Dept: RADIOLOGY | Facility: MEDICAL CENTER | Age: 78
End: 2018-10-10
Attending: FAMILY MEDICINE
Payer: MEDICARE

## 2018-10-10 DIAGNOSIS — I10 ESSENTIAL HYPERTENSION: ICD-10-CM

## 2018-10-10 LAB
ALBUMIN SERPL BCP-MCNC: 4.1 G/DL (ref 3.2–4.9)
ALBUMIN/GLOB SERPL: 1.6 G/DL
ALP SERPL-CCNC: 84 U/L (ref 30–99)
ALT SERPL-CCNC: 8 U/L (ref 2–50)
ANION GAP SERPL CALC-SCNC: 9 MMOL/L (ref 0–11.9)
AST SERPL-CCNC: 18 U/L (ref 12–45)
BASOPHILS # BLD AUTO: 1.3 % (ref 0–1.8)
BASOPHILS # BLD: 0.06 K/UL (ref 0–0.12)
BILIRUB SERPL-MCNC: 1 MG/DL (ref 0.1–1.5)
BUN SERPL-MCNC: 19 MG/DL (ref 8–22)
CALCIUM SERPL-MCNC: 10 MG/DL (ref 8.5–10.5)
CHLORIDE SERPL-SCNC: 107 MMOL/L (ref 96–112)
CO2 SERPL-SCNC: 26 MMOL/L (ref 20–33)
CREAT SERPL-MCNC: 0.92 MG/DL (ref 0.5–1.4)
EOSINOPHIL # BLD AUTO: 0.09 K/UL (ref 0–0.51)
EOSINOPHIL NFR BLD: 1.9 % (ref 0–6.9)
ERYTHROCYTE [DISTWIDTH] IN BLOOD BY AUTOMATED COUNT: 42.1 FL (ref 35.9–50)
FASTING STATUS PATIENT QL REPORTED: NORMAL
GLOBULIN SER CALC-MCNC: 2.6 G/DL (ref 1.9–3.5)
GLUCOSE SERPL-MCNC: 88 MG/DL (ref 65–99)
HCT VFR BLD AUTO: 41.6 % (ref 37–47)
HGB BLD-MCNC: 13.6 G/DL (ref 12–16)
IMM GRANULOCYTES # BLD AUTO: 0.01 K/UL (ref 0–0.11)
IMM GRANULOCYTES NFR BLD AUTO: 0.2 % (ref 0–0.9)
LYMPHOCYTES # BLD AUTO: 1.17 K/UL (ref 1–4.8)
LYMPHOCYTES NFR BLD: 25.1 % (ref 22–41)
MCH RBC QN AUTO: 29.7 PG (ref 27–33)
MCHC RBC AUTO-ENTMCNC: 32.7 G/DL (ref 33.6–35)
MCV RBC AUTO: 90.8 FL (ref 81.4–97.8)
MONOCYTES # BLD AUTO: 0.47 K/UL (ref 0–0.85)
MONOCYTES NFR BLD AUTO: 10.1 % (ref 0–13.4)
NEUTROPHILS # BLD AUTO: 2.87 K/UL (ref 2–7.15)
NEUTROPHILS NFR BLD: 61.4 % (ref 44–72)
NRBC # BLD AUTO: 0 K/UL
NRBC BLD-RTO: 0 /100 WBC
PLATELET # BLD AUTO: 272 K/UL (ref 164–446)
PMV BLD AUTO: 9.5 FL (ref 9–12.9)
POTASSIUM SERPL-SCNC: 3.8 MMOL/L (ref 3.6–5.5)
PROT SERPL-MCNC: 6.7 G/DL (ref 6–8.2)
RBC # BLD AUTO: 4.58 M/UL (ref 4.2–5.4)
SODIUM SERPL-SCNC: 142 MMOL/L (ref 135–145)
WBC # BLD AUTO: 4.7 K/UL (ref 4.8–10.8)

## 2018-10-10 PROCEDURE — 36415 COLL VENOUS BLD VENIPUNCTURE: CPT

## 2018-10-10 PROCEDURE — 71046 X-RAY EXAM CHEST 2 VIEWS: CPT

## 2018-10-10 PROCEDURE — 80053 COMPREHEN METABOLIC PANEL: CPT

## 2018-10-10 PROCEDURE — 85025 COMPLETE CBC W/AUTO DIFF WBC: CPT

## 2018-10-18 ENCOUNTER — HOSPITAL ENCOUNTER (OUTPATIENT)
Dept: CARDIOLOGY | Facility: MEDICAL CENTER | Age: 78
End: 2018-10-18
Attending: FAMILY MEDICINE
Payer: MEDICARE

## 2018-10-18 DIAGNOSIS — R01.1 HEART MURMUR: ICD-10-CM

## 2018-10-18 LAB
LV EJECT FRACT  99904: 65
LV EJECT FRACT MOD 2C 99903: 74.64
LV EJECT FRACT MOD 4C 99902: 64.91
LV EJECT FRACT MOD BP 99901: 69.73

## 2018-10-18 PROCEDURE — 93306 TTE W/DOPPLER COMPLETE: CPT

## 2018-10-18 PROCEDURE — 93306 TTE W/DOPPLER COMPLETE: CPT | Mod: 26 | Performed by: INTERNAL MEDICINE

## 2018-10-25 ENCOUNTER — TELEPHONE (OUTPATIENT)
Dept: MEDICAL GROUP | Facility: MEDICAL CENTER | Age: 78
End: 2018-10-25

## 2018-10-25 NOTE — TELEPHONE ENCOUNTER
ESTABLISHED PATIENT PRE-VISIT PLANNING     Note: Patient will not be contacted if there is no indication to call.     1.  Reviewed notes from the last few office visits within the medical group: Yes    2.  If any orders were placed at last visit or intended to be done for this visit (i.e. 6 mos follow-up), do we have Results/Consult Notes?        •  Labs - Labs ordered, completed on 10/10/2018 and results are in chart.   Note: If patient appointment is for lab review and patient did not complete labs, check with provider if OK to reschedule patient until labs completed.       •  Imaging - Imaging ordered, completed and results are in chart.       •  Referrals - No referrals were ordered at last office visit.    3. Is this appointment scheduled as a Hospital Follow-Up? No    4.  Immunizations were updated in Apptive using WebIZ?: Yes       •  Web Iz Recommendations: TDAP and ZOSTAVAX (Shingles)    5.  Patient is due for the following Health Maintenance Topics:   Health Maintenance Due   Topic Date Due   • Annual Wellness Visit  1940   • IMM DTaP/Tdap/Td Vaccine (1 - Tdap) 12/17/1959   • IMM ZOSTER VACCINES (1 of 2) 12/17/1990       6.  MDX printed for Provider? YES    7.  Patient was NOT informed to arrive 15 min prior to their scheduled appointment and bring in their medication bottles.

## 2018-11-01 ENCOUNTER — OFFICE VISIT (OUTPATIENT)
Dept: MEDICAL GROUP | Facility: MEDICAL CENTER | Age: 78
End: 2018-11-01
Payer: MEDICARE

## 2018-11-01 ENCOUNTER — APPOINTMENT (OUTPATIENT)
Dept: MEDICAL GROUP | Facility: MEDICAL CENTER | Age: 78
End: 2018-11-01
Payer: MEDICARE

## 2018-11-01 VITALS
HEART RATE: 86 BPM | HEIGHT: 68 IN | WEIGHT: 195.11 LBS | BODY MASS INDEX: 29.57 KG/M2 | TEMPERATURE: 97.9 F | SYSTOLIC BLOOD PRESSURE: 140 MMHG | RESPIRATION RATE: 16 BRPM | OXYGEN SATURATION: 97 % | DIASTOLIC BLOOD PRESSURE: 64 MMHG

## 2018-11-01 DIAGNOSIS — I35.1 NONRHEUMATIC AORTIC VALVE INSUFFICIENCY: ICD-10-CM

## 2018-11-01 DIAGNOSIS — I10 ESSENTIAL HYPERTENSION: ICD-10-CM

## 2018-11-01 DIAGNOSIS — M16.12 ARTHRITIS OF LEFT HIP: ICD-10-CM

## 2018-11-01 PROCEDURE — 99214 OFFICE O/P EST MOD 30 MIN: CPT | Performed by: FAMILY MEDICINE

## 2018-11-01 NOTE — ASSESSMENT & PLAN NOTE
This is a chronic health problem for this patient well-controlled.  Her previous numbers show that she typically runs in the 120s/60s.  Today is slightly higher 140/64 but she was afraid she was late and she was rushing to get here.  I expect that she would do very well with surgery.  Her heart is normal and her kidney function is normal.

## 2018-11-01 NOTE — LETTER
November 1, 2018        Olesya Harmon  [address] [place] [address]        Dear Dr. Dennis:  I saw our mutual patient Olesya today in the office to follow-up on lab work that she had done as well as an echocardiogram in preparation for her proceeding with a left hip total arthroplasty.  I have enclosed a copy of the note.  She has done very well.  Her electrolytes are absolutely normal her GFR is 59 and her CBC shows no anemia or abnormalities other than a slightly low white count at 4.7.  I also had her get an echocardiogram because of a heart murmur and concerns about ankle edema.  I believe this patient has ankle edema because she has to sit so much because her left hip is so painful.  Her echocardiogram is completely normal except for trace aortic insufficiency and trace tricuspid insufficiency but an ejection fraction of 65%.  She should do well with surgery.  She is extremely motivated and hopeful that she will have a good result.    If you have any questions or concerns, please don't hesitate to call.        Sincerely,        Ainsley Clark,

## 2018-11-01 NOTE — ASSESSMENT & PLAN NOTE
This is a chronic problem for this patient for which she saw Dr. Dion Dennis and she is looking at proceeding with a left total hip arthroplasty.  We needed to do blood work and make certain that her heart and kidneys are strong and can tolerate the surgery.  Her blood work is absolutely fabulous.  Her comprehensive metabolic panel is normal her GFR is 59 and her CBC shows no anemia and only a minor abnormality in her white blood cell count.  Patient is really in excellent shape other than having a bad left hip.  I think she would be an excellent candidate for surgery.  I will write a letter to that effect today.

## 2018-11-01 NOTE — ASSESSMENT & PLAN NOTE
This patient is looking at having a surgery in the next couple of months hopefully soon to repair her left hip.  She had a mild heart murmur when we last saw her we had her get an echocardiogram.  It comes back showing trace aortic insufficiency.  Her ejection fraction is 65%.  She should do well with surgery and rehab.  I see no reason that she could not proceed.

## 2018-11-01 NOTE — PROGRESS NOTES
CC:Diagnoses of Arthritis of left hip, Essential hypertension, and Nonrheumatic aortic valve insufficiency were pertinent to this visit.    HISTORY OF PRESENT ILLNESS: Patient is a 77 y.o. female established patient who presents today to follow-up after having blood work and an echocardiogram done in preparation to have a surgery for a left total hip arthroplasty with Dr. Dennis.    Health Maintenance: Completed    Arthritis of left hip  This is a chronic problem for this patient for which she saw Dr. Dion Dennis and she is looking at proceeding with a left total hip arthroplasty.  We needed to do blood work and make certain that her heart and kidneys are strong and can tolerate the surgery.  Her blood work is absolutely fabulous.  Her comprehensive metabolic panel is normal her GFR is 59 and her CBC shows no anemia and only a minor abnormality in her white blood cell count.  Patient is really in excellent shape other than having a bad left hip.  I think she would be an excellent candidate for surgery.  I will write a letter to that effect today.    Essential hypertension  This is a chronic health problem for this patient well-controlled.  Her previous numbers show that she typically runs in the 120s/60s.  Today is slightly higher 140/64 but she was afraid she was late and she was rushing to get here.  I expect that she would do very well with surgery.  Her heart is normal and her kidney function is normal.    Nonrheumatic aortic valve insufficiency  This patient is looking at having a surgery in the next couple of months hopefully soon to repair her left hip.  She had a mild heart murmur when we last saw her we had her get an echocardiogram.  It comes back showing trace aortic insufficiency.  Her ejection fraction is 65%.  She should do well with surgery and rehab.  I see no reason that she could not proceed.      Patient Active Problem List    Diagnosis Date Noted   • Nonrheumatic aortic valve insufficiency  10/08/2018   • Arthritis of left hip 10/08/2018   • Gastroesophageal reflux disease without esophagitis 06/04/2018   • Menopause 06/04/2018   • Essential hypertension 05/03/2018   • Hypercholesterolemia 05/03/2018   • Prediabetes 05/03/2018   • Obesity (BMI 30-39.9) 05/03/2018      Allergies:Codeine    Current Outpatient Prescriptions   Medication Sig Dispense Refill   • losartan (COZAAR) 100 MG Tab Take 1 Tab by mouth every day. 90 Tab 3   • lovastatin (MEVACOR) 40 MG tablet Take 2 Tabs by mouth every evening. 180 Tab 3   • pantoprazole (PROTONIX) 40 MG Tablet Delayed Response Take 1 Tab by mouth every day. 90 Tab 3   • amLODIPine (NORVASC) 2.5 MG Tab Take 1 Tab by mouth every day. 90 Tab 3   • potassium chloride SA (KDUR) 20 MEQ Tab CR Take 1 Tab by mouth every day. 90 Tab 3   • vitamin D (CHOLECALCIFEROL) 1000 UNIT Tab Take 1,000 Units by mouth every day.     • Garlic 1000 MG Cap Take 1 Cap by mouth every day.       No current facility-administered medications for this visit.        Social History   Substance Use Topics   • Smoking status: Never Smoker   • Smokeless tobacco: Never Used   • Alcohol use No     Social History     Social History Narrative   • No narrative on file       Family History   Problem Relation Age of Onset   • Other Mother         cause unknown   • Kidney Disease Father         went off dialysis        ROS:     - Constitutional:  Negative for fever, chills, unexpected weight change, and fatigue/generalized weakness.    - HEENT:  Negative for headaches, vision changes, hearing changes, ear pain, ear discharge, rhinorrhea, sinus congestion, sore throat, and neck pain.      - Respiratory: Negative for cough, sputum production, chest congestion, dyspnea, wheezing, and crackles.      - Cardiovascular: Negative for chest pain, palpitations, orthopnea, and bilateral lower extremity edema.     - Gastrointestinal: Negative for heartburn, nausea, vomiting, abdominal pain, hematochezia, melena,  "diarrhea, constipation, and greasy/foul-smelling stools.     - Genitourinary: Negative for dysuria, polyuria, hematuria, pyuria, urinary urgency, and urinary incontinence.     - Musculoskeletal: Positive for left hip pain and weakness in the left leg secondary to the hip problem.    - Skin: Negative for rash, itching, cyanotic skin color change.     - Neurological: Negative for dizziness, tingling, tremors, focal sensory deficit, focal weakness and headaches.     - Endo/Heme/Allergies: Does not bruise/bleed easily.     - Psychiatric/Behavioral: Negative for depression, suicidal/homicidal ideation and memory loss.          - NOTE: All other systems reviewed and are negative, except as in HPI.      Exam:    Blood pressure 140/64, pulse 86, temperature 36.6 °C (97.9 °F), temperature source Temporal, resp. rate 16, height 1.727 m (5' 8\"), weight 88.5 kg (195 lb 1.7 oz), SpO2 97 %, not currently breastfeeding. Body mass index is 29.67 kg/m².    General:  Well nourished, well developed female in NAD  LABS: 10/10/18: Results reviewed and discussed with the patient, questions answered.    Echocardiogram: 10/18/19: Reviewed and questions answered.  Trace aortic insufficiency, trace tricuspid insufficiency, ejection fraction 65%.  Please note that this dictation was created using voice recognition software. I have made every reasonable attempt to correct obvious errors, but I expect that there are errors of grammar and possibly content that I did not discover before finalizing the note.    Assessment/Plan:  1. Arthritis of left hip  Uncontrolled, patient ready to proceed with total arthroplasty of the left hip.  We will give her a copy of this note so she can take it to her surgeon.    2. Essential hypertension  Controlled, continue with current meds and lifestyle.      3. Nonrheumatic aortic valve insufficiency  This is a minor abnormality that will not cause her any problems with her surgery.         "

## 2019-01-16 DIAGNOSIS — Z01.810 PRE-OPERATIVE CARDIOVASCULAR EXAMINATION: ICD-10-CM

## 2019-01-16 DIAGNOSIS — Z01.812 PRE-PROCEDURAL LABORATORY EXAMINATION: ICD-10-CM

## 2019-01-16 LAB
ANION GAP SERPL CALC-SCNC: 8 MMOL/L (ref 0–11.9)
BASOPHILS # BLD AUTO: 1.2 % (ref 0–1.8)
BASOPHILS # BLD: 0.07 K/UL (ref 0–0.12)
BUN SERPL-MCNC: 19 MG/DL (ref 8–22)
CALCIUM SERPL-MCNC: 9.9 MG/DL (ref 8.5–10.5)
CHLORIDE SERPL-SCNC: 107 MMOL/L (ref 96–112)
CO2 SERPL-SCNC: 27 MMOL/L (ref 20–33)
CREAT SERPL-MCNC: 0.92 MG/DL (ref 0.5–1.4)
EKG IMPRESSION: NORMAL
EOSINOPHIL # BLD AUTO: 0.18 K/UL (ref 0–0.51)
EOSINOPHIL NFR BLD: 3.1 % (ref 0–6.9)
ERYTHROCYTE [DISTWIDTH] IN BLOOD BY AUTOMATED COUNT: 41.3 FL (ref 35.9–50)
GLUCOSE SERPL-MCNC: 117 MG/DL (ref 65–99)
HCT VFR BLD AUTO: 40.2 % (ref 37–47)
HGB BLD-MCNC: 13.4 G/DL (ref 12–16)
IMM GRANULOCYTES # BLD AUTO: 0.02 K/UL (ref 0–0.11)
IMM GRANULOCYTES NFR BLD AUTO: 0.3 % (ref 0–0.9)
LYMPHOCYTES # BLD AUTO: 1.17 K/UL (ref 1–4.8)
LYMPHOCYTES NFR BLD: 20.1 % (ref 22–41)
MCH RBC QN AUTO: 29.6 PG (ref 27–33)
MCHC RBC AUTO-ENTMCNC: 33.3 G/DL (ref 33.6–35)
MCV RBC AUTO: 88.7 FL (ref 81.4–97.8)
MONOCYTES # BLD AUTO: 0.52 K/UL (ref 0–0.85)
MONOCYTES NFR BLD AUTO: 8.9 % (ref 0–13.4)
NEUTROPHILS # BLD AUTO: 3.86 K/UL (ref 2–7.15)
NEUTROPHILS NFR BLD: 66.4 % (ref 44–72)
NRBC # BLD AUTO: 0 K/UL
NRBC BLD-RTO: 0 /100 WBC
PLATELET # BLD AUTO: 304 K/UL (ref 164–446)
PMV BLD AUTO: 9.3 FL (ref 9–12.9)
POTASSIUM SERPL-SCNC: 3.8 MMOL/L (ref 3.6–5.5)
RBC # BLD AUTO: 4.53 M/UL (ref 4.2–5.4)
SCCMEC + MECA PNL NOSE NAA+PROBE: NEGATIVE
SCCMEC + MECA PNL NOSE NAA+PROBE: NEGATIVE
SODIUM SERPL-SCNC: 142 MMOL/L (ref 135–145)
WBC # BLD AUTO: 5.8 K/UL (ref 4.8–10.8)

## 2019-01-16 PROCEDURE — 93010 ELECTROCARDIOGRAM REPORT: CPT | Performed by: INTERNAL MEDICINE

## 2019-01-16 PROCEDURE — 87640 STAPH A DNA AMP PROBE: CPT

## 2019-01-16 PROCEDURE — 36415 COLL VENOUS BLD VENIPUNCTURE: CPT

## 2019-01-16 PROCEDURE — 85025 COMPLETE CBC W/AUTO DIFF WBC: CPT

## 2019-01-16 PROCEDURE — 87641 MR-STAPH DNA AMP PROBE: CPT

## 2019-01-16 PROCEDURE — 93005 ELECTROCARDIOGRAM TRACING: CPT

## 2019-01-16 PROCEDURE — 80048 BASIC METABOLIC PNL TOTAL CA: CPT

## 2019-01-16 NOTE — OR NURSING
During pre admit appointment this morning patient denied any symptoms of burning with urination or urinary frequency, UA not indicated.  Patient verbalized an understanding that Dr. Dennis's office will notify her if the nasal swab done during pre admit appointment today is positive.

## 2019-01-16 NOTE — DISCHARGE PLANNING
DISCHARGE PLANNING NOTE - TOTAL JOINT     Procedure: Procedure(s):  HIP ARTHROPLASTY TOTAL  Procedure Date: 2/7/2019  Insurance:  Payor: [employer] / Plan: [employer]   Equipment currently available at home? cane, front-wheel walker, raised toilet seat and shower chair  Steps into the home? 2 with ramp  Steps within the home? 0  Toilet height? Standard  Type of shower? walk-in shower with grab bars  Who will be with you during your recovery? Son lives with patient, daughter will come from [place] to assist  Is Outpatient Physical Therapy set up after surgery? No   Did you take the Total Joint Class and where? No, class information provided to patient by the pre-admission nurse. Previous right [place] with revision in 2018     Plan: Patient declined to meet with this CM. There are no identified discharge needs. Anticipate discharge home.

## 2019-01-25 ENCOUNTER — PATIENT OUTREACH (OUTPATIENT)
Dept: HEALTH INFORMATION MANAGEMENT | Facility: OTHER | Age: 79
End: 2019-01-25

## 2019-01-25 NOTE — PROGRESS NOTES
Pt stated that she is having hip surgery soon and will need to call us back to schedule in may or June , or as soon as she knows her schedule.

## 2019-02-07 ENCOUNTER — HOSPITAL ENCOUNTER (INPATIENT)
Facility: MEDICAL CENTER | Age: 79
LOS: 4 days | DRG: 470 | End: 2019-02-11
Attending: ORTHOPAEDIC SURGERY | Admitting: ORTHOPAEDIC SURGERY
Payer: MEDICARE

## 2019-02-07 ENCOUNTER — APPOINTMENT (OUTPATIENT)
Dept: RADIOLOGY | Facility: MEDICAL CENTER | Age: 79
DRG: 470 | End: 2019-02-07
Attending: ORTHOPAEDIC SURGERY
Payer: MEDICARE

## 2019-02-07 DIAGNOSIS — Z96.642 STATUS POST LEFT HIP REPLACEMENT: ICD-10-CM

## 2019-02-07 DIAGNOSIS — G89.18 POSTOPERATIVE PAIN: ICD-10-CM

## 2019-02-07 PROCEDURE — 51798 US URINE CAPACITY MEASURE: CPT

## 2019-02-07 PROCEDURE — 502000 HCHG MISC OR IMPLANTS RC 0278: Performed by: ORTHOPAEDIC SURGERY

## 2019-02-07 PROCEDURE — 700111 HCHG RX REV CODE 636 W/ 250 OVERRIDE (IP)

## 2019-02-07 PROCEDURE — 72170 X-RAY EXAM OF PELVIS: CPT

## 2019-02-07 PROCEDURE — A9270 NON-COVERED ITEM OR SERVICE: HCPCS | Performed by: ORTHOPAEDIC SURGERY

## 2019-02-07 PROCEDURE — 700102 HCHG RX REV CODE 250 W/ 637 OVERRIDE(OP): Performed by: ANESTHESIOLOGY

## 2019-02-07 PROCEDURE — 700101 HCHG RX REV CODE 250: Performed by: ORTHOPAEDIC SURGERY

## 2019-02-07 PROCEDURE — C1769 GUIDE WIRE: HCPCS | Performed by: ORTHOPAEDIC SURGERY

## 2019-02-07 PROCEDURE — 160031 HCHG SURGERY MINUTES - 1ST 30 MINS LEVEL 5: Performed by: ORTHOPAEDIC SURGERY

## 2019-02-07 PROCEDURE — 160048 HCHG OR STATISTICAL LEVEL 1-5: Performed by: ORTHOPAEDIC SURGERY

## 2019-02-07 PROCEDURE — 0SRB06A REPLACEMENT OF LEFT HIP JOINT WITH OXIDIZED ZIRCONIUM ON POLYETHYLENE SYNTHETIC SUBSTITUTE, UNCEMENTED, OPEN APPROACH: ICD-10-PCS | Performed by: ORTHOPAEDIC SURGERY

## 2019-02-07 PROCEDURE — A9270 NON-COVERED ITEM OR SERVICE: HCPCS | Performed by: ANESTHESIOLOGY

## 2019-02-07 PROCEDURE — 160035 HCHG PACU - 1ST 60 MINS PHASE I: Performed by: ORTHOPAEDIC SURGERY

## 2019-02-07 PROCEDURE — 770001 HCHG ROOM/CARE - MED/SURG/GYN PRIV*

## 2019-02-07 PROCEDURE — A6402 STERILE GAUZE <= 16 SQ IN: HCPCS | Performed by: ORTHOPAEDIC SURGERY

## 2019-02-07 PROCEDURE — 501838 HCHG SUTURE GENERAL: Performed by: ORTHOPAEDIC SURGERY

## 2019-02-07 PROCEDURE — 700105 HCHG RX REV CODE 258: Performed by: ORTHOPAEDIC SURGERY

## 2019-02-07 PROCEDURE — 160009 HCHG ANES TIME/MIN: Performed by: ORTHOPAEDIC SURGERY

## 2019-02-07 PROCEDURE — 160002 HCHG RECOVERY MINUTES (STAT): Performed by: ORTHOPAEDIC SURGERY

## 2019-02-07 PROCEDURE — 700111 HCHG RX REV CODE 636 W/ 250 OVERRIDE (IP): Performed by: ORTHOPAEDIC SURGERY

## 2019-02-07 PROCEDURE — 160042 HCHG SURGERY MINUTES - EA ADDL 1 MIN LEVEL 5: Performed by: ORTHOPAEDIC SURGERY

## 2019-02-07 PROCEDURE — 700101 HCHG RX REV CODE 250

## 2019-02-07 PROCEDURE — 700102 HCHG RX REV CODE 250 W/ 637 OVERRIDE(OP): Performed by: ORTHOPAEDIC SURGERY

## 2019-02-07 DEVICE — IMPLANTABLE DEVICE: Type: IMPLANTABLE DEVICE | Site: HIP | Status: FUNCTIONAL

## 2019-02-07 DEVICE — IMPLANT REF SPHER HEAD SCREW 20MM (1EA): Type: IMPLANTABLE DEVICE | Site: HIP | Status: FUNCTIONAL

## 2019-02-07 DEVICE — IMPLANT OXINIUM FEM HD 12/14 36 MM L/+8 (1EA): Type: IMPLANTABLE DEVICE | Site: HIP | Status: FUNCTIONAL

## 2019-02-07 DEVICE — IMPLANT REF SPHER HEAD SCREW 50MM: Type: IMPLANTABLE DEVICE | Site: HIP | Status: FUNCTIONAL

## 2019-02-07 DEVICE — IMPLANT R3 3 HOLE ACET SHELL 52MM (1EA): Type: IMPLANTABLE DEVICE | Site: HIP | Status: FUNCTIONAL

## 2019-02-07 DEVICE — IMPLANT ANTHOLOGY SO POR PL HA SZ 8: Type: IMPLANTABLE DEVICE | Site: HIP | Status: FUNCTIONAL

## 2019-02-07 DEVICE — IMPLANT R3 0 DEG XLPE ACET LNR 36MM X 52MM (1EA): Type: IMPLANTABLE DEVICE | Site: HIP | Status: FUNCTIONAL

## 2019-02-07 RX ORDER — OMEPRAZOLE 20 MG/1
20 CAPSULE, DELAYED RELEASE ORAL DAILY
Status: DISCONTINUED | OUTPATIENT
Start: 2019-02-08 | End: 2019-02-11 | Stop reason: HOSPADM

## 2019-02-07 RX ORDER — OXYCODONE HCL 5 MG/5 ML
5 SOLUTION, ORAL ORAL
Status: DISCONTINUED | OUTPATIENT
Start: 2019-02-07 | End: 2019-02-07 | Stop reason: HOSPADM

## 2019-02-07 RX ORDER — POTASSIUM CHLORIDE 20 MEQ/1
20 TABLET, EXTENDED RELEASE ORAL DAILY
Status: DISCONTINUED | OUTPATIENT
Start: 2019-02-08 | End: 2019-02-11 | Stop reason: HOSPADM

## 2019-02-07 RX ORDER — DEXTROSE MONOHYDRATE, SODIUM CHLORIDE, AND POTASSIUM CHLORIDE 50; 1.49; 4.5 G/1000ML; G/1000ML; G/1000ML
INJECTION, SOLUTION INTRAVENOUS CONTINUOUS
Status: DISPENSED | OUTPATIENT
Start: 2019-02-07 | End: 2019-02-07

## 2019-02-07 RX ORDER — MORPHINE SULFATE 0.5 MG/ML
INJECTION, SOLUTION EPIDURAL; INTRATHECAL; INTRAVENOUS
Status: DISCONTINUED | OUTPATIENT
Start: 2019-02-07 | End: 2019-02-07 | Stop reason: HOSPADM

## 2019-02-07 RX ORDER — GABAPENTIN 600 MG/1
600 TABLET ORAL ONCE
COMMUNITY
End: 2020-08-14

## 2019-02-07 RX ORDER — BISACODYL 10 MG
10 SUPPOSITORY, RECTAL RECTAL
Status: DISCONTINUED | OUTPATIENT
Start: 2019-02-07 | End: 2019-02-11 | Stop reason: HOSPADM

## 2019-02-07 RX ORDER — ACETAMINOPHEN 325 MG/1
650 TABLET ORAL EVERY 6 HOURS
Status: DISCONTINUED | OUTPATIENT
Start: 2019-02-07 | End: 2019-02-11 | Stop reason: HOSPADM

## 2019-02-07 RX ORDER — ACETAMINOPHEN 500 MG
1000 TABLET ORAL ONCE
Status: DISCONTINUED | OUTPATIENT
Start: 2019-02-07 | End: 2019-02-07 | Stop reason: HOSPADM

## 2019-02-07 RX ORDER — LOSARTAN POTASSIUM 50 MG/1
100 TABLET ORAL DAILY
Status: DISCONTINUED | OUTPATIENT
Start: 2019-02-07 | End: 2019-02-11 | Stop reason: HOSPADM

## 2019-02-07 RX ORDER — ACETAMINOPHEN 500 MG
500 TABLET ORAL ONCE
COMMUNITY
End: 2021-02-25

## 2019-02-07 RX ORDER — CEFAZOLIN SODIUM 2 G/100ML
2 INJECTION, SOLUTION INTRAVENOUS EVERY 8 HOURS
Status: COMPLETED | OUTPATIENT
Start: 2019-02-07 | End: 2019-02-08

## 2019-02-07 RX ORDER — AMOXICILLIN 250 MG
1 CAPSULE ORAL NIGHTLY
Status: DISCONTINUED | OUTPATIENT
Start: 2019-02-07 | End: 2019-02-11 | Stop reason: HOSPADM

## 2019-02-07 RX ORDER — AMLODIPINE BESYLATE 5 MG/1
2.5 TABLET ORAL DAILY
Status: DISCONTINUED | OUTPATIENT
Start: 2019-02-08 | End: 2019-02-11 | Stop reason: HOSPADM

## 2019-02-07 RX ORDER — AMOXICILLIN 250 MG
1 CAPSULE ORAL
Status: DISCONTINUED | OUTPATIENT
Start: 2019-02-07 | End: 2019-02-11 | Stop reason: HOSPADM

## 2019-02-07 RX ORDER — CELECOXIB 200 MG/1
200 CAPSULE ORAL ONCE
Status: DISCONTINUED | OUTPATIENT
Start: 2019-02-07 | End: 2019-02-07 | Stop reason: HOSPADM

## 2019-02-07 RX ORDER — CELECOXIB 200 MG/1
200 CAPSULE ORAL ONCE
COMMUNITY
End: 2020-08-14

## 2019-02-07 RX ORDER — HYDRALAZINE HYDROCHLORIDE 20 MG/ML
5 INJECTION INTRAMUSCULAR; INTRAVENOUS
Status: DISCONTINUED | OUTPATIENT
Start: 2019-02-07 | End: 2019-02-07 | Stop reason: HOSPADM

## 2019-02-07 RX ORDER — DOCUSATE SODIUM 100 MG/1
100 CAPSULE, LIQUID FILLED ORAL 2 TIMES DAILY
Status: DISCONTINUED | OUTPATIENT
Start: 2019-02-07 | End: 2019-02-11 | Stop reason: HOSPADM

## 2019-02-07 RX ORDER — LOVASTATIN 20 MG/1
80 TABLET ORAL EVERY EVENING
Status: DISCONTINUED | OUTPATIENT
Start: 2019-02-07 | End: 2019-02-11 | Stop reason: HOSPADM

## 2019-02-07 RX ORDER — OXYCODONE HYDROCHLORIDE 5 MG/1
2.5 TABLET ORAL
Status: DISCONTINUED | OUTPATIENT
Start: 2019-02-07 | End: 2019-02-11 | Stop reason: HOSPADM

## 2019-02-07 RX ORDER — SCOLOPAMINE TRANSDERMAL SYSTEM 1 MG/1
1 PATCH, EXTENDED RELEASE TRANSDERMAL
Status: DISCONTINUED | OUTPATIENT
Start: 2019-02-07 | End: 2019-02-08

## 2019-02-07 RX ORDER — POLYETHYLENE GLYCOL 3350 17 G/17G
1 POWDER, FOR SOLUTION ORAL 2 TIMES DAILY PRN
Status: DISCONTINUED | OUTPATIENT
Start: 2019-02-07 | End: 2019-02-11 | Stop reason: HOSPADM

## 2019-02-07 RX ORDER — ONDANSETRON 2 MG/ML
4 INJECTION INTRAMUSCULAR; INTRAVENOUS EVERY 4 HOURS PRN
Status: DISCONTINUED | OUTPATIENT
Start: 2019-02-07 | End: 2019-02-11 | Stop reason: HOSPADM

## 2019-02-07 RX ORDER — CEFAZOLIN SODIUM 2 G/100ML
2 INJECTION, SOLUTION INTRAVENOUS ONCE
Status: DISCONTINUED | OUTPATIENT
Start: 2019-02-07 | End: 2019-02-07 | Stop reason: HOSPADM

## 2019-02-07 RX ORDER — KETOROLAC TROMETHAMINE 30 MG/ML
INJECTION, SOLUTION INTRAMUSCULAR; INTRAVENOUS
Status: DISCONTINUED | OUTPATIENT
Start: 2019-02-07 | End: 2019-02-07 | Stop reason: HOSPADM

## 2019-02-07 RX ORDER — HALOPERIDOL 5 MG/ML
1 INJECTION INTRAMUSCULAR
Status: DISCONTINUED | OUTPATIENT
Start: 2019-02-07 | End: 2019-02-07 | Stop reason: HOSPADM

## 2019-02-07 RX ORDER — GABAPENTIN 300 MG/1
300 CAPSULE ORAL ONCE
Status: DISCONTINUED | OUTPATIENT
Start: 2019-02-07 | End: 2019-02-07 | Stop reason: HOSPADM

## 2019-02-07 RX ORDER — ENEMA 19; 7 G/133ML; G/133ML
1 ENEMA RECTAL
Status: DISCONTINUED | OUTPATIENT
Start: 2019-02-07 | End: 2019-02-11 | Stop reason: HOSPADM

## 2019-02-07 RX ORDER — SODIUM CHLORIDE, SODIUM LACTATE, POTASSIUM CHLORIDE, CALCIUM CHLORIDE 600; 310; 30; 20 MG/100ML; MG/100ML; MG/100ML; MG/100ML
INJECTION, SOLUTION INTRAVENOUS ONCE
Status: COMPLETED | OUTPATIENT
Start: 2019-02-07 | End: 2019-02-07

## 2019-02-07 RX ORDER — SODIUM CHLORIDE, SODIUM LACTATE, POTASSIUM CHLORIDE, CALCIUM CHLORIDE 600; 310; 30; 20 MG/100ML; MG/100ML; MG/100ML; MG/100ML
INJECTION, SOLUTION INTRAVENOUS CONTINUOUS
Status: DISCONTINUED | OUTPATIENT
Start: 2019-02-07 | End: 2019-02-07 | Stop reason: HOSPADM

## 2019-02-07 RX ORDER — BUPIVACAINE HYDROCHLORIDE 2.5 MG/ML
INJECTION, SOLUTION INFILTRATION; PERINEURAL
Status: DISCONTINUED | OUTPATIENT
Start: 2019-02-07 | End: 2019-02-07 | Stop reason: HOSPADM

## 2019-02-07 RX ORDER — DIPHENHYDRAMINE HYDROCHLORIDE 50 MG/ML
12.5 INJECTION INTRAMUSCULAR; INTRAVENOUS
Status: DISCONTINUED | OUTPATIENT
Start: 2019-02-07 | End: 2019-02-07 | Stop reason: HOSPADM

## 2019-02-07 RX ORDER — OXYCODONE HYDROCHLORIDE 5 MG/1
5 TABLET ORAL
Status: DISCONTINUED | OUTPATIENT
Start: 2019-02-07 | End: 2019-02-11 | Stop reason: HOSPADM

## 2019-02-07 RX ORDER — SCOLOPAMINE TRANSDERMAL SYSTEM 1 MG/1
1 PATCH, EXTENDED RELEASE TRANSDERMAL
Status: DISCONTINUED | OUTPATIENT
Start: 2019-02-07 | End: 2019-02-07 | Stop reason: HOSPADM

## 2019-02-07 RX ORDER — OXYCODONE HCL 10 MG/1
10 TABLET, FILM COATED, EXTENDED RELEASE ORAL ONCE
Status: COMPLETED | OUTPATIENT
Start: 2019-02-07 | End: 2019-02-07

## 2019-02-07 RX ORDER — ONDANSETRON 2 MG/ML
4 INJECTION INTRAMUSCULAR; INTRAVENOUS
Status: DISCONTINUED | OUTPATIENT
Start: 2019-02-07 | End: 2019-02-07 | Stop reason: HOSPADM

## 2019-02-07 RX ORDER — OXYCODONE HCL 5 MG/5 ML
10 SOLUTION, ORAL ORAL
Status: DISCONTINUED | OUTPATIENT
Start: 2019-02-07 | End: 2019-02-07 | Stop reason: HOSPADM

## 2019-02-07 RX ORDER — HYDROMORPHONE HYDROCHLORIDE 1 MG/ML
0.25 INJECTION, SOLUTION INTRAMUSCULAR; INTRAVENOUS; SUBCUTANEOUS
Status: DISCONTINUED | OUTPATIENT
Start: 2019-02-07 | End: 2019-02-08

## 2019-02-07 RX ORDER — MAGNESIUM HYDROXIDE 1200 MG/15ML
LIQUID ORAL
Status: COMPLETED | OUTPATIENT
Start: 2019-02-07 | End: 2019-02-07

## 2019-02-07 RX ADMIN — CEFAZOLIN SODIUM 2 G: 2 INJECTION, SOLUTION INTRAVENOUS at 22:02

## 2019-02-07 RX ADMIN — LOVASTATIN 80 MG: 20 TABLET ORAL at 22:00

## 2019-02-07 RX ADMIN — LOSARTAN POTASSIUM 100 MG: 50 TABLET ORAL at 22:01

## 2019-02-07 RX ADMIN — ACETAMINOPHEN 650 MG: 325 TABLET, FILM COATED ORAL at 22:00

## 2019-02-07 RX ADMIN — SODIUM CHLORIDE, SODIUM LACTATE, POTASSIUM CHLORIDE, CALCIUM CHLORIDE: 600; 310; 30; 20 INJECTION, SOLUTION INTRAVENOUS at 12:44

## 2019-02-07 RX ADMIN — VITAMIN D, TAB 1000IU (100/BT) 1000 UNITS: 25 TAB at 22:01

## 2019-02-07 RX ADMIN — Medication 1 TABLET: at 22:01

## 2019-02-07 RX ADMIN — POTASSIUM CHLORIDE, DEXTROSE MONOHYDRATE AND SODIUM CHLORIDE 1000 ML: 150; 5; 450 INJECTION, SOLUTION INTRAVENOUS at 15:27

## 2019-02-07 RX ADMIN — OXYCODONE HYDROCHLORIDE 10 MG: 10 TABLET, FILM COATED, EXTENDED RELEASE ORAL at 12:40

## 2019-02-07 NOTE — OR NURSING
POC reviewed with pt. Call light within reach, educated to call for assistance, hourly rounding in place, bed in lowest position and locked.

## 2019-02-08 PROCEDURE — 97162 PT EVAL MOD COMPLEX 30 MIN: CPT

## 2019-02-08 PROCEDURE — A9270 NON-COVERED ITEM OR SERVICE: HCPCS | Performed by: ORTHOPAEDIC SURGERY

## 2019-02-08 PROCEDURE — 700102 HCHG RX REV CODE 250 W/ 637 OVERRIDE(OP): Performed by: ORTHOPAEDIC SURGERY

## 2019-02-08 PROCEDURE — 700112 HCHG RX REV CODE 229: Performed by: ORTHOPAEDIC SURGERY

## 2019-02-08 PROCEDURE — 99499 UNLISTED E&M SERVICE: CPT | Performed by: INTERNAL MEDICINE

## 2019-02-08 PROCEDURE — 770001 HCHG ROOM/CARE - MED/SURG/GYN PRIV*

## 2019-02-08 PROCEDURE — 700111 HCHG RX REV CODE 636 W/ 250 OVERRIDE (IP): Performed by: ORTHOPAEDIC SURGERY

## 2019-02-08 PROCEDURE — 97166 OT EVAL MOD COMPLEX 45 MIN: CPT

## 2019-02-08 RX ADMIN — LOVASTATIN 80 MG: 20 TABLET ORAL at 17:17

## 2019-02-08 RX ADMIN — OMEPRAZOLE 20 MG: 20 CAPSULE, DELAYED RELEASE ORAL at 05:44

## 2019-02-08 RX ADMIN — DOCUSATE SODIUM 100 MG: 100 CAPSULE, LIQUID FILLED ORAL at 05:44

## 2019-02-08 RX ADMIN — POTASSIUM CHLORIDE 20 MEQ: 1500 TABLET, EXTENDED RELEASE ORAL at 05:44

## 2019-02-08 RX ADMIN — ACETAMINOPHEN 650 MG: 325 TABLET, FILM COATED ORAL at 10:58

## 2019-02-08 RX ADMIN — CEFAZOLIN SODIUM 2 G: 2 INJECTION, SOLUTION INTRAVENOUS at 05:44

## 2019-02-08 RX ADMIN — ASPIRIN 81 MG: 81 TABLET, COATED ORAL at 21:55

## 2019-02-08 RX ADMIN — DOCUSATE SODIUM 100 MG: 100 CAPSULE, LIQUID FILLED ORAL at 17:17

## 2019-02-08 RX ADMIN — VITAMIN D, TAB 1000IU (100/BT) 1000 UNITS: 25 TAB at 05:44

## 2019-02-08 RX ADMIN — ASPIRIN 81 MG: 81 TABLET, COATED ORAL at 08:25

## 2019-02-08 RX ADMIN — ACETAMINOPHEN 650 MG: 325 TABLET, FILM COATED ORAL at 17:20

## 2019-02-08 RX ADMIN — ACETAMINOPHEN 650 MG: 325 TABLET, FILM COATED ORAL at 05:41

## 2019-02-08 RX ADMIN — Medication 1 TABLET: at 21:56

## 2019-02-08 RX ADMIN — ACETAMINOPHEN 650 MG: 325 TABLET, FILM COATED ORAL at 21:55

## 2019-02-08 ASSESSMENT — PATIENT HEALTH QUESTIONNAIRE - PHQ9
2. FEELING DOWN, DEPRESSED, IRRITABLE, OR HOPELESS: NOT AT ALL
1. LITTLE INTEREST OR PLEASURE IN DOING THINGS: NOT AT ALL
SUM OF ALL RESPONSES TO PHQ9 QUESTIONS 1 AND 2: 0

## 2019-02-08 ASSESSMENT — COGNITIVE AND FUNCTIONAL STATUS - GENERAL
DRESSING REGULAR LOWER BODY CLOTHING: A LOT
DAILY ACTIVITIY SCORE: 18
HELP NEEDED FOR BATHING: A LOT
DRESSING REGULAR UPPER BODY CLOTHING: A LITTLE
TOILETING: A LITTLE
TURNING FROM BACK TO SIDE WHILE IN FLAT BAD: A LOT
DAILY ACTIVITIY SCORE: 15
DRESSING REGULAR LOWER BODY CLOTHING: A LITTLE
SUGGESTED CMS G CODE MODIFIER DAILY ACTIVITY: CK
PERSONAL GROOMING: A LOT
MOVING FROM LYING ON BACK TO SITTING ON SIDE OF FLAT BED: A LOT
TOILETING: A LITTLE
TURNING FROM BACK TO SIDE WHILE IN FLAT BAD: A LOT
WALKING IN HOSPITAL ROOM: A LITTLE
SUGGESTED CMS G CODE MODIFIER MOBILITY: CK
SUGGESTED CMS G CODE MODIFIER MOBILITY: CL
CLIMB 3 TO 5 STEPS WITH RAILING: A LITTLE
STANDING UP FROM CHAIR USING ARMS: A LITTLE
DRESSING REGULAR UPPER BODY CLOTHING: A LOT
CLIMB 3 TO 5 STEPS WITH RAILING: A LOT
MOVING TO AND FROM BED TO CHAIR: A LOT
MOBILITY SCORE: 19
MOBILITY SCORE: 14
SUGGESTED CMS G CODE MODIFIER DAILY ACTIVITY: CK
PERSONAL GROOMING: A LITTLE
HELP NEEDED FOR BATHING: A LOT
MOVING TO AND FROM BED TO CHAIR: A LOT

## 2019-02-08 ASSESSMENT — GAIT ASSESSMENTS
ASSISTIVE DEVICE: 4 WHEEL WALKER
DEVIATION: STEP TO
GAIT LEVEL OF ASSIST: SUPERVISED
DISTANCE (FEET): 100

## 2019-02-08 ASSESSMENT — LIFESTYLE VARIABLES: EVER_SMOKED: NEVER

## 2019-02-08 ASSESSMENT — ACTIVITIES OF DAILY LIVING (ADL): TOILETING: INDEPENDENT

## 2019-02-08 NOTE — PROGRESS NOTES
No c/o  AVSS  Dressing dry  SCD on  Calf NT  + DF/PF  XR OK    Plan:    Mobilize, PT/OT, posterior hip precautions  WBAT LLE  DVT proph with SCDs, ASA, mobilization  D/c planning  Home Sunday?

## 2019-02-08 NOTE — OR NURSING
Pt sleeping, resps unlabored, opens eyes to voice. Tolerated sips of juice, no complaints of nausea, no pain. Dressing CDI, ice pack to the site. Family updated.

## 2019-02-08 NOTE — PROGRESS NOTES
Spoke with Son who stated that patient could not take oxycodone as she had a bad reaction to it after her last surgery. Patient ordered for oxycodone, night shift RN to clarify on orders.

## 2019-02-08 NOTE — PROGRESS NOTES
Patient arrived from PACU. Patient very drowsy, arouses to voice but falls asleep mid conversations. Continuous pulse oximetry in place.

## 2019-02-08 NOTE — CARE PLAN
Problem: Respiratory:  Goal: Respiratory status will improve    Intervention: Administer and titrate oxygen therapy  O2 at 2L/min via NC. O2Sat at 100%.   Intervention: Educate and encourage coughing and deep breathing  Patient encourage to cough and deep breath.       Problem: Skin Integrity  Goal: Risk for impaired skin integrity will decrease    Intervention: Assess and monitor skin integrity, appearance and/or temperature  Blanchable redness to buttocks; Mepilex and waffle cushion in place.

## 2019-02-08 NOTE — THERAPY
"Occupational Therapy Evaluation completed.   Functional Status:    79 y/o female s/p L [place], with posterior hip precautions. Pt received ambulating in [place] with PT for switch off. Pt continued to ambulate to room using her personal 4WW. Once at the chair, pt required Max VCs and CGA to sit onto chair. She was told to lock the brakes on her 4WW first, however she stated that she didn't know how because she had never used it before. Once seated, she was educated on posterior hip precautions, however she will certainly need these to be reinforced. Pt completed LB dressing using a reacher and Mod A + Mod VCs, after demo. She then stood up and used the FWW to ambulate to the bathroom. Once there, she completed toilet xfer with CGA and specific instructions. Next she completed standing G/H with SBA and min VCs. She then walked back to the chair. Pt has unknown baseline cog, however she presented with significant cognitive deficits today, requiring max cuing for sequencing, initiation, and command following. She was very forgetful throughout and often spoke in sentences that did not make sense, presenting with safety awareness deficits and not always remembering which leg she had surgery on. She would benefit from continued OT in this setting and reinforced education on post-op precautions.     Plan of Care: Will benefit from Occupational Therapy 5 times per week  Discharge Recommendations:  Equipment: Will Continue to Assess for Equipment Needs. Post-acute therapy : Home with 24/7 supervision vs. Post-acute placement.     See \"Rehab Therapy-Acute\" Patient Summary Report for complete documentation.    "

## 2019-02-08 NOTE — CARE PLAN
Problem: Oxygenation/Respiratory Function  Goal: Patient will Achieve/Maintain Optimal Respiratory Function    Intervention: Administer/Titrate Oxygen as ordered  Patient currently on 10L oxymask per ERAS protocol.      Problem: Pain  Goal: Alleviation of Pain or a reduction in pain to the patient's comfort goal  Patient states pain well controlled at this time.

## 2019-02-08 NOTE — PROGRESS NOTES
Patient continues to be very drowsy. Continuous pulse oximetry in place. Patient arouses very easily however falls back asleep quickly. Able to maintain short conversations at this time. Unable to ambulate at this time due to sedation.

## 2019-02-08 NOTE — THERAPY
Able to mobilize in/out of bed w/ mod assist for LLE only. Once sitting, able to stand and ambulate w/ her own 4ww w/o need for assist. Unable to repeat posterior hip precautions. PT will follow and address bed mobility and precautions. May benefit from post acute inpt stay, depending upon availability of pts son.

## 2019-02-08 NOTE — PROGRESS NOTES
· 2 RN skin check complete with MAXIMINO Allen.  · Devices in place SCDs  .  · Skin assessed under devices Inact.  · Confirmed pressure ulcers found on NONE.  · Slight redness under pannus. Bruising to right ankle and right upper thigh. Redness to sacrum and buttocks. Blanching at this time. Small open area to left buttock, blanches over wound. Sacral Mepilex applied.  · New potential pressure ulcers noted on Left buttock. Wound consult placed and wound reported.  · The following interventions in place Sacral Mepilex, Turning Q2hrs, Waffle overlay

## 2019-02-08 NOTE — OP REPORT
DATE OF SERVICE:  02/07/2019    PREOPERATIVE DIAGNOSIS:  Degenerative joint disease, left hip.    POSTOPERATIVE DIAGNOSIS:  Degenerative joint disease, left hip.    SURGICAL PROCEDURES:  Left total hip arthroplasty.    SURGEON:  Dion Dennis MD    ASSISTANT:  JL Morales    ANESTHESIOLOGIST:  Sandra Dwyer MD    ANESTHETIC:  General.    ESTIMATED BLOOD LOSS:  150 mL    IMPLANTS:  1.  Gupat and Nephew R3, 52 mm, 3-hole acetabular shell.  2.  Two acetabular screws (6.5 mm x 50 mm, 6.5 mm x 20 mm).  3.  32/52, 0-degree polyethylene liner.  4.  Anthology size 8 femoral stem.  5.  A 36 mm, +8 Oxinium femoral head.    INDICATIONS:  The patient is 78 years old.  She has severe arthritis in her   left hip, failed conservative treatment.  This has been getting worse.  We   have discussed the options and she wished to proceed with total hip   arthroplasty.  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  She has recovered from this.    Risks of this procedure were discussed with the patient and these include   bleeding, infection, neurovascular injury, pain, stiffness, leg length   discrepancy, fracture, dislocation, implant failure, thromboembolic phenomena,   anesthetic and medical complications, etc.    PROCEDURE:  The patient was identified in the preoperative holding area.  Left   leg was marked.  She was taken to the operating room where general anesthetic   was administered.  Intravenous antibiotics were given.  She was placed in the   lateral decubitus position with the left side up.  The left hindquarter was   then prepped and draped in the sterile fashion.  Time-out was held to confirm   the patient identity and correct surgical site.    Posterior approach to the hip was performed.  A longitudinal incision was made   over the greater trochanter.  This was carried down  to the IT band, which was   split longitudinally.  The split was carried into the gluteus alicia in line   with its fibers.  Short external rotators were elevated off the proximal   femur.  A capsulotomy was then performed.  The hip was dislocated.  Femoral   head was severely arthritic with no remaining cartilage.  The femoral neck was   osteotomized and the femoral head was removed.    I then exposed the acetabulum with anterior retractor and a supra-acetabular   3.2 mm Steinmann pin.  Then, between the capsule and labrum, I placed another   Steinmann pin into the ischium posteriorly.  I released the inferior capsule.   Pulvinar was then excised.    I then reamed sequentially from 46 mm to 52 mm where I had good bony contact   with the anterior and posterior walls and we had some bleeding bone.  I did   not ream significantly in the medial direction as there was minimal bony overgrowth.    The acetabular cup was then inserted.  It has good pressfit.  Two screws were then   placed into the ileum.  These had good purchase.  Then the acetabular liner   was placed in a standard fashion.  Large overhanging osteophytes were removed   with curved osteotome.    I directed my attention to the femur which was prepared with box osteotome and a   .  I serially broached up to size 8 where the trial had good. I used the   calcar reamer and then trialed with a standard head.  This was too loose and little   bit unstable, so we went ahead with +4 and then with +8 where we had a little   bit of tightness with the extension, approximately equal leg lengths and a   much better stability.    The trials were removed.  I inserted the Anthology stem.  I then trialed again   and found that +8 was the best option.  The Thompson taper was washed and dried.    I placed the Oxinium head.  I used the Oxinium head due to history of   trunnionosis on the other side.    A short dilute Betadine soak was then performed.  I irrigated out  the wound   with pulsatile lavage (saline).  Periarticular injection was administered.    The capsule was repaired with #1 Vicryl.  Because we had lengthened the leg,   which was a centimeter short to begin with, the short external rotators were   could not be reapproximated to the proximal femur, so other deep tissue was   closed as a second layer with #1 Vicryl.  Then, the IT band was closed with #1   Vicryl as was the gluteus alicia fascia.  The skin was then closed with 2-0   Vicryl and 3-0 Monocryl.  Steri-Strips were applied followed by gauze and   Tegaderm.  The patient was transferred to the bed in the supine position.  We   noted approximately equal leg lengths.  She was then extubated and taken to   the recovery room in stable condition.    POSTOPERATIVE PLAN:  1.  Intravenous antibiotics for 24 hours.  2.  DVT prophylaxis with early mobilization, SCDs and aspirin 81 mg PO   BID for 4 weeks.  3.  AP pelvis radiograph in recovery room.  4.  Weight-bearing as tolerated.       ____________________________________     MD LAVELL RODARTE / RUTH    DD:  02/07/2019 17:38:27  DT:  02/07/2019 19:36:21    D#:  [id number]  Job#:  [id number]

## 2019-02-09 PROCEDURE — 700102 HCHG RX REV CODE 250 W/ 637 OVERRIDE(OP): Performed by: PHYSICIAN ASSISTANT

## 2019-02-09 PROCEDURE — 700112 HCHG RX REV CODE 229: Performed by: ORTHOPAEDIC SURGERY

## 2019-02-09 PROCEDURE — A9270 NON-COVERED ITEM OR SERVICE: HCPCS | Performed by: ORTHOPAEDIC SURGERY

## 2019-02-09 PROCEDURE — 97530 THERAPEUTIC ACTIVITIES: CPT

## 2019-02-09 PROCEDURE — A9270 NON-COVERED ITEM OR SERVICE: HCPCS | Performed by: PHYSICIAN ASSISTANT

## 2019-02-09 PROCEDURE — 700102 HCHG RX REV CODE 250 W/ 637 OVERRIDE(OP): Performed by: ORTHOPAEDIC SURGERY

## 2019-02-09 PROCEDURE — 770001 HCHG ROOM/CARE - MED/SURG/GYN PRIV*

## 2019-02-09 RX ORDER — CELECOXIB 200 MG/1
200 CAPSULE ORAL DAILY
Status: DISCONTINUED | OUTPATIENT
Start: 2019-02-09 | End: 2019-02-11 | Stop reason: HOSPADM

## 2019-02-09 RX ADMIN — DOCUSATE SODIUM 100 MG: 100 CAPSULE, LIQUID FILLED ORAL at 05:24

## 2019-02-09 RX ADMIN — ACETAMINOPHEN 650 MG: 325 TABLET, FILM COATED ORAL at 09:22

## 2019-02-09 RX ADMIN — AMLODIPINE BESYLATE 2.5 MG: 5 TABLET ORAL at 05:23

## 2019-02-09 RX ADMIN — POTASSIUM CHLORIDE 20 MEQ: 1500 TABLET, EXTENDED RELEASE ORAL at 05:25

## 2019-02-09 RX ADMIN — ACETAMINOPHEN 650 MG: 325 TABLET, FILM COATED ORAL at 05:24

## 2019-02-09 RX ADMIN — ASPIRIN 81 MG: 81 TABLET, COATED ORAL at 21:27

## 2019-02-09 RX ADMIN — CELECOXIB 200 MG: 200 CAPSULE ORAL at 15:04

## 2019-02-09 RX ADMIN — ASPIRIN 81 MG: 81 TABLET, COATED ORAL at 09:22

## 2019-02-09 RX ADMIN — LOSARTAN POTASSIUM 100 MG: 50 TABLET ORAL at 05:24

## 2019-02-09 RX ADMIN — ACETAMINOPHEN 650 MG: 325 TABLET, FILM COATED ORAL at 21:27

## 2019-02-09 RX ADMIN — OMEPRAZOLE 20 MG: 20 CAPSULE, DELAYED RELEASE ORAL at 05:24

## 2019-02-09 RX ADMIN — VITAMIN D, TAB 1000IU (100/BT) 1000 UNITS: 25 TAB at 05:25

## 2019-02-09 RX ADMIN — DOCUSATE SODIUM 100 MG: 100 CAPSULE, LIQUID FILLED ORAL at 17:02

## 2019-02-09 RX ADMIN — ACETAMINOPHEN 650 MG: 325 TABLET, FILM COATED ORAL at 15:04

## 2019-02-09 RX ADMIN — LOVASTATIN 80 MG: 20 TABLET ORAL at 17:02

## 2019-02-09 RX ADMIN — Medication 1 TABLET: at 21:27

## 2019-02-09 RX ADMIN — OXYCODONE HYDROCHLORIDE 2.5 MG: 5 TABLET ORAL at 13:48

## 2019-02-09 ASSESSMENT — GAIT ASSESSMENTS
ASSISTIVE DEVICE: 4 WHEEL WALKER
DISTANCE (FEET): 135
GAIT LEVEL OF ASSIST: SUPERVISED
DEVIATION: OTHER (COMMENT)

## 2019-02-09 ASSESSMENT — COGNITIVE AND FUNCTIONAL STATUS - GENERAL
MOVING TO AND FROM BED TO CHAIR: UNABLE
CLIMB 3 TO 5 STEPS WITH RAILING: A LITTLE
SUGGESTED CMS G CODE MODIFIER MOBILITY: CK
MOBILITY SCORE: 15
STANDING UP FROM CHAIR USING ARMS: A LITTLE
WALKING IN HOSPITAL ROOM: A LITTLE
TURNING FROM BACK TO SIDE WHILE IN FLAT BAD: A LOT
MOVING FROM LYING ON BACK TO SITTING ON SIDE OF FLAT BED: A LITTLE

## 2019-02-09 NOTE — CONSULTS
[facility]iatric Consult.   Patient Name: Olesya Harmon  Patient Age, Gender: 78 y.o., female  Date of Consult:2/8/2019      Name of Requesting Consultant(s): Dion Dennis M.D., Kuldip Joaquin  Consultant: Mackenzie Coleman M.D.  Reason for Consult: delirium    Chief Complaint: Delirium s/p hip arthroplasty    History of Present Illness:  Olesya Harmon is a 78 y.o. female admitted 7 February for left hip replacement for severe arthritis. Patient tolerated procedure and has started therapy.  Geriatrics was consulted today for delirium.  Per nursing report, patient forgot her last name and speech was a bit disorganized. Improved a bit though the day. She does relate to me seeing things when first opening her eyes.  She does not normally take pain medication at home.  Currently denies headache, fevers, chills, cough, dyspnea, chest pain, nausea, vomiting, diarrhea, dysuria, abnormal bleeding or bruising.  Her appetite is good and bowels are regular. She does note that prior to admission she noticed swelling in her lower legs.  She discussed this with her primary care doctor who thought it might be related to her diet.  She denies calf pain. Participating in PT/OT.  She lives at home with her sone and ambulates with a walker. Independent in ADLs. Has a PCP and had a negative mammogram within the past year.      Activities of Daily Living:   Patient is independent in her ADLs    ROS:A 14 sytem ROS is negative other than as stated above in HPI.     Past Medical History:   Diagnosis Date   • Arthritis     left hand   • Arthritis of left hip 10/8/2018   • CATARACT     joan surgery   • Essential hypertension 5/3/2018   • Heart murmur 10/8/2018   • High cholesterol    • Hypercholesteremia    • Hypercholesterolemia 5/3/2018   • Hypertension    • Hypokalemia 5/3/2018   • Iron deficiency anemia due to chronic blood loss 5/3/2018   • Menopause 6/4/2018   • Nonrheumatic aortic valve insufficiency 10/8/2018   • Prediabetes  5/3/2018   • Sacroiliitis, not elsewhere classified (HCC) 6/15/2018    Injected 2017 by Dr. Zollinger       Current Facility-Administered Medications:   •  amLODIPine (NORVASC) tablet 2.5 mg, 2.5 mg, Oral, DAILY, Dion Dennis M.D., Stopped at 02/08/19 0541  •  losartan (COZAAR) tablet 100 mg, 100 mg, Oral, DAILY, Dion Dennis M.D., Stopped at 02/08/19 0542  •  lovastatin (MEVACOR) tablet 80 mg, 80 mg, Oral, Q EVENING, Dion Dennis M.D., 80 mg at 02/07/19 2200  •  omeprazole (PRILOSEC) capsule 20 mg, 20 mg, Oral, DAILY, Dion Dennis M.D., 20 mg at 02/08/19 0544  •  potassium chloride SA (Kdur) tablet 20 mEq, 20 mEq, Oral, DAILY, Dion Dennis M.D., 20 mEq at 02/08/19 0544  •  vitamin D (cholecalciferol) tablet 1,000 Units, 1,000 Units, Oral, DAILY, Dion Dennis M.D., 1,000 Units at 02/08/19 0544  •  Pharmacy Consult Request ...Pain Management Review 1 Each, 1 Each, Other, PHARMACY TO DOSE, Dion Dennis M.D.  •  ondansetron (ZOFRAN) syringe/vial injection 4 mg, 4 mg, Intravenous, Q4HRS PRN, Dion Dennis M.D.  •  scopolamine (TRANSDERM-SCOP) patch 1 Patch, 1 Patch, Transdermal, Q72HRS PRN, Dion Dennis M.D.  •  docusate sodium (COLACE) capsule 100 mg, 100 mg, Oral, BID, Dion Dennis M.D., 100 mg at 02/08/19 0544  •  senna-docusate (PERICOLACE or SENOKOT S) 8.6-50 MG per tablet 1 Tab, 1 Tab, Oral, Nightly, Dion Dennis M.D., 1 Tab at 02/07/19 2201  •  senna-docusate (PERICOLACE or SENOKOT S) 8.6-50 MG per tablet 1 Tab, 1 Tab, Oral, Q24HRS PRN, Dion Dennis M.D.  •  polyethylene glycol/lytes (MIRALAX) PACKET 1 Packet, 1 Packet, Oral, BID PRN, Dion Dennis M.D.  •  magnesium hydroxide (MILK OF MAGNESIA) suspension 30 mL, 30 mL, Oral, QDAY PRN, Dion Dennis M.D.  •  bisacodyl (DULCOLAX) suppository 10 mg, 10 mg, Rectal, Q24HRS PRN, Dion Dennis M.D.  •  fleet enema 133 mL, 1 Each, Rectal, Once PRN, Dion Dennis M.D.  •  aspirin EC (ECOTRIN)  tablet 81 mg, 81 mg, Oral, BID, Dion Dennis M.D., 81 mg at 02/08/19 0825  •  acetaminophen (TYLENOL) tablet 650 mg, 650 mg, Oral, Q6HRS, Dion Dennis M.D., 650 mg at 02/08/19 1058  •  oxyCODONE immediate-release (ROXICODONE) tablet 2.5 mg, 2.5 mg, Oral, Q3HRS PRN, Dion Dennis M.D.  •  oxyCODONE immediate-release (ROXICODONE) tablet 5 mg, 5 mg, Oral, Q3HRS PRN, Dion Dennis M.D.  •  HYDROmorphone pf (DILAUDID) injection 0.25 mg, 0.25 mg, Intravenous, Q3HRS PRN, Dion Dennis M.D.  Social History     Social History   • Marital status:      Spouse name: N/A   • Number of children: N/A   • Years of education: N/A     Occupational History   • Not on file.     Social History Main Topics   • Smoking status: Never Smoker   • Smokeless tobacco: Never Used   • Alcohol use No   • Drug use: No   • Sexual activity: No      Comment: , office supply     Other Topics Concern   • Not on file     Social History Narrative   • No narrative on file     Family History   Problem Relation Age of Onset   • Other Mother         cause unknown   • Kidney Disease Father         went off dialysis     Past Surgical History:   Procedure Laterality Date   • HIP ARTHROPLASTY TOTAL Left 2/7/2019    Procedure: HIP ARTHROPLASTY TOTAL;  Surgeon: Dion Dennis M.D.;  Location: [facility];  Service: Orthopedics   • HIP REVISION TOTAL Right 2/20/2018    Procedure: HIP REVISION TOTAL;  Surgeon: Dion Dennis M.D.;  Location: [facility];  Service: Orthopedics   • JOINT INJECTION DIAGNOSTIC Right 1/16/2018    Procedure: JOINT INJECTION DIAGNOSTIC - FOR HIP ASPIRATION;  Surgeon: Dion Dennis M.D.;  Location: [facility];  Service: Orthopedics   • CATARACT PHACO WITH IOL  5/9/2013    Performed by Haja Toussaint M.D. at [facility]   • HIP ARTHROPLASTY TOTAL  11/21/2011    Performed by ANH HIRSCH at [facility]   • CARPAL TUNNEL REL    "  • OTHER ORTHOPEDIC SURGERY  2009?    right knee replacement, Sylvie         Physical Exam:  /44   Pulse 71   Temp 36 °C (96.8 °F) (Temporal)   Resp 16   Ht 1.727 m (5' 8\")   Wt 87.2 kg (192 lb 3.9 oz)   SpO2 94%   Breastfeeding? No   BMI 29.23 kg/m²   General: No acute distress, appears stated age.  HEENT: Atraumatic normocephalic, PERRLA, mild difficulty hearing spoken voice, dentition intact,  Lungs: CTA  Cardiovascular regular rate and rhythm without murmur.  Radial and pedal pulses are 2+ and symmetrical.  Abdomen: Bowel sounds positive soft nontender no masses.  Lymph: No cervical or axillary adenopathy  Extremities: Trace pitting lower extremities bilaterally.  Calves are soft and nontender no heat or erythema.  Skin: No rash  Neuro: Cranial nerves II through XII grossly intact.  Muscle strength 5 out of 5.  Delirium Screen: Able to see days of the week backwards without error  Alert: Patient is alert  Orientation: Oriented to person, place, date and situation.  Attention: Attention wanders to a degree with slightly tangential answers but always coming back to the point.  Vision Screen: Uses reading glasses.  Hearing Screen: Occasional difficulty hearing spoken voice and normal tone     Labs:   Lab Results   Component Value Date/Time    SODIUM 142 01/16/2019 12:05 PM    POTASSIUM 3.8 01/16/2019 12:05 PM    CHLORIDE 107 01/16/2019 12:05 PM    CO2 27 01/16/2019 12:05 PM    GLUCOSE 117 (H) 01/16/2019 12:05 PM    BUN 19 01/16/2019 12:05 PM    CREATININE 0.92 01/16/2019 12:05 PM    CREATININE 1.3 12/30/2008 11:20 PM      Lab Results   Component Value Date/Time    WBC 5.8 01/16/2019 12:05 PM    RBC 4.53 01/16/2019 12:05 PM    HEMOGLOBIN 13.4 01/16/2019 12:05 PM    HEMATOCRIT 40.2 01/16/2019 12:05 PM    MCV 88.7 01/16/2019 12:05 PM    MCH 29.6 01/16/2019 12:05 PM    MCHC 33.3 (L) 01/16/2019 12:05 PM    MPV 9.3 01/16/2019 12:05 PM    NEUTSPOLYS 66.40 01/16/2019 12:05 PM    LYMPHOCYTES 20.10 (L) " 01/16/2019 12:05 PM    MONOCYTES 8.90 01/16/2019 12:05 PM    EOSINOPHILS 3.10 01/16/2019 12:05 PM    BASOPHILS 1.20 01/16/2019 12:05 PM    HYPOCHROMIA 1+ 01/21/2014 08:30 AM      Imaging:   BONE MINERALIZATION: Slightly decreased.  JOINTS: New left total hip arthroplasty appears well seated. Stable position of the right total hip arthroplasty.  FRACTURE: None detected.  DISLOCATION: None.  SOFT TISSUES: Postsurgical soft tissue swelling and soft tissue gas  EKG:  NSR QTc 433    Assessment/Plan:  1.  Delirium- per patient relates seeing things that she knows are not there. Per nursing report she appears to have improved over the course of the day. Her thought process is slightly unfocused but she maintains attention and is oriented at the moment. She is not agitated or combative.  I suspect that her delirium is secondary to meds that were administered perioperatively for pain specifically the OxyContin that she received yesterday and the Duramorph that she received today.  Also she had had a scopolamine patch placed although it is not clear how long it was on before it was removed.  All of these could contribute to delirium in this 78-year-old female.  Additionally, she is in an unfamiliar environment recently had anesthesia.  Would recommend discontinuing all medications that could precipitate this specifically the prn Dilaudid and the scopolamine patch. She is on scheduled Tylenol for pain control which I agree with and would consider leaving the Roxicet for breakthrough pain only if severe.  Interventions to be considered in all patients in order to minimize the risk of delirium.   -do not disturb patient (vitals or lab draws) between the hours of 10 PM and 6 AM.  -ideally the patient should not sleep during the day and we should avoid day time naps.   -up in chair for meals  -ambulate at least three times daily, as able  -watch for constipation  -timed voiding - ask patient is she would like to go to the bathroom  q 2-3 hours, except during the do not disturb hours.   -remove all necessary lines (central lines, peripheral IVs, feeding tubes, chowdhury catheters)  -unless patient has shown harm to self or others I would recommend against use of restraints - either chemical or physical (antipsychotics)   -minimize polypharmacy, do not dose medication during sleep hours    2) Status post left hip replacement patient is on aspirin and SCDs for DVT prophylaxis.  I do not see a contraindication for using low molecular weight heparin and would consider starting that for prophylaxis if the delirium prevents normal activity.      Thank you for this interesting consult. We will continue to follow that patient along with you on Monday if the patient is still admitted. Unfortunately we do not have weekend or night time coverage. Dr. Meredith to be on service.     Mackenzie Coleman M.D.  Geriatric Attending  [facility]  Available Monday-Friday 8:00-5:00 (excudling holidays)        Direct Links to Patient Handouts:    CDC Healthy Aging  NIH National [place] on Aging  [facility] Patient Resources    Links that can be Cut and Paste into your browser:    Healthy Aging  www.healthinaging.org  Staying Active  www.activeandhealthy.nsw.gov.au  Geriatrics Online   https://geriatricscareonline.org/ProductAbstract/ags-[email or web address]-[email or web address]

## 2019-02-09 NOTE — CARE PLAN
Problem: Safety  Goal: Will remain free from injury  Outcome: PROGRESSING AS EXPECTED  Bed alarm on, room close to RN station, rounding in place. Continue to reinforce education on posterior hip precautions.    Problem: Infection  Goal: Will remain free from infection  Outcome: PROGRESSING AS EXPECTED  Original surgical dressing CDI, labs and VS monitored as available. No s/sx infection at this time. Standard precautions in place, promote nutrition.    Problem: Venous Thromboembolism (VTW)/Deep Vein Thrombosis (DVT) Prevention:  Goal: Patient will participate in Venous Thrombosis (VTE)/Deep Vein Thrombosis (DVT)Prevention Measures  Outcome: PROGRESSING AS EXPECTED  SCDs, aspirin

## 2019-02-09 NOTE — PROGRESS NOTES
Pt c/o cramping pain in left hip, in obvious discomfort, but declines prn oxy.    Repositioned pt and provided ice pack. 15 minutes later, pt moaning, rubbing hip, stating that it hurts, crying.     Pt agrees to take lower dose of available prn oxy to help with the pain. Lower dose also chosen to attempt to prevent altered mentation d/t hx of post-operative delirium.

## 2019-02-09 NOTE — PROGRESS NOTES
"Asked to eval patient for episodic disorientation and incoherent speech patterns.  Pt new to our service, son states she has essentially been homeboungd the past six months except when he is available to assist her with transportation, appoint,ments, shopping, etc.  He may need respite caregiver although he intends to take vacation next week to assist with her home care after anticipated discharge in 2 days.  /44   Pulse 71   Temp 36 °C (96.8 °F) (Temporal)   Resp 16   Ht 1.727 m (5' 8\")   Wt 87.2 kg (192 lb 3.9 oz)   SpO2 94%   Breastfeeding? No   BMI 29.23 kg/m²   Dressings CDI  Alert but oriented only to self.  .  A/P   S/P elective arthroplasty  Most likely perioperative recovery syndrome of the elderly; however, I have asked the Geriatric team for a consultation to r/o delirium and recommend appropriated meds to use and to avoid.  "

## 2019-02-09 NOTE — PROGRESS NOTES
"   Orthopaedic PA Progress Note    Interval changes:slow to mobilize d/t/ pain, RN at bedside    ROS - Patient denies any new issues. No chest pain, dyspnea, or fever.  Pain well controlled.    Blood pressure 111/48, pulse 82, temperature 35.8 °C (96.5 °F), temperature source Temporal, resp. rate 16, height 1.727 m (5' 8\"), weight 87.2 kg (192 lb 3.9 oz), SpO2 98 %, not currently breastfeeding.    Patient seen and examined  No acute distress  Breathing non labored  RRR  Surgical dressing is clean, dry, and intact. Patient clearly fires tibialis anterior, EHL, and gastrocnemius/soleus. Sensation is intact to light touch throughout superficial peroneal, deep peroneal, tibial, saphenous, and sural nerve distributions. Strong and palpable 2+ dorsalis pedis and posterior tibial pulses with capillary refill less than 2 seconds. No lower leg tenderness or discomfort.    Assessment/Plan:  POD#2 S/P L [place]  Wt bearing status - AT with PT/OT/CNA/RN  OOB at least TID and meals  PT/OT-initiated  Wound care:dressing left in place  Drains - no  [place]-no  Sutures/Staples out- 10-14 days post operatively  Antibiotics: complete  DVT Prophylaxis- TEDS/SCDs/Foot pumps.   ASA 81 BID  Future Procedures - not anticipated  Case Coordination for Discharge Planning - Disposition home when cleared by PT/OT  Geriatric consult appreciated  Adding celecoxib daily, avoid giving within 2 hrs of ASA dose      "

## 2019-02-10 LAB
ANION GAP SERPL CALC-SCNC: 10 MMOL/L (ref 0–11.9)
BUN SERPL-MCNC: 27 MG/DL (ref 8–22)
CALCIUM SERPL-MCNC: 8.5 MG/DL (ref 8.5–10.5)
CHLORIDE SERPL-SCNC: 109 MMOL/L (ref 96–112)
CO2 SERPL-SCNC: 23 MMOL/L (ref 20–33)
CREAT SERPL-MCNC: 0.99 MG/DL (ref 0.5–1.4)
GLUCOSE SERPL-MCNC: 100 MG/DL (ref 65–99)
POTASSIUM SERPL-SCNC: 3.7 MMOL/L (ref 3.6–5.5)
SODIUM SERPL-SCNC: 142 MMOL/L (ref 135–145)

## 2019-02-10 PROCEDURE — A9270 NON-COVERED ITEM OR SERVICE: HCPCS | Performed by: ORTHOPAEDIC SURGERY

## 2019-02-10 PROCEDURE — 700112 HCHG RX REV CODE 229: Performed by: ORTHOPAEDIC SURGERY

## 2019-02-10 PROCEDURE — 770001 HCHG ROOM/CARE - MED/SURG/GYN PRIV*

## 2019-02-10 PROCEDURE — 700102 HCHG RX REV CODE 250 W/ 637 OVERRIDE(OP): Performed by: ORTHOPAEDIC SURGERY

## 2019-02-10 PROCEDURE — 700102 HCHG RX REV CODE 250 W/ 637 OVERRIDE(OP): Performed by: PHYSICIAN ASSISTANT

## 2019-02-10 PROCEDURE — 80048 BASIC METABOLIC PNL TOTAL CA: CPT

## 2019-02-10 PROCEDURE — A9270 NON-COVERED ITEM OR SERVICE: HCPCS | Performed by: PHYSICIAN ASSISTANT

## 2019-02-10 PROCEDURE — 36415 COLL VENOUS BLD VENIPUNCTURE: CPT

## 2019-02-10 RX ORDER — DIPHENHYDRAMINE HCL 25 MG
25 TABLET ORAL EVERY 6 HOURS PRN
Status: DISCONTINUED | OUTPATIENT
Start: 2019-02-10 | End: 2019-02-11 | Stop reason: HOSPADM

## 2019-02-10 RX ORDER — DIPHENHYDRAMINE HYDROCHLORIDE 50 MG/ML
25 INJECTION INTRAMUSCULAR; INTRAVENOUS EVERY 6 HOURS PRN
Status: DISCONTINUED | OUTPATIENT
Start: 2019-02-10 | End: 2019-02-10

## 2019-02-10 RX ADMIN — OMEPRAZOLE 20 MG: 20 CAPSULE, DELAYED RELEASE ORAL at 04:34

## 2019-02-10 RX ADMIN — CELECOXIB 200 MG: 200 CAPSULE ORAL at 04:34

## 2019-02-10 RX ADMIN — ASPIRIN 81 MG: 81 TABLET, COATED ORAL at 08:59

## 2019-02-10 RX ADMIN — ASPIRIN 81 MG: 81 TABLET, COATED ORAL at 21:30

## 2019-02-10 RX ADMIN — AMLODIPINE BESYLATE 2.5 MG: 5 TABLET ORAL at 04:34

## 2019-02-10 RX ADMIN — ACETAMINOPHEN 650 MG: 325 TABLET, FILM COATED ORAL at 15:50

## 2019-02-10 RX ADMIN — LOVASTATIN 80 MG: 20 TABLET ORAL at 17:07

## 2019-02-10 RX ADMIN — POLYETHYLENE GLYCOL 3350 1 PACKET: 17 POWDER, FOR SOLUTION ORAL at 08:59

## 2019-02-10 RX ADMIN — VITAMIN D, TAB 1000IU (100/BT) 1000 UNITS: 25 TAB at 04:34

## 2019-02-10 RX ADMIN — DIPHENHYDRAMINE HCL 25 MG: 25 TABLET ORAL at 04:46

## 2019-02-10 RX ADMIN — ACETAMINOPHEN 650 MG: 325 TABLET, FILM COATED ORAL at 08:59

## 2019-02-10 RX ADMIN — LOSARTAN POTASSIUM 100 MG: 50 TABLET ORAL at 04:34

## 2019-02-10 RX ADMIN — ACETAMINOPHEN 650 MG: 325 TABLET, FILM COATED ORAL at 03:28

## 2019-02-10 RX ADMIN — ACETAMINOPHEN 650 MG: 325 TABLET, FILM COATED ORAL at 21:30

## 2019-02-10 RX ADMIN — DOCUSATE SODIUM 100 MG: 100 CAPSULE, LIQUID FILLED ORAL at 17:07

## 2019-02-10 RX ADMIN — POTASSIUM CHLORIDE 20 MEQ: 1500 TABLET, EXTENDED RELEASE ORAL at 04:34

## 2019-02-10 RX ADMIN — OXYCODONE HYDROCHLORIDE 2.5 MG: 5 TABLET ORAL at 03:48

## 2019-02-10 RX ADMIN — DOCUSATE SODIUM 100 MG: 100 CAPSULE, LIQUID FILLED ORAL at 04:34

## 2019-02-10 NOTE — PROGRESS NOTES
No c/o  Having intermittent pain with mobilization, then altered LOC with small amount of oxycodone  Dressing dry  + DF/PF  AVSS  Continue minimal oxycodone, celebrex, tylenol  D/c planning

## 2019-02-10 NOTE — CARE PLAN
Problem: Safety  Goal: Will remain free from falls  Outcome: PROGRESSING AS EXPECTED  Reinforced use of call light when wanting to ambulate to bathroom     Problem: Knowledge Deficit  Goal: Knowledge of disease process/condition, treatment plan, diagnostic tests, and medications will improve    Intervention: Explain information regarding disease process/condition, treatment plan, diagnostic tests, and medications and document in education  Educated pt on SCDs function to help with circulation and prevention of blood clots

## 2019-02-10 NOTE — CARE PLAN
Problem: Safety  Goal: Will remain free from injury    Intervention: Provide assistance with mobility  Assisted pt with mobilization, ensured safety       Problem: Respiratory:  Goal: Respiratory status will improve    Intervention: Assess and monitor pulmonary status  Ensured  in place to monitor oxygenation

## 2019-02-10 NOTE — PROGRESS NOTES
"Discussed POC with ortho DYLAN Christiansen.     Per Kuldip, this RN should contact son for update on whether he will be able to be with pt 24/7, so that decision between discharge home and SNF may be made.    Also notified DYLAN Christiansen of pt change in mentation with oxy 2.5 and of request for alternate or additional pain management orders.      Called home number for son, Servando. Pt daughter, Marifer, answered the phone. Per Marifer, Servando is at work but will be taking 1 week vacation time and will be able to be with pt 24/7 during that time. Marifer is also here from [place] for at least a couple of weeks, \"or as long as it takes,\" and will be staying at Servadno's house to help care for pt.  "

## 2019-02-10 NOTE — PROGRESS NOTES
MD Dennis at bedside. Discussed pain management. Notified MD that pt requiring oxy 2.5 to control pain, which is affecting mentation. Requested to know if other pain management would be appropriate; MD states he will review.

## 2019-02-10 NOTE — THERAPY
"Physical Therapy Treatment completed.   Bed Mobility:  Supine to Sit: Moderate Assist  Transfers: Sit to Stand: Stand by Assist (cueing for precuations)  Gait: Level Of Assist: Supervised with 4-Wheel Walker       Plan of Care: Will benefit from Physical Therapy 3 times per week  Discharge Recommendations: Equipment: Will Continue to Assess for Equipment Needs. See below    Pt was able to demonstrate bed mobility with mod A, sit<>stands with SBA/VC, and ambulation with occasional SBA/VC. She did require physical assist with bed mobitliy though primary concerns are regarding pt's inabiltiy to effectively recall/implement posterior preucations with all activity despite active education/cueing throughout visit. She appears alert yet poor problem solving and impaired sequencing as well as aphasic/word salad throughout visit and query pt's current ability to effectively care for self (though would defer ADL/IADL recs in this regard to OT). In current condition with current cognition, she would require 24/7 Spv/assist 2' to safety concerns/cognitive concerns. If she is unable to obtain 24/7 during initial recovery, she would benefit from skilled PT/placement prior to dc to home as co-morbidites/cognition resolve. WIll continue to visit.     See \"Rehab Therapy-Acute\" Patient Summary Report for complete documentation.       "

## 2019-02-10 NOTE — PROGRESS NOTES
"After administration of oxycodone this afternoon, pt experiencing difficulty with words. For example, pt called her left hip her \"serna pin,\" before correcting herself, asking staff not to say the correct word, and eventually stating \"hip.\"     Pt continues to be A/O x 4, VSS, no signs of facial or motor asymmetry.   "

## 2019-02-11 VITALS
SYSTOLIC BLOOD PRESSURE: 131 MMHG | BODY MASS INDEX: 29.8 KG/M2 | HEIGHT: 68 IN | RESPIRATION RATE: 15 BRPM | HEART RATE: 88 BPM | WEIGHT: 196.65 LBS | DIASTOLIC BLOOD PRESSURE: 51 MMHG | TEMPERATURE: 98.3 F | OXYGEN SATURATION: 97 %

## 2019-02-11 PROBLEM — Z96.642 STATUS POST LEFT HIP REPLACEMENT: Status: ACTIVE | Noted: 2019-02-11

## 2019-02-11 PROBLEM — G89.18 POSTOPERATIVE PAIN: Status: ACTIVE | Noted: 2019-02-11

## 2019-02-11 PROCEDURE — 700102 HCHG RX REV CODE 250 W/ 637 OVERRIDE(OP): Performed by: PHYSICIAN ASSISTANT

## 2019-02-11 PROCEDURE — 97116 GAIT TRAINING THERAPY: CPT

## 2019-02-11 PROCEDURE — 700102 HCHG RX REV CODE 250 W/ 637 OVERRIDE(OP): Performed by: ORTHOPAEDIC SURGERY

## 2019-02-11 PROCEDURE — 700112 HCHG RX REV CODE 229: Performed by: ORTHOPAEDIC SURGERY

## 2019-02-11 PROCEDURE — 97530 THERAPEUTIC ACTIVITIES: CPT

## 2019-02-11 PROCEDURE — A9270 NON-COVERED ITEM OR SERVICE: HCPCS | Performed by: PHYSICIAN ASSISTANT

## 2019-02-11 PROCEDURE — A9270 NON-COVERED ITEM OR SERVICE: HCPCS | Performed by: ORTHOPAEDIC SURGERY

## 2019-02-11 PROCEDURE — 97535 SELF CARE MNGMENT TRAINING: CPT

## 2019-02-11 PROCEDURE — 99233 SBSQ HOSP IP/OBS HIGH 50: CPT | Performed by: INTERNAL MEDICINE

## 2019-02-11 RX ORDER — ASPIRIN 81 MG/1
81 TABLET ORAL 2 TIMES DAILY
Qty: 60 TAB | Refills: 0 | Status: ON HOLD | OUTPATIENT
Start: 2019-02-11 | End: 2021-02-28

## 2019-02-11 RX ORDER — OXYCODONE HYDROCHLORIDE 5 MG/1
2.5 TABLET ORAL EVERY 6 HOURS PRN
Qty: 30 TAB | Refills: 0 | Status: SHIPPED | OUTPATIENT
Start: 2019-02-11 | End: 2019-02-26

## 2019-02-11 RX ORDER — PSEUDOEPHEDRINE HCL 30 MG
100 TABLET ORAL 2 TIMES DAILY
Qty: 60 CAP | Refills: 0 | Status: SHIPPED | OUTPATIENT
Start: 2019-02-11 | End: 2021-06-21

## 2019-02-11 RX ORDER — OXYCODONE HYDROCHLORIDE 5 MG/1
2.5 TABLET ORAL EVERY 6 HOURS PRN
Qty: 30 TAB | Refills: 0 | Status: SHIPPED | OUTPATIENT
Start: 2019-02-11 | End: 2019-02-11

## 2019-02-11 RX ADMIN — ACETAMINOPHEN 650 MG: 325 TABLET, FILM COATED ORAL at 09:36

## 2019-02-11 RX ADMIN — OMEPRAZOLE 20 MG: 20 CAPSULE, DELAYED RELEASE ORAL at 06:08

## 2019-02-11 RX ADMIN — LOSARTAN POTASSIUM 100 MG: 50 TABLET ORAL at 06:07

## 2019-02-11 RX ADMIN — DOCUSATE SODIUM 100 MG: 100 CAPSULE, LIQUID FILLED ORAL at 06:07

## 2019-02-11 RX ADMIN — ASPIRIN 81 MG: 81 TABLET, COATED ORAL at 09:36

## 2019-02-11 RX ADMIN — POTASSIUM CHLORIDE 20 MEQ: 1500 TABLET, EXTENDED RELEASE ORAL at 06:07

## 2019-02-11 RX ADMIN — CELECOXIB 200 MG: 200 CAPSULE ORAL at 06:06

## 2019-02-11 RX ADMIN — VITAMIN D, TAB 1000IU (100/BT) 1000 UNITS: 25 TAB at 06:07

## 2019-02-11 RX ADMIN — ACETAMINOPHEN 650 MG: 325 TABLET, FILM COATED ORAL at 06:05

## 2019-02-11 RX ADMIN — AMLODIPINE BESYLATE 2.5 MG: 5 TABLET ORAL at 06:06

## 2019-02-11 ASSESSMENT — GAIT ASSESSMENTS
GAIT LEVEL OF ASSIST: SUPERVISED
ASSISTIVE DEVICE: FRONT WHEEL WALKER
DISTANCE (FEET): 200

## 2019-02-11 NOTE — PROGRESS NOTES
Discussed POC with ortho DYLAN Christiansen. Per Kuldip, once family arrives, we will have PT/OT discuss pt specific deficits and needs with family. If family still feels comfortable with pt discharging to home, will be done today.    Called son, Servando, to update on POC.

## 2019-02-11 NOTE — THERAPY
"Physical Therapy Treatment completed.   Bed Mobility:  Supine to Sit: Moderate Assist  Transfers: Sit to Stand: Supervised  Gait: Level Of Assist: Supervised with Front-Wheel Walker       Plan of Care: Will benefit from Physical Therapy 3 times per week  Discharge Recommendations: Equipment: Will Continue to Assess for Equipment Needs.       See \"Rehab Therapy-Acute\" Patient Summary Report for complete documentation.       "

## 2019-02-11 NOTE — PROGRESS NOTES
Discharge orders and prescriptions given to patient and family. Pt instructed to take Aspirin for DVT prophylaxis. Pt verbalized understanding of the orders. Will f/u with Dr. Dennis. No IV access at discharge. L hip incision dressing changed to silver mepilex per order from dejuan Christainsen. Family will take patient home. Pt has FWW for home. Pt discharged to home and escorted via wheelchair with all her personal belongings after all questions were answered.

## 2019-02-11 NOTE — DISCHARGE INSTRUCTIONS
*Follow up with Dr. Dennis in 10-14 days  *Weight bearing as tolerated                *Activity as tolerated  *Use assistive device for all activity  *Posterior hip precautions   *Continue exercises provided by physical therapy  *Elevate leg as needed  *Ice as needed (20 minutes every 1-2 hours)  *Leave dressing in place until follow-up visit  *Ok to shower with dressing covered  *No soaking of the incision; no baths, hot tubs, or swimming until cleared by doctor         *No driving until cleared by doctor  *Take medications as prescribed by doctor  *Call doctor’s office with any questions or concerns     Discharge Instructions    Discharged to home by car with relative. Discharged via wheelchair, hospital escort: Yes.  Special equipment needed: Walker    Be sure to schedule a follow-up appointment with your primary care doctor or any specialists as instructed.     Discharge Plan:   Diet Plan: Discussed  Activity Level: Discussed  Confirmed Follow up Appointment: Patient to Call and Schedule Appointment  Confirmed Symptoms Management: Discussed  Medication Reconciliation Updated: Yes  Pneumococcal Vaccine Administered/Refused: Not given - Patient refused pneumococcal vaccine  Influenza Vaccine Indication: Patient Refuses (received in 10/2018)    I understand that a diet low in cholesterol, fat, and sodium is recommended for good health. Unless I have been given specific instructions below for another diet, I accept this instruction as my diet prescription.   Other diet: regular    Special Instructions: Discharge instructions for the Orthopedic Patient    Follow up with Primary Care Physician within 2 weeks of discharge to home, regarding:  Review of medications and diagnostic testing.  Surveillance for medical complications.  Workup and treatment of osteoporosis, if appropriate.     -Is this a Joint Replacement patient? Yes   Total Joint Hip Replacement Discharge Instructions    Pain  - The goal is to slowly wean  off the prescription pain medicine.  - Ice can be used for pain control.  20 minutes at a time is recommended, and never directly against your skin or incision.  - Most patients are off the pain pills by 3 weeks; others may require a low level of pain medications for many months. If your pain continues to be severe, follow up with your physician.  Infection  Deep hip joint infections that require removal of the prostheses occur in less than 0.1% of patients. Lesser infections in the skin (cellulites) are more common and much more easily treated.  - Keep the incision as clean and dry as possible.  - Always wash your hands before touching your incision.  - Skin infections tend to develop around 7-10 days after surgery, most can be treated with oral antibiotics.  - Dental Care should be delayed for 3 months after surgery, your surgeon recommends taking a dose of antibiotics 1 hour prior to any dental procedure.  After 2 years, most surgeons recommend antibiotics only before an extensive procedure.  Ask your surgeon what he recommends.  - Signs and symptoms of infection can include:  low grade fever, redness, pain, swelling and drainage from your incision.  Notify your surgeon immediately if you develop any of these symptoms.  Post op Disturbances  - Bowel habits - constipation is extremely common and is caused by a combination of anesthesia, lack of mobility and pain medicine.  Use stool softeners or laxatives if necessary. It is important not to ignore this problem, as bowel obstructions can be a serious complication after joint replacement surgery.  - Mood/Energy Level - Many patients experience a lack of energy and endurance for up to 2-3 months after surgery.  Some may also feel down and can even become depressed.  This is likely due to the postoperative anemia, change in activity level, lack of sleep, pain medicine and just the emotional reaction to the surgery itself that is a big disruption in a person’s life.   This usually passes.  If symptoms persist, follow up with your primary physician.  - Returning to work - Your surgeon will give you more specific instructions.  Generally, if you work a sedentary job requiring little standing or walking, most patients may return within 2-6 weeks.  Manual labor jobs involving walking, lifting and standing may take 3-4 months.  Your surgeon’s office can provide a release to part-time or light duty work early on in your recovery and progress you to full duty as able.  - Driving - You can begin driving an automatic shift car in 4 to 8 weeks, provided you are no longer taking narcotic pain medication. If you have a stick-shift car and your right hip was replaced, do not begin driving until your doctor says you can.   - Avoiding falls -  throw rugs and tack down loose carpeting.  Be aware of floor hazards such as pets, small objects or uneven surfaces.   -  Airport Metal Detectors - The sensitivity of metal detectors varies and it is likely that your prosthesis will cause an alarm. Inform the  that you have an artificial joint.  Diet  - Resume your normal diet as tolerated.  - It is important to achieve a healthy nutritional status by eating a well balanced diet on a regular basis.  - Your physician may recommend that you take iron and vitamin supplements.   - Continue to drink plenty of fluids.  Shower/Bathing  - You may shower as soon as you get home from the hospital unless otherwise instructed.  - Keep your incision out of water.  To keep the incision dry when showering, cover it with a plastic bag or plastic wrap.  - Pat incision dry if it gets wet.  Don’t rub.  - Do not submerge in a bath until staples are out and the incision is completely healed. (Approximately 6-8 weeks after surgery).  Dressing Change:  Procedure (if recommended by your physician)  - Wash hands.  - Open all dressing change materials.  - Remove old dressing and discard.  - Inspect incision  for redness, increase in clear drainage, yellow/green drainage, odor and surrounding skin hot to touch.  -  ABD (large gauze) pad by one corner and lay over the incision.  Be careful not to touch the inside of the dressing that will lay over the incision.  - Secure in place as instructed (Ace wrap or tape).    Swelling/Bruising  - Swelling is normal after hip replacement and can involve the thigh, knee, calf and foot.  - Swelling can last from 3-6 months.  - Elevate your leg higher than your heart while reclining.  The first week you are home you should elevate your leg an equal amount of time, as you are active.    - Anti-inflammatory pills can be taken once you have stopped the blood thinners.  - The swelling is usually worse after you go home since you are upright for longer periods of time.  - Bruising is common and can involve the entire leg including the thigh, calf and even foot.  Bruising often does not appear until after you arrive home and it can be quite dramatic- purple, black, green.  The bruising you can see is not usually concerning and will subside without any treatment.      Blood Clot Prevention  Blood clots in the legs and the less common, but frightening, clots that travel to the lungs are a real focus of our preventative. Most patients are at standard risk for them, but those patients who are at higher risk include people who have had previous clots, a family history of clotting, smoking, diabetes, obesity, advanced age, use of estrogen and a sedentary lifestyle.    - Signs of blood clots in legs - Swelling in thigh, calf or ankle that does not go down with elevation.  Pain, heat and tenderness in calf, back of calf or groin area.  NOTE: blood clots can occur in either leg.  - You have been receiving anticoagulant therapy (blood thinners) in the hospital and you may be instructed to continue at home depending on your risk factors.  - Your risk for developing a clot continues for up to 2-3  months after surgery.  You should avoid prolonged sitting and dehydration during that time (long air trips and car trips).  If you do take a trip during this time, please get up and move around every 1- 1.5 hours.  - If you are prescribed blood thinning medication for home, follow instructions as directed. (Handouts provided if applicable).      Activity    Once you get home, you should stay active. The key is not to overdo it! While you can expect some good days and some bad days, you should notice a gradual improvement over time you should notice a gradual improvement and a gradual increase in your endurance over the next 6 to 12 months.    - Weight Bearing - If you have undergone cemented or hybrid hip replacement, you can put some weight on the leg immediately using a cane or walker, and you should continue to use some support for 4 to 6 weeks to help the muscles recover.   - Sleeping Positions - Sleep on your back with your legs slightly apart or on your side with a regular pillow between your knees. Be sure to use the pillow for at least 6 weeks, or until your doctor says you can do without it. Sleeping on your stomach should be all right  - Sitting - For at least the first 3 months, sit only in chairs that have arms. Do not sit on low chairs, low stools, or reclining chairs. Do not cross your legs at the knees. The physical therapist will show you how to sit and stand from a chair, keeping your affected leg out in front of you. Get up and move around on a regular basis--at least once every hour.  - Walking - Walk as much as you like once your doctor gives you the go-ahead, but remember that walking is no substitute for your prescribed exercises. Walking with a pair of trekking poles is helpful and adds as much as 40% to the exercise you get when you walk  - Therapy may be needed in some cases, to strengthen your muscles and improve your gait (walking pattern).  This decision will be made at your  post-operative appointment.  Follow your therapist recommended post-operative exercises (handout provided by Therapist).  - Swimming is also recommended; you can begin as soon as the sutures have been removed and the wound is healed, approximately 6 to 8 weeks after surgery. Using a pair of training fins may make swimming a more enjoyable and effective exercise.  - Other activities - Lower impact activities are preferred.  If you have specific questions, consult your Surgeon.    - Sexual activity - Your surgeon can tell you when it should be safe to resume sexual activity.      When to Call the Doctor   Call the physician if:   - Fever over 100.5? F  - Increased pain, drainage, redness, odor or heat around the incision area  - Shaking chills  - Increased knee pain with activity and rest  - Increased pain in calf, tenderness or redness above or below the knee  - Increased swelling of calf, ankle, foot  - Sudden increased shortness of breath, sudden onset of chest pain, localized chest pain with coughing  - Incision opening  Or, if there are any questions or concerns about medications or care.       -Is this patient being discharged with medication to prevent blood clots?  Yes, Aspirin Aspirin, ASA oral tablets  What is this medicine?  ASPIRIN (AS pir in) is a pain reliever. It is used to treat mild pain and fever. This medicine is also used as directed by a doctor to prevent and to treat heart attacks, to prevent strokes, and to treat arthritis or inflammation.  This medicine may be used for other purposes; ask your health care provider or pharmacist if you have questions.  COMMON BRAND NAME(S): Aspir-Low, Aspir-Aura, Aspirtab, Leslie Advanced Aspirin, Leslie Aspirin, Leslie Aspirin Extra Strength, Leslie Aspirin Plus, Leslie Extra Strength, Leslie Extra Strength Plus, Leslie Genuine Aspirin, Leslie Womens Aspirin, Bufferin, Bufferin Extra Strength, Bufferin Low Dose  What should I tell my health care provider before I take  this medicine?  They need to know if you have any of these conditions:  -anemia  -asthma  -bleeding problems  -child with chickenpox, the flu, or other viral infection  -diabetes  -gout  -if you frequently drink alcohol containing drinks  -kidney disease  -liver disease  -low level of vitamin K  -lupus  -smoke tobacco  -stomach ulcers or other problems  -an unusual or allergic reaction to aspirin, tartrazine dye, other medicines, dyes, or preservatives  -pregnant or trying to get pregnant  -breast-feeding  How should I use this medicine?  Take this medicine by mouth with a glass of water. Follow the directions on the package or prescription label. You can take this medicine with or without food. If it upsets your stomach, take it with food. Do not take your medicine more often than directed.  Talk to your pediatrician regarding the use of this medicine in children. While this drug may be prescribed for children as young as 12 years of age for selected conditions, precautions do apply. Children and teenagers should not use this medicine to treat chicken pox or flu symptoms unless directed by a doctor.  Patients over 65 years old may have a stronger reaction and need a smaller dose.  Overdosage: If you think you have taken too much of this medicine contact a poison control center or emergency room at once.  NOTE: This medicine is only for you. Do not share this medicine with others.  What if I miss a dose?  If you are taking this medicine on a regular schedule and miss a dose, take it as soon as you can. If it is almost time for your next dose, take only that dose. Do not take double or extra doses.  What may interact with this medicine?  Do not take this medicine with any of the following medications:  -cidofovir  -ketorolac  -probenecid  This medicine may also interact with the following medications:  -alcohol  -alendronate  -bismuth subsalicylate  -flavocoxid  -herbal supplements like feverfew, garlic, erin,  ginkgo biloba, horse chestnut  -medicines for diabetes or glaucoma like acetazolamide, methazolamide  -medicines for gout  -medicines that treat or prevent blood clots like enoxaparin, heparin, ticlopidine, warfarin  -other aspirin and aspirin-like medicines  -NSAIDs, medicines for pain and inflammation, like ibuprofen or naproxen  -pemetrexed  -sulfinpyrazone  -varicella live vaccine  This list may not describe all possible interactions. Give your health care provider a list of all the medicines, herbs, non-prescription drugs, or dietary supplements you use. Also tell them if you smoke, drink alcohol, or use illegal drugs. Some items may interact with your medicine.  What should I watch for while using this medicine?  If you are treating yourself for pain, tell your doctor or health care professional if the pain lasts more than 10 days, if it gets worse, or if there is a new or different kind of pain. Tell your doctor if you see redness or swelling. Also, check with your doctor if you have a fever that lasts for more than 3 days. Only take this medicine to prevent heart attacks or blood clotting if prescribed by your doctor or health care professional.  Do not take aspirin or aspirin-like medicines with this medicine. Too much aspirin can be dangerous. Always read the labels carefully.  This medicine can irritate your stomach or cause bleeding problems. Do not smoke cigarettes or drink alcohol while taking this medicine. Do not lie down for 30 minutes after taking this medicine to prevent irritation to your throat.  If you are scheduled for any medical or dental procedure, tell your healthcare provider that you are taking this medicine. You may need to stop taking this medicine before the procedure.  This medicine may be used to treat migraines. If you take migraine medicines for 10 or more days a month, your migraines may get worse. Keep a diary of headache days and medicine use. Contact your healthcare  professional if your migraine attacks occur more frequently.  What side effects may I notice from receiving this medicine?  Side effects that you should report to your doctor or health care professional as soon as possible:  -allergic reactions like skin rash, itching or hives, swelling of the face, lips, or tongue  -breathing problems  -changes in hearing, ringing in the ears  -confusion  -general ill feeling or flu-like symptoms  -pain on swallowing  -redness, blistering, peeling or loosening of the skin, including inside the mouth or nose  -signs and symptoms of bleeding such as bloody or black, tarry stools; red or dark-brown urine; spitting up blood or brown material that looks like coffee grounds; red spots on the skin; unusual bruising or bleeding from the eye, gums, or nose  -trouble passing urine or change in the amount of urine  -unusually weak or tired  -yellowing of the eyes or skin  Side effects that usually do not require medical attention (report to your doctor or health care professional if they continue or are bothersome):  -diarrhea or constipation  -headache  -nausea, vomiting  -stomach gas, heartburn  This list may not describe all possible side effects. Call your doctor for medical advice about side effects. You may report side effects to FDA at 4-869-FDA-9310.  Where should I keep my medicine?  Keep out of the reach of children.  Store at room temperature between 15 and 30 degrees C (59 and 86 degrees F). Protect from heat and moisture. Do not use this medicine if it has a strong vinegar smell. Throw away any unused medicine after the expiration date.  NOTE: This sheet is a summary. It may not cover all possible information. If you have questions about this medicine, talk to your doctor, pharmacist, or health care provider.  © 2018 [employer]/Gold Standard (2014-08-19 11:30:31)      · Is patient discharged on Warfarin / Coumadin?   No     Depression / Suicide Risk    As you are discharged from  this [facility] facility, it is important to learn how to keep safe from harming yourself.    Recognize the warning signs:  · Abrupt changes in personality, positive or negative- including increase in energy   · Giving away possessions  · Change in eating patterns- significant weight changes-  positive or negative  · Change in sleeping patterns- unable to sleep or sleeping all the time   · Unwillingness or inability to communicate  · Depression  · Unusual sadness, discouragement and loneliness  · Talk of wanting to die  · Neglect of personal appearance   · Rebelliousness- reckless behavior  · Withdrawal from people/activities they love  · Confusion- inability to concentrate     If you or a loved one observes any of these behaviors or has concerns about self-harm, here's what you can do:  · Talk about it- your feelings and reasons for harming yourself  · Remove any means that you might use to hurt yourself (examples: pills, rope, extension cords, firearm)  · Get professional help from the community (Mental Health, Substance Abuse, psychological counseling)  · Do not be alone:Call your Safe Contact- someone whom you trust who will be there for you.  · Call your local CRISIS HOTLINE 221-4773 or 049-010-1280  · Call your local Children's Mobile Crisis Response Team [place] (642) 795-0588 or www.Lookery  · Call the toll free National Suicide Prevention Hotlines   · National Suicide Prevention Lifeline 204-927-BIAK (8687)  · National Hope Line Network 800-SUICIDE (651-2274)

## 2019-02-11 NOTE — CARE PLAN
Problem: Safety  Goal: Will remain free from falls  Outcome: PROGRESSING AS EXPECTED  Educated on fall risk and ensured fall precautions in place. Bed in lowest position, treaded socks in place, call light in reach, bed alarm in place, room free of clutter, and proper assistance for patient to ambulate.    Problem: Pain Management  Goal: Pain level will decrease to patient's comfort goal  Outcome: PROGRESSING AS EXPECTED  Pt pain assessed and medicated as per MAR. Pt pain well controlled and meeting comfort goal of sleeping comfortably

## 2019-02-11 NOTE — DISCHARGE PLANNING
Anticipated Discharge Disposition: Home with help    Action: LSW met with the Pt and completed an assessment. Pt lives with her son who assists with transportation and anything else around the house per the Pt. Pt has a walker and is independent with her ADL's. Pt stated she is able to afford her medications.    Barriers to Discharge: unknown    Plan: unknown at this time      Care Transition Team Assessment    Information Source  Orientation : Oriented x 4  Information Given By: Patient  Informant's Name: Olesya Harmon  Who is responsible for making decisions for patient? : Patient      Elopement Risk  Legal Hold: No  Ambulatory or Self Mobile in Wheelchair: Yes  Disoriented: No  Psychiatric Symptoms: None  History of Wandering: No  Elopement this Admit: No  Vocalizing Wanting to Leave: No  Displays Behaviors, Body Language Wanting to Leave: No-Not at Risk for Elopement  Elopement Risk: Not at Risk for Elopement  Wanderguard On: Unavailable  Personal Belongings: Hospital Clothing Only    Interdisciplinary Discharge Planning  Lives with - Patient's Self Care Capacity: Adult Children  Patient or legal guardian wants to designate a caregiver (see row info): No  Housing / Facility: [facility]  Prior Services: None    Discharge Preparedness  What is your plan after discharge?: Home with help  What are your discharge supports?: Child  Prior Functional Level: Ambulatory, Uses Walker, Independent with Activities of Daily Living, Independent with Medication Management  Difficulity with ADLs: None  Difficulity with IADLs: Driving    Functional Assesment  Prior Functional Level: Ambulatory, Uses Walker, Independent with Activities of Daily Living, Independent with Medication Management    Finances  Financial Barriers to Discharge: No  Prescription Coverage: Yes    Vision / Hearing Impairment  Right Eye Vision: Impaired, Wears Glasses (only for reading)  Left Eye Vision: Impaired, Wears Glasses (only for reading)  Hearing  Impairment : Yes  Hearing Impairment: Both Ears, Hearing Device Not Available  Does Pt Need Special Equipment for the Hearing Impaired?: Yes-Needs for Facility to Arrange Equipment for the Hearing Impaired    Values / Beliefs / Concerns  Values / Beliefs Concerns : No  Spiritual Requests During Hospitalization: No         Domestic Abuse  Have you ever been the victim of abuse or violence?: No  Physical Abuse or Sexual Abuse: No  Verbal Abuse or Emotional Abuse: No  Possible Abuse Reported to:: Not Applicable    Psychological Assessment  History of Substance Abuse: None  History of Psychiatric Problems: No  Non-compliant with Treatment: No  Newly Diagnosed Illness: No    Discharge Risks or Barriers  Discharge risks or barriers?: No    Anticipated Discharge Information  Anticipated discharge disposition: Home  Discharge Address: [address]  Discharge Contact Phone Number: [phone]

## 2019-02-11 NOTE — CARE PLAN
Problem: Discharge Barriers/Planning  Goal: Patient's continuum of care needs will be met  Outcome: PROGRESSING AS EXPECTED  Family at bedside, PT/OT to work with pt and family and make final determination about d/c disposition.    Problem: Fluid Volume:  Goal: Will maintain balanced intake and output  Outcome: PROGRESSING AS EXPECTED  Tolerating PO intake and voiding with no difficulties.

## 2019-02-11 NOTE — PROGRESS NOTES
[facility] Geriatrics Consultation  Follow-up Progress Note      Date of consult: 2/11/2019  Requesting physician: Dr. Dion Dennis  Reason for consult: delirium  Chief complaint: delirium    Interval History/Subjective:    24 hour Events:   2 nights prior received oxy and diphenhydramine and became confused  Overnight, did ok  Son and DTR to stop by here and meet with PT/OT to make sure she is safe to go home    Nursing Report:   Did ok overnight    Patient Report:   Reports sleeping well and eating well.   Denies pain.    Reports recent BM.   Reports that she lives at home with son.   Son helps with driving and shopping.   She uses a walker at home and does some IADLs (dishes, laundry, bed making)    Family Report:   Spoke to son and DTR.   Report that mother became delirious after last hospital stay in 2018.   + speech diff (finding words) and seeing things.    Gradually improved.   Still has some diff expressing self.     Team Discussion:  Spoke to nurse, DYLAN Christiansen, pt and family     Medical Team:  Primary: Ortho  Consultants:   Geriatrics     Three Chronic Conditions:   HTN, stable  DL, stable  OA, stable    Past Medical History:  Past Medical History:   Diagnosis Date   • Arthritis     left hand   • Arthritis of left hip 10/8/2018   • CATARACT     joan surgery   • Essential hypertension 5/3/2018   • Heart murmur 10/8/2018   • High cholesterol    • Hypercholesterolemia 5/3/2018   • Hypertension    • Hypokalemia 5/3/2018   • Iron deficiency anemia due to chronic blood loss 5/3/2018   • Menopause 6/4/2018   • Nonrheumatic aortic valve insufficiency 10/8/2018   • Prediabetes 5/3/2018   • Sacroiliitis, not elsewhere classified (HCC) 6/15/2018    Injected 2017 by Dr. Zollinger       Past Surgical History:   Past Surgical History:   Procedure Laterality Date   • HIP ARTHROPLASTY TOTAL Left 2/7/2019    Procedure: HIP ARTHROPLASTY TOTAL;  Surgeon: Dion Dennis M.D.;  Location: SURGERY [facility];  Service:  Orthopedics   • HIP REVISION TOTAL Right 2/20/2018    Procedure: HIP REVISION TOTAL;  Surgeon: Dion Dennis M.D.;  Location: SURGERY [facility];  Service: Orthopedics   • JOINT INJECTION DIAGNOSTIC Right 1/16/2018    Procedure: JOINT INJECTION DIAGNOSTIC - FOR HIP ASPIRATION;  Surgeon: Dion Dennis M.D.;  Location: SURGERY [facility];  Service: Orthopedics   • CATARACT PHACO WITH IOL  5/9/2013    Performed by Haja Toussaint M.D. at SURGERY [facility] ORS   • HIP ARTHROPLASTY TOTAL  11/21/2011    Performed by ANH HIRSCH at SURGERY [facility] ORS   • CARPAL TUNNEL REL     • OTHER ORTHOPEDIC SURGERY  2009?    right knee replacement, Sylvie       Social History:   From [place]  Completed HS    3 children  Worked from age 30 to 62 -- diff to express what she did  Now living with son     Family History:  Family History   Problem Relation Age of Onset   • Other Mother         cause unknown   • Kidney Disease Father         went off dialysis       Allergies as of 12/10/2018 - Reviewed 11/01/2018   Allergen Reaction Noted   • Codeine Vomiting 12/30/2008       Current Facility-Administered Medications   Medication Dose Route Frequency Provider Last Rate Last Dose   • diphenhydrAMINE (BENADRYL) tablet/capsule 25 mg  25 mg Oral Q6HRS PRN Dustin Dean, P.A.-C.   25 mg at 02/10/19 0446   • celecoxib (CELEBREX) capsule 200 mg  200 mg Oral DAILY Geoff Joaquin P.A.-C.   200 mg at 02/11/19 0606   • amLODIPine (NORVASC) tablet 2.5 mg  2.5 mg Oral DAILY Dion Dennis M.D.   2.5 mg at 02/11/19 0606   • losartan (COZAAR) tablet 100 mg  100 mg Oral DAILY Dion Dennis M.D.   100 mg at 02/11/19 0607   • lovastatin (MEVACOR) tablet 80 mg  80 mg Oral Q EVENING Dion Dennis M.D.   80 mg at 02/10/19 1707   • omeprazole (PRILOSEC) capsule 20 mg  20 mg Oral DAILY Dion Dennis M.D.   20 mg at 02/11/19 0608   • potassium chloride SA (Kdur) tablet 20 mEq  20 mEq Oral DAILY Dion GIFFORD  DAREN Dennis   20 mEq at 02/11/19 0607   • vitamin D (cholecalciferol) tablet 1,000 Units  1,000 Units Oral DAILY Dion Dennis M.D.   1,000 Units at 02/11/19 0607   • Pharmacy Consult Request ...Pain Management Review 1 Each  1 Each Other PHARMACY TO DOSE Dion Dennis M.D.       • ondansetron (ZOFRAN) syringe/vial injection 4 mg  4 mg Intravenous Q4HRS PRN Dion Dennis M.D.       • docusate sodium (COLACE) capsule 100 mg  100 mg Oral BID Dion Dennis M.D.   100 mg at 02/11/19 0607   • senna-docusate (PERICOLACE or SENOKOT S) 8.6-50 MG per tablet 1 Tab  1 Tab Oral Nightly Dion Dennis M.D.   1 Tab at 02/09/19 2127   • senna-docusate (PERICOLACE or SENOKOT S) 8.6-50 MG per tablet 1 Tab  1 Tab Oral Q24HRS PRN Dion Dennis M.D.       • polyethylene glycol/lytes (MIRALAX) PACKET 1 Packet  1 Packet Oral BID PRN Dion Dennis M.D.   1 Packet at 02/10/19 0859   • magnesium hydroxide (MILK OF MAGNESIA) suspension 30 mL  30 mL Oral QDAY PRN Dion Dennis M.D.       • bisacodyl (DULCOLAX) suppository 10 mg  10 mg Rectal Q24HRS PRN Dion Dennis M.D.       • fleet enema 133 mL  1 Each Rectal Once PRN Dion Dennis M.D.       • aspirin EC (ECOTRIN) tablet 81 mg  81 mg Oral BID Dion Dennis M.D.   81 mg at 02/11/19 0936   • acetaminophen (TYLENOL) tablet 650 mg  650 mg Oral Q6HRS Dion Dennis M.D.   650 mg at 02/11/19 0936   • oxyCODONE immediate-release (ROXICODONE) tablet 2.5 mg  2.5 mg Oral Q3HRS PRN Dion Dennis M.D.   2.5 mg at 02/10/19 0348   • oxyCODONE immediate-release (ROXICODONE) tablet 5 mg  5 mg Oral Q3HRS PRN Dion Dennis M.D.           ROS:   Denies pain of any sort  Diff to obtain because of expressive aphasia    Physical Exam  Vitals:    02/10/19 1650 02/10/19 2049 02/11/19 0430 02/11/19 0800   BP: 127/52 115/53 127/53 131/51   Pulse: 82 85 82 88   Resp: 18 16 16 15   Temp: 36.8 °C (98.2 °F) 36.2 °C (97.2 °F) 36.2 °C (97.2 °F) 36.8 °C (98.3 °F)  "  TempSrc: Temporal Temporal Temporal Temporal   SpO2: 95% 94% 95% 97%   Weight:       Height:         Last BM: 2-11  Is and Os:    General:  Alert and oriented to person, place.  Diff to fully express purpose.  No apparent distress.    Eyes: Pupils equal and reactive to light. No scleral icterus. No conjunctival injection.      Ears:  [place]    ENMT: Moist mucous membranes. Oropharynx clear. No erythema or exudates noted.     Neck: Supple. Trachea midline.    Resp: Clear to auscultation bilaterally. No rales, rhonchi, or wheezes.    Cardiovascular: Regular rate and normal rhythm. No murmurs, rubs or gallops. 2+ radial pulses.     Abdomen:     Musculoskeletal: Wearing SCDs    Skin: Clear. No rash noted.    Lymph:     Neuro: Cranial nerves II through XII intact.  No dysmetria  Expressive aphasia  \"I got my hip\" (replaced)  \"I walk on a...thing in front of me\" (walker)  Thought it was Tues, Feb 12, 2019 and not Mon, Feb 11  Knew she was at [facility] in [place], [place]  Could talk about president (Raji) but not say his name  Could talk about WWII but could not state dates  Could say days of week backward and forward    Psych: Mood euthymic. Affect congruent.    Other:     Labs/Studies  crn 0.99  Pelvic Xray showed L hip replacement     CT head 2018     Impression       1.  No evidence of acute intracranial process.    2.  Cerebral atrophy as well as periventricular chronic small vessel ischemic change.       Assessment and Plan    Delirium   Suspect that delirium is related to recent surgery as well as medications (e.g., scopalamine and narcotics)  Seems to be clearing with avoiding of centrally acting medications  Agree with Tylenol around the clock for pain   Avoid narcotics  Would stop diphenhydramine PRN insomnia    Interventions to be considered in all patients in order to minimize the risk of delirium.   -do not disturb patient (vitals or lab draws) between the hours of 10 PM and 6 AM if medically feasible.  -ideally the " patient should not sleep during the day and we should avoid day time naps.   -up in chair for meals  -ambulate or up in chair at least three times daily, as able  -watch for constipation  -timed voiding - ask patient is she would like to go to the bathroom q 2-3 hours, except during the do not disturb hours.   -remove all unnecessary lines (central lines, peripheral IVs, feeding tubes, chowdhury catheters)  -would recommend against use of restraints - either chemical (antipsychotics) or physical - unless patient is a danger to self or others  -minimize polypharmacy; if possible, medications should not be dosed during sleep hours    Expressive aphasia  Unclear etiology at this time; rule out vascular disease; query primary progressive aphasia???  Per son and DTR, this has been an issue in last year or more  Denied other cognitive issues but would want to meet with family more to confirm (conversation with son and DTR was on the shorter side; spoke on phone with son and DTR who wanted to leave for the hospital)  Patient had a CT head non contrast when this started which was unremarkable except for microvascular changes  Son reports that pt and family would be interested in figuring out etiology  Consider further evaluation as an outpatient (e.g, possibly, brain MRI with and without contrast, TSH, b12, folic acid, and RPR, neurocognitive testing, and/or referral to neurology and/or geriatrics)     Status post left total hip replacement   Agree with pain medications that are not centrally acting (e.g., Tyelnol around the clock)  Agree with avoid centrally acting pain medications (e.g., narcotics)  Patient's family and therapists to meet later today to confirm she is safe to go home versus go to rehab     Thank you for this consult.  Please call with any questions or concerns.     We will continue to follow that patient along with you.    I dedicated over 45 minutes to the patient encounter of which over 50% of time was in  counseling and/or care coordination with the patient, family, friends, and/or healthcare staff.  Topics included delirium, meds, diff expressing words.  Spoke to pt, nurse, family and PA.     Karie Meredith M.D.  Geriatrics- [facility]  Please feel free to contact me with any questions.  Extension [phone]   8:00-5:00 Monday - Friday (excluding holidays)

## 2019-02-11 NOTE — PROGRESS NOTES
Family at bedside. Notified OT Crystal, who states she will also notify PT Suzanne.      1300 Update: per Suzanne and Crystal, pt is fine to go home with family under their 24/7 care. DYLAN Christiansen notified.

## 2019-02-11 NOTE — THERAPY
"Occupational Therapy Treatment completed with focus on ADLs, ADL transfers, patient education and caregiver training.  Functional Status:  Pt sitting up in chair on arrival.  Son & daughter present.  Pt & family educated on Post Hip precautions during ADL's & provided with a written handout.  Family reports pt has LH reacher, sock aid, RTS & shower chair with Hand held shower & grab bars for home use.  Pt was able to dress LE with Min A from pt's daughter.  Daughter will be with pt for 24/7 & son also has this week off work to assist as needed.  Pt was supervised for toilet transfer using RTS.  Pt able to groom standing at sink with supervision.  Pt had no LOB during tx session.  Plan of Care: Patient with no further skilled OT needs in the acute care setting at this time  Discharge Recommendations:  Equipment No Equipment Needed, pt has all necessary AE for home use in place. Post-acute therapy Discharge to home with outpatient or home health for additional skilled therapy services.    See \"Rehab Therapy-Acute\" Patient Summary Report for complete documentation.   "

## 2019-02-11 NOTE — DISCHARGE SUMMARY
DISCHARGE SUMMARY    PATIENTS NAME: Olesya Harmon    MRN: [id number]  [facility]: [id number]    ADMIT DATE:  2/7/2019  ADMIT MD: Dion Dennis M.D.    DISCHARGE DATE: 2/11/2019  DISCHARGE DIAGNOSIS:Status post left total hip arthroplasty  DISCHARGE MD: Dion Dennis M.D.    REASON FOR ADMISSION: Severe degenerative joint disease, left hip    PRINCIPAL DIAGNOSIS:Left hip arthritis    SECONDARY DIAGNOSIS:none      PROCEDURES: left total hip arthroplasty, 2/7/2019,  Dion Dennis M.D.     CONSULTATIONS: Dion Dennis M.D.     HOSPITAL COURSE: Patient is a pleasant 78 year old female who was initially seen by Dr. Dennis in clinic. After failing conservative medical management the patient and surgeon agreen she was a good candidate for surgical intervention.  After explaining the indications, risks, benefits, and alternatives the patient wished to proceed with surgery. The patient was admitted and taken to the OR for the above mentioned procedure.  There were no complications and minimal blood loss. Hospital stay was prolonged by episodic delirium and hypotension, which resolved. Geriatrics was consulted, and are happy to follow her as outpatient. Olesya Harmon has done well with mobilization and pain has been well controlled with oral medications. Wound care instructions were given, patient's questions answered, and Olesya Harmon is ready for discharge to home with family at this time.     DISCHARGE LOCATION: [place], [place]    DVT PROPHYLAXIS:aspirin  ANTIBIOTICS:completed  MEDICATIONS:   Current Outpatient Prescriptions   Medication Sig Dispense Refill   • oxyCODONE immediate-release (ROXICODONE) 5 MG Tab Take 0.5 Tabs by mouth every 6 hours as needed for up to 15 days. 30 Tab 0   • aspirin EC 81 MG EC tablet Take 1 Tab by mouth 2 times a day. 60 Tab 0   • docusate sodium 100 MG Cap Take 100 mg by mouth 2 Times a Day. 60 Cap 0   She may resume her preoperative medicines as  directed by primary care.    WEIGHT BEARING STATUS:as tolerated    FOLLOW UP: 10-14 days post operatively with Dr Dion Dennis M.D.

## 2019-02-12 ENCOUNTER — PATIENT OUTREACH (OUTPATIENT)
Dept: HEALTH INFORMATION MANAGEMENT | Facility: OTHER | Age: 79
End: 2019-02-12

## 2019-03-07 ENCOUNTER — APPOINTMENT (OUTPATIENT)
Dept: MEDICAL GROUP | Facility: MEDICAL CENTER | Age: 79
End: 2019-03-07
Payer: MEDICARE

## 2019-03-13 ENCOUNTER — PATIENT OUTREACH (OUTPATIENT)
Dept: HEALTH INFORMATION MANAGEMENT | Facility: OTHER | Age: 79
End: 2019-03-13

## 2019-03-19 NOTE — PROGRESS NOTES
Patient Olesya Harmon was an elective admission to [facility] on 2/7/19 for hip arthroplasty total. The patient was discharged on 2/11/19 and instructed to follow up with her PCP and Orthopaedic Surgeon.  The patient successfully filled her discharge medications.  The patient followed up with her Orthopaedic Surgeon on 2/20/19. The patient was scheduled to follow up with her PCP on 3/7/19, however, the patient cancelled this appointment and did not reschedule.  The patient has 1 future appointment with her Orthopaedic Surgeon on 3/15/19.  Low LACE score - No PPS conducted.

## 2019-05-01 ENCOUNTER — HOSPITAL ENCOUNTER (OUTPATIENT)
Dept: LAB | Facility: MEDICAL CENTER | Age: 79
End: 2019-05-01
Attending: ORTHOPAEDIC SURGERY
Payer: MEDICARE

## 2019-05-01 PROCEDURE — 36415 COLL VENOUS BLD VENIPUNCTURE: CPT

## 2019-05-01 PROCEDURE — 83018 HEAVY METAL QUAN EACH NES: CPT

## 2019-05-01 PROCEDURE — 82495 ASSAY OF CHROMIUM: CPT

## 2019-05-04 LAB
CR SERPL-MCNC: 2 UG/L
TEST NAME 95000: NORMAL

## 2019-05-27 RX ORDER — POTASSIUM CHLORIDE 20 MEQ/1
TABLET, EXTENDED RELEASE ORAL
Qty: 90 TAB | Refills: 3 | Status: SHIPPED | OUTPATIENT
Start: 2019-05-27 | End: 2020-08-14

## 2019-05-27 RX ORDER — AMLODIPINE BESYLATE 2.5 MG/1
TABLET ORAL
Qty: 90 TAB | Refills: 3 | Status: SHIPPED | OUTPATIENT
Start: 2019-05-27 | End: 2020-08-14

## 2019-05-27 NOTE — TELEPHONE ENCOUNTER
Was the patient seen in the last year in this department? Yes lov 11/1/19    Does patient have an active prescription for medications requested? No     Received Request Via: Pharmacy

## 2019-07-22 DIAGNOSIS — I10 ESSENTIAL HYPERTENSION: ICD-10-CM

## 2019-07-22 RX ORDER — LOSARTAN POTASSIUM 100 MG/1
TABLET ORAL
Qty: 90 TAB | Refills: 3 | Status: SHIPPED | OUTPATIENT
Start: 2019-07-22 | End: 2019-12-30 | Stop reason: SDUPTHER

## 2019-08-21 DIAGNOSIS — K21.9 GASTROESOPHAGEAL REFLUX DISEASE WITHOUT ESOPHAGITIS: ICD-10-CM

## 2019-08-21 RX ORDER — PANTOPRAZOLE SODIUM 40 MG/1
TABLET, DELAYED RELEASE ORAL
Qty: 90 TAB | Refills: 3 | Status: SHIPPED | OUTPATIENT
Start: 2019-08-21 | End: 2020-08-14

## 2019-08-25 ENCOUNTER — HOSPITAL ENCOUNTER (EMERGENCY)
Facility: MEDICAL CENTER | Age: 79
End: 2019-08-25
Attending: EMERGENCY MEDICINE
Payer: MEDICARE

## 2019-08-25 VITALS
SYSTOLIC BLOOD PRESSURE: 176 MMHG | BODY MASS INDEX: 28.4 KG/M2 | WEIGHT: 187.39 LBS | HEART RATE: 88 BPM | TEMPERATURE: 97.6 F | OXYGEN SATURATION: 99 % | HEIGHT: 68 IN | DIASTOLIC BLOOD PRESSURE: 88 MMHG

## 2019-08-25 DIAGNOSIS — R13.19 ESOPHAGEAL DYSPHAGIA: ICD-10-CM

## 2019-08-25 DIAGNOSIS — R60.0 BILATERAL LEG EDEMA: ICD-10-CM

## 2019-08-25 PROCEDURE — 99283 EMERGENCY DEPT VISIT LOW MDM: CPT

## 2019-08-25 NOTE — ED TRIAGE NOTES
Pt states that this morning she noticed difficulty swallowing. She states that she is unable to swallow saliva. Upon assessment, pt able to maintain secretions. Pt states that when she went to sleep she was fine, but noticed the difficulty swallowing this morning. She denies eating today.

## 2019-08-25 NOTE — ED NOTES
MD at bedside pt. Was given apple juice that she tolerated well, she was also given apple sause and was able to tolerated as well, MD explained to pt. That she may need a scope, he also explained that she needs to follow up with her pcp and cardiologist, pt. States understanding

## 2019-08-25 NOTE — ED PROVIDER NOTES
ED Provider Note    CHIEF COMPLAINT  Chief Complaint   Patient presents with   • Dysphagia       HPI  Olesya Harmon is a 78 y.o. female who presents with a report that she woke up this morning and felt like she was having trouble swallowing her own saliva.  She did not start drooling or having any difficulty breathing and denies any cough, congestion, fever, chills, sweats.  Does not feel like she has anything stuck in her throat when she swallows it feels funny just above the jugular notch.  She has not tried anything except a little water this morning which seemed to go down with a little difficulty.  She has not had anything to eat since last night when she ate pizza which seem to have no issue going down.  She is never had any trouble with an esophageal issue or stricture.  She cannot recall who performed her colonoscopy years ago.  Denies any other complaints    REVIEW OF SYSTEMS  See HPI for further details. All other systems are negative.     PAST MEDICAL HISTORY  Past Medical History:   Diagnosis Date   • Arthritis     left hand   • Arthritis of left hip 10/8/2018   • CATARACT     joan surgery   • Essential hypertension 5/3/2018   • Heart murmur 10/8/2018   • High cholesterol    • Hypercholesterolemia 5/3/2018   • Hypertension    • Hypokalemia 5/3/2018   • Iron deficiency anemia due to chronic blood loss 5/3/2018   • Menopause 6/4/2018   • Nonrheumatic aortic valve insufficiency 10/8/2018   • Prediabetes 5/3/2018   • Sacroiliitis, not elsewhere classified (HCC) 6/15/2018    Injected 2017 by Dr. Zollinger       FAMILY HISTORY  Family History   Problem Relation Age of Onset   • Other Mother         cause unknown   • Kidney Disease Father         went off dialysis       SOCIAL HISTORY   reports that she has never smoked. She has never used smokeless tobacco. She reports that she does not drink alcohol or use drugs.    SURGICAL HISTORY  Past Surgical History:   Procedure Laterality Date   • HIP ARTHROPLASTY  "TOTAL Left 2/7/2019    Procedure: HIP ARTHROPLASTY TOTAL;  Surgeon: Dion Dennis M.D.;  Location: SURGERY [facility];  Service: Orthopedics   • HIP REVISION TOTAL Right 2/20/2018    Procedure: HIP REVISION TOTAL;  Surgeon: Dion Dennis M.D.;  Location: SURGERY [facility];  Service: Orthopedics   • JOINT INJECTION DIAGNOSTIC Right 1/16/2018    Procedure: JOINT INJECTION DIAGNOSTIC - FOR HIP ASPIRATION;  Surgeon: Dion Dennis M.D.;  Location: SURGERY [facility];  Service: Orthopedics   • CATARACT PHACO WITH IOL  5/9/2013    Performed by Haja Toussaint M.D. at [facility] ORS   • HIP ARTHROPLASTY TOTAL  11/21/2011    Performed by ANH HIRSCH at [facility] ORS   • CARPAL TUNNEL REL     • OTHER ORTHOPEDIC SURGERY  2009?    right knee replacement, Sylvie       CURRENT MEDICATIONS  Home Medications    **Home medications have not yet been reviewed for this encounter**         ALLERGIES  Allergies   Allergen Reactions   • Codeine Vomiting       PHYSICAL EXAM  VITAL SIGNS: BP (!) 176/88   Pulse 88   Temp 36.4 °C (97.6 °F) (Oral)   Ht 1.727 m (5' 8\")   Wt 85 kg (187 lb 6.3 oz)   SpO2 99%   BMI 28.49 kg/m²    Constitutional: Well developed, Well nourished, No acute distress, Non-toxic appearance.   HENT: Normocephalic, Atraumatic, Bilateral external ears normal, Oropharynx is clear mucous membranes are moist. No oral exudates or nasal discharge.   Eyes: Pupils are equal round and reactive, EOMI, Conjunctiva normal, No discharge.   Neck: Normal range of motion, No tenderness, Supple, No stridor. No meningismus.  No tenderness or fullness about the para esophageal or paratracheal region  Lymphatic: No lymphadenopathy noted.   Cardiovascular: Regular rate and rhythm with 3 out of 6 holosystolic ejection murmur left sternal border.  Thorax & Lungs: Clear breath sounds bilaterally without wheezes, rhonchi or rales. There is no chest wall tenderness.   Abdomen: Soft " non-tender non-distended. There is no rebound or guarding. No organomegaly is appreciated. Bowel sounds are normal.  Skin: Normal without rash.   Back: No CVA or spinal tenderness.   Extremities: Intact distal pulses, bilateral lower extremity edema right greater than left, No tenderness, No cyanosis, No clubbing. Capillary refill is less than 2 seconds.  Negative Homans sign bilaterally  Musculoskeletal: Good range of motion in all major joints. No tenderness to palpation or major deformities noted.   Neurologic: Alert & oriented x 3, Normal motor function, Normal sensory function, No focal deficits noted. Reflexes are normal.  Psychiatric: Affect normal, Judgment normal, Mood normal. There is no suicidal ideation or patient reported hallucinations.       COURSE & MEDICAL DECISION MAKING  Pertinent Labs & Imaging studies reviewed. (See chart for details)  I spent a significant amount of time at the bedside obtaining history and the nuances thereof.  She describes that she had trouble with her saliva but did not have any drooling and was able to swallow in the emergency department saying that I felt a little funny above her jugular notch.  We gave her some apple juice and she swallowed that without issue stating the same thing that it feels a little funny.  She then took some applesauce without issue.  The patient may have an esophageal stricture this developed or has scratch the inside of her esophagus to the point where she has some irritation in that region when she swallows.  I feel it is important for her to be on a soft diet until she sees her primary care doctor and she possibly will need endoscopy by a gastroenterologist.  She is asked to schedule an appointment with them in case she does not have improvement in the next few days.  She is discharged in stable condition will return if any significant change in symptoms such as unable to swallow, drooling, vomiting    Additionally I spoke with the patient  about her bilateral leg swelling and her murmur which I believe is mitral regurgitation.  She would benefit from echo as an outpatient and modification of therapy there of    FINAL IMPRESSION  1. Esophageal dysphagia    2. Bilateral leg edema             Electronically signed by: Danial Yi, 8/25/2019 6:13 AM

## 2019-09-04 ENCOUNTER — OFFICE VISIT (OUTPATIENT)
Dept: MEDICAL GROUP | Facility: MEDICAL CENTER | Age: 79
End: 2019-09-04
Payer: MEDICARE

## 2019-09-04 VITALS
RESPIRATION RATE: 14 BRPM | OXYGEN SATURATION: 96 % | HEART RATE: 90 BPM | DIASTOLIC BLOOD PRESSURE: 80 MMHG | HEIGHT: 68 IN | WEIGHT: 188.05 LBS | BODY MASS INDEX: 28.5 KG/M2 | TEMPERATURE: 98.2 F | SYSTOLIC BLOOD PRESSURE: 146 MMHG

## 2019-09-04 DIAGNOSIS — I10 ESSENTIAL HYPERTENSION: ICD-10-CM

## 2019-09-04 DIAGNOSIS — E78.00 HYPERCHOLESTEROLEMIA: ICD-10-CM

## 2019-09-04 DIAGNOSIS — K21.9 GASTROESOPHAGEAL REFLUX DISEASE WITHOUT ESOPHAGITIS: ICD-10-CM

## 2019-09-04 PROBLEM — M16.12 ARTHRITIS OF LEFT HIP: Status: RESOLVED | Noted: 2018-10-08 | Resolved: 2019-09-04

## 2019-09-04 PROCEDURE — 99214 OFFICE O/P EST MOD 30 MIN: CPT | Performed by: FAMILY MEDICINE

## 2019-09-04 PROCEDURE — 8041 PR SCP AHA: Performed by: FAMILY MEDICINE

## 2019-09-04 RX ORDER — TRIAMTERENE AND HYDROCHLOROTHIAZIDE 75; 50 MG/1; MG/1
1 TABLET ORAL DAILY
Qty: 90 TAB | Refills: 3 | Status: SHIPPED
Start: 2019-09-04 | End: 2020-02-03

## 2019-09-04 ASSESSMENT — PATIENT HEALTH QUESTIONNAIRE - PHQ9: CLINICAL INTERPRETATION OF PHQ2 SCORE: 0

## 2019-09-04 NOTE — ASSESSMENT & PLAN NOTE
This is a chronic health problem for which the patient does need to do some blood work and see me back.  We will plan to see her back in 4 weeks with blood work drawn prior to the visit.

## 2019-09-04 NOTE — PROGRESS NOTES
Annual Health Assessment Questions:    1.  Are you currently engaging in any exercise or physical activity? Yes    2.  How would you describe your mood or emotional well-being today? good    3.  Have you had any falls in the last year? No    4.  Have you noticed any problems with your balance or had difficulty walking? No    5.  In the last six months have you experienced any leakage of urine? Yes    6. DPA/Advanced Directive: Patient has Living Will, but it is not on file. Instructed to bring in a copy to scan into their chart.   CC:Diagnoses of Essential hypertension, Gastroesophageal reflux disease without esophagitis, and Hypercholesterolemia were pertinent to this visit.    HISTORY OF PRESENT ILLNESS: Patient is a 78 y.o. female established patient who presents today to talk about her recent ER visit which occurred on 8/25/2019.  At that time it was thought that she was having problems with dysphasia and not being able to manage or swallow her own saliva.  In talking with her she states that she is had no further problems with this.  She does bring up the fact that her blood pressure is not adequately controlled and that she is having peripheral edema which evidently has been going on since she had her hips replaced in February 2019.  Patient also seems to be easily confused and has difficulty with expressive aphasia.  She states she is been like this since she had that surgery.  I do not show anything in the chart that indicates that she had any type of neurological event associated with the hip replacement.    Health Maintenance: Completed    Essential hypertension  This is a chronic health problem that is uncontrolled with current medications and lifestyle measures.  Her blood pressure today is 146/80 and when she was seen in the emergency it was also elevated at 176/88 with a pulse of 88.  She has peripheral edema in her ankles and feet.  She has recently had bilateral hip replacements which is probably  contributing to that the edema.  I would like to add in triamterene/hydrochlorothiazide 37.5/25 to see if we can get her blood pressure under control and get the swelling down in her feet.    Gastroesophageal reflux disease without esophagitis  This is a chronic health problem for this patient that is adequately controlled with generic Protonix.  Patient had an episode of dysphasia that took her to the emergency room on 8/25/2019.  She states since that time she is done well and has not had any further problems with food getting stuck or feeling like she cannot swallow her own saliva.  For now are going to postpone seeing a gastroenterologist.    Hypercholesterolemia  This is a chronic health problem for which the patient does need to do some blood work and see me back.  We will plan to see her back in 4 weeks with blood work drawn prior to the visit.      Patient Active Problem List    Diagnosis Date Noted   • Postoperative pain 02/11/2019   • Status post left hip replacement 02/11/2019   • Nonrheumatic aortic valve insufficiency 10/08/2018   • Gastroesophageal reflux disease without esophagitis 06/04/2018   • Menopause 06/04/2018   • Essential hypertension 05/03/2018   • Hypercholesterolemia 05/03/2018   • Prediabetes 05/03/2018   • Obesity (BMI 30-39.9) 05/03/2018      Allergies:Codeine    Current Outpatient Medications   Medication Sig Dispense Refill   • triamterene-hydrochlorothiazide (MAXZIDE 75-50) 75-50 MG Tab Take 1 Tab by mouth every day. 90 Tab 3   • pantoprazole (PROTONIX) 40 MG Tablet Delayed Response TAKE 1 TABLET BY MOUTH ONCE DAILY 90 Tab 3   • losartan (COZAAR) 100 MG Tab TAKE 1 TABLET BY MOUTH ONCE DAILY 90 Tab 3   • potassium chloride SA (KDUR) 20 MEQ Tab CR TAKE 1 TABLET BY MOUTH ONCE DAILY 90 Tab 3   • amLODIPine (NORVASC) 2.5 MG Tab TAKE 1 TABLET BY MOUTH ONCE DAILY 90 Tab 3   • aspirin EC 81 MG EC tablet Take 1 Tab by mouth 2 times a day. 60 Tab 0   • docusate sodium 100 MG Cap Take 100 mg by  mouth 2 Times a Day. 60 Cap 0   • acetaminophen (TYLENOL) 500 MG Tab Take 500 mg by mouth Once.     • gabapentin (NEURONTIN) 600 MG tablet Take 600 mg by mouth Once.     • celecoxib (CELEBREX) 200 MG Cap Take 200 mg by mouth Once.     • lovastatin (MEVACOR) 40 MG tablet Take 2 Tabs by mouth every evening. 180 Tab 3   • vitamin D (CHOLECALCIFEROL) 1000 UNIT Tab Take 1,000 Units by mouth every day.     • Garlic 1000 MG Cap Take 1 Cap by mouth every day.       No current facility-administered medications for this visit.        Social History     Tobacco Use   • Smoking status: Never Smoker   • Smokeless tobacco: Never Used   Substance Use Topics   • Alcohol use: No   • Drug use: No     Social History     Social History Narrative   • Not on file       Family History   Problem Relation Age of Onset   • Other Mother         cause unknown   • Kidney Disease Father         went off dialysis        ROS:     - Constitutional:  Negative for fever, chills, unexpected weight change, and fatigue/generalized weakness.    - HEENT:  Negative for headaches, vision changes, hearing changes, ear pain, ear discharge, rhinorrhea, sinus congestion, sore throat, and neck pain.      - Respiratory: Negative for cough, sputum production, chest congestion, dyspnea, wheezing, and crackles.      - Cardiovascular: Negative for chest pain, palpitations, orthopnea, and bilateral lower extremity edema.     - Gastrointestinal: Negative for heartburn, nausea, vomiting, abdominal pain, hematochezia, melena, diarrhea, constipation, and greasy/foul-smelling stools.     - Genitourinary: Negative for dysuria, polyuria, hematuria, pyuria, urinary urgency, and urinary incontinence.     - Musculoskeletal: Negative for myalgias, back pain, and joint pain.     - Skin: Negative for rash, itching, cyanotic skin color change.     - Neurological: Negative for dizziness, tingling, tremors, focal sensory deficit, focal weakness and headaches.     -  "Endo/Heme/Allergies: Does not bruise/bleed easily.     - Psychiatric/Behavioral: Positive for mild cognitive impairment but denies depression, suicidal or homicidal ideation.           - NOTE: All other systems reviewed and are negative, except as in HPI.      Exam:    /80 (BP Location: Left arm, Patient Position: Sitting, BP Cuff Size: Adult)   Pulse 90   Temp 36.8 °C (98.2 °F) (Temporal)   Resp 14   Ht 1.727 m (5' 8\")   Wt 85.3 kg (188 lb 0.8 oz)   SpO2 96%  Body mass index is 28.59 kg/m².    General:  Well nourished, well developed female in NAD  Head is grossly normal.  Neck: Supple without JVD or bruit. Thyroid is not enlarged.  Pulmonary: Clear to ausculation and percussion.  Normal effort. No rales, ronchi, or wheezing.  Cardiovascular: Regular rate and rhythm without murmur. Carotid and radial pulses are intact and equal bilaterally.  Extremities: 1+ pitting edema to the mid tibia bilaterally.    Please note that this dictation was created using voice recognition software. I have made every reasonable attempt to correct obvious errors, but I expect that there are errors of grammar and possibly content that I did not discover before finalizing the note.    Assessment/Plan:  1. Essential hypertension  Uncontrolled, will add triamterene/hydrochlorothiazide 75/25 and recheck in 4 weeks.  Hopefully this will help both with her peripheral edema and her blood pressure control.    2. Gastroesophageal reflux disease without esophagitis  Adequately controlled with current medication.  Patient does not need to go to gastroenterology.    3. Hypercholesterolemia  Unknown control, we need to do blood work because patient has not done any since early 2018.  We will make certain that her lipids are adequately controlled.  - Comp Metabolic Panel; Future  - Lipid Profile; Future           "

## 2019-09-04 NOTE — ASSESSMENT & PLAN NOTE
This is a chronic health problem that is uncontrolled with current medications and lifestyle measures.  Her blood pressure today is 146/80 and when she was seen in the emergency it was also elevated at 176/88 with a pulse of 88.  She has peripheral edema in her ankles and feet.  She has recently had bilateral hip replacements which is probably contributing to that the edema.  I would like to add in triamterene/hydrochlorothiazide 37.5/25 to see if we can get her blood pressure under control and get the swelling down in her feet.

## 2019-09-04 NOTE — ASSESSMENT & PLAN NOTE
This is a chronic health problem for this patient that is adequately controlled with generic Protonix.  Patient had an episode of dysphasia that took her to the emergency room on 8/25/2019.  She states since that time she is done well and has not had any further problems with food getting stuck or feeling like she cannot swallow her own saliva.  For now are going to postpone seeing a gastroenterologist.

## 2019-09-25 ENCOUNTER — TELEPHONE (OUTPATIENT)
Dept: MEDICAL GROUP | Facility: MEDICAL CENTER | Age: 79
End: 2019-09-25

## 2019-09-25 ENCOUNTER — HOSPITAL ENCOUNTER (OUTPATIENT)
Dept: LAB | Facility: MEDICAL CENTER | Age: 79
End: 2019-09-25
Attending: FAMILY MEDICINE
Payer: MEDICARE

## 2019-09-25 DIAGNOSIS — E78.00 HYPERCHOLESTEROLEMIA: ICD-10-CM

## 2019-09-25 LAB
ALBUMIN SERPL BCP-MCNC: 4.3 G/DL (ref 3.2–4.9)
ALBUMIN/GLOB SERPL: 1.8 G/DL
ALP SERPL-CCNC: 84 U/L (ref 30–99)
ALT SERPL-CCNC: 7 U/L (ref 2–50)
ANION GAP SERPL CALC-SCNC: 7 MMOL/L (ref 0–11.9)
AST SERPL-CCNC: 16 U/L (ref 12–45)
BILIRUB SERPL-MCNC: 0.9 MG/DL (ref 0.1–1.5)
BUN SERPL-MCNC: 28 MG/DL (ref 8–22)
CALCIUM SERPL-MCNC: 9.9 MG/DL (ref 8.5–10.5)
CHLORIDE SERPL-SCNC: 104 MMOL/L (ref 96–112)
CHOLEST SERPL-MCNC: 208 MG/DL (ref 100–199)
CO2 SERPL-SCNC: 27 MMOL/L (ref 20–33)
CREAT SERPL-MCNC: 1.47 MG/DL (ref 0.5–1.4)
FASTING STATUS PATIENT QL REPORTED: NORMAL
GLOBULIN SER CALC-MCNC: 2.4 G/DL (ref 1.9–3.5)
GLUCOSE SERPL-MCNC: 103 MG/DL (ref 65–99)
HDLC SERPL-MCNC: 63 MG/DL
LDLC SERPL CALC-MCNC: 127 MG/DL
POTASSIUM SERPL-SCNC: 4.3 MMOL/L (ref 3.6–5.5)
PROT SERPL-MCNC: 6.7 G/DL (ref 6–8.2)
SODIUM SERPL-SCNC: 138 MMOL/L (ref 135–145)
TRIGL SERPL-MCNC: 88 MG/DL (ref 0–149)

## 2019-09-25 PROCEDURE — 80061 LIPID PANEL: CPT

## 2019-09-25 PROCEDURE — 80053 COMPREHEN METABOLIC PANEL: CPT

## 2019-09-25 PROCEDURE — 36415 COLL VENOUS BLD VENIPUNCTURE: CPT

## 2019-09-25 NOTE — TELEPHONE ENCOUNTER
ESTABLISHED PATIENT PRE-VISIT PLANNING     Patient was NOT contacted to complete PVP.     Note: Patient will not be contacted if there is no indication to call.     1.  Reviewed notes from the last few office visits within the medical group: Yes    2.  If any orders were placed at last visit or intended to be done for this visit (i.e. 6 mos follow-up), do we have Results/Consult Notes?        •  Labs - Labs ordered, completed on 09/25/2019 and results are in chart.   Note: If patient appointment is for lab review and patient did not complete labs, check with provider if OK to reschedule patient until labs completed.       •  Imaging - Imaging was not ordered at last office visit.       •  Referrals - No referrals were ordered at last office visit.    3. Is this appointment scheduled as a Hospital Follow-Up? No    4.  Immunizations were updated in [facility] using Web[employer]?: Yes       •  Web [employer] Recommendations: FLU, TDAP and SHINGRIX (Shingles)    5.  Patient is due for the following Health Maintenance Topics:   Health Maintenance Due   Topic Date Due   • Annual Wellness Visit  1940   • IMM DTaP/Tdap/Td Vaccine (1 - Tdap) 12/17/1959   • IMM ZOSTER VACCINES (1 of 2) 12/17/1990   • MAMMOGRAM  05/10/2019   • COLON CANCER SCREENING ANNUAL FIT  05/26/2019   • IMM INFLUENZA (1) 09/01/2019       - Patient has completed PNEUMOVAX (PPSV23) and PREVNAR (PCV13)  Immunization(s) per WebIZ. Chart has been updated.    6. Orders for overdue Health Maintenance topics pended in Pre-Charting? NO    7.  AHA (MDX) form printed for Provider? No, already completed    8.  Patient was NOT informed to arrive 15 min prior to their scheduled appointment and bring in their medication bottles.

## 2019-10-02 ENCOUNTER — OFFICE VISIT (OUTPATIENT)
Dept: MEDICAL GROUP | Facility: MEDICAL CENTER | Age: 79
End: 2019-10-02
Payer: MEDICARE

## 2019-10-02 VITALS
HEIGHT: 68 IN | TEMPERATURE: 97.2 F | DIASTOLIC BLOOD PRESSURE: 62 MMHG | WEIGHT: 189.15 LBS | SYSTOLIC BLOOD PRESSURE: 126 MMHG | BODY MASS INDEX: 28.67 KG/M2 | HEART RATE: 88 BPM | RESPIRATION RATE: 14 BRPM | OXYGEN SATURATION: 97 %

## 2019-10-02 DIAGNOSIS — E78.00 HYPERCHOLESTEROLEMIA: ICD-10-CM

## 2019-10-02 DIAGNOSIS — R73.03 PREDIABETES: ICD-10-CM

## 2019-10-02 DIAGNOSIS — N18.30 CKD (CHRONIC KIDNEY DISEASE) STAGE 3, GFR 30-59 ML/MIN (HCC): ICD-10-CM

## 2019-10-02 DIAGNOSIS — Z12.12 SCREENING FOR COLORECTAL CANCER: ICD-10-CM

## 2019-10-02 DIAGNOSIS — I10 ESSENTIAL HYPERTENSION: ICD-10-CM

## 2019-10-02 DIAGNOSIS — Z23 NEED FOR VACCINATION: ICD-10-CM

## 2019-10-02 DIAGNOSIS — Z12.11 SCREENING FOR COLORECTAL CANCER: ICD-10-CM

## 2019-10-02 PROCEDURE — 99214 OFFICE O/P EST MOD 30 MIN: CPT | Mod: 25 | Performed by: FAMILY MEDICINE

## 2019-10-02 PROCEDURE — G0008 ADMIN INFLUENZA VIRUS VAC: HCPCS | Performed by: FAMILY MEDICINE

## 2019-10-02 PROCEDURE — 90662 IIV NO PRSV INCREASED AG IM: CPT | Performed by: FAMILY MEDICINE

## 2019-10-02 NOTE — ASSESSMENT & PLAN NOTE
This is a chronic health problem for this patient for which she is on lovastatin taking a total of 80 mg daily.  Total cholesterol is 208, triglycerides 88, HDL 63 LDL is up slightly to 127.  This medication is affordable and we will have her continue with that long-term.

## 2019-10-02 NOTE — ASSESSMENT & PLAN NOTE
This is a chronic health problem for this patient that she is actually doing well with.  Her fasting glucose is 103.  We will continue to follow.

## 2019-10-02 NOTE — ASSESSMENT & PLAN NOTE
This is a chronic health problem that has been present for a couple of years but her GFR was running mainly in the 50s.  In the last 7 months is dropped from 54-34.  In talking with her she does not drink much in the way of water.  I would like for her to start getting in three 8 ounce glasses of water daily.  I am hopeful that with doing that we will be able to get her kidney function to come back up.  Her BUN was 28 creatinine was 1.47 whereas previously her creatinine was only 0.99.  The rest of her comprehensive metabolic panel is completely normal other than the slight elevation in the blood sugar.

## 2019-10-02 NOTE — ASSESSMENT & PLAN NOTE
This is a chronic health problem that is well controlled with current medications and lifestyle measures. Her BP is excellent at 126/62.  Her weight is stable.

## 2019-10-02 NOTE — PROGRESS NOTES
CC:Diagnoses of Need for vaccination, Screening for colorectal cancer, Hypercholesterolemia, Essential hypertension, Prediabetes, and CKD (chronic kidney disease) stage 3, GFR 30-59 ml/min (HCC) were pertinent to this visit.    HISTORY OF PRESENT ILLNESS: Patient is a 78 y.o. female established patient who presents today to talk about her chronic health problems and go over her labs that were done prior to the visit.      Hypercholesterolemia  This is a chronic health problem for this patient for which she is on lovastatin taking a total of 80 mg daily.  Total cholesterol is 208, triglycerides 88, HDL 63 LDL is up slightly to 127.  This medication is affordable and we will have her continue with that long-term.    Essential hypertension  This is a chronic health problem that is well controlled with current medications and lifestyle measures. Her BP is excellent at 126/62.  Her weight is stable.    Prediabetes  This is a chronic health problem for this patient that she is actually doing well with.  Her fasting glucose is 103.  We will continue to follow.    CKD (chronic kidney disease) stage 3, GFR 30-59 ml/min (HCC)  This is a chronic health problem that has been present for a couple of years but her GFR was running mainly in the 50s.  In the last 7 months is dropped from 54-34.  In talking with her she does not drink much in the way of water.  I would like for her to start getting in three 8 ounce glasses of water daily.  I am hopeful that with doing that we will be able to get her kidney function to come back up.  Her BUN was 28 creatinine was 1.47 whereas previously her creatinine was only 0.99.  The rest of her comprehensive metabolic panel is completely normal other than the slight elevation in the blood sugar.      Patient Active Problem List    Diagnosis Date Noted   • CKD (chronic kidney disease) stage 3, GFR 30-59 ml/min (HCC) 10/02/2019   • Postoperative pain 02/11/2019   • Status post left hip replacement  02/11/2019   • Nonrheumatic aortic valve insufficiency 10/08/2018   • Gastroesophageal reflux disease without esophagitis 06/04/2018   • Menopause 06/04/2018   • Essential hypertension 05/03/2018   • Hypercholesterolemia 05/03/2018   • Prediabetes 05/03/2018   • Obesity (BMI 30-39.9) 05/03/2018      Allergies:Codeine    Current Outpatient Medications   Medication Sig Dispense Refill   • triamterene-hydrochlorothiazide (MAXZIDE 75-50) 75-50 MG Tab Take 1 Tab by mouth every day. 90 Tab 3   • pantoprazole (PROTONIX) 40 MG Tablet Delayed Response TAKE 1 TABLET BY MOUTH ONCE DAILY 90 Tab 3   • losartan (COZAAR) 100 MG Tab TAKE 1 TABLET BY MOUTH ONCE DAILY 90 Tab 3   • potassium chloride SA (KDUR) 20 MEQ Tab CR TAKE 1 TABLET BY MOUTH ONCE DAILY 90 Tab 3   • amLODIPine (NORVASC) 2.5 MG Tab TAKE 1 TABLET BY MOUTH ONCE DAILY 90 Tab 3   • aspirin EC 81 MG EC tablet Take 1 Tab by mouth 2 times a day. 60 Tab 0   • docusate sodium 100 MG Cap Take 100 mg by mouth 2 Times a Day. 60 Cap 0   • acetaminophen (TYLENOL) 500 MG Tab Take 500 mg by mouth Once.     • gabapentin (NEURONTIN) 600 MG tablet Take 600 mg by mouth Once.     • celecoxib (CELEBREX) 200 MG Cap Take 200 mg by mouth Once.     • lovastatin (MEVACOR) 40 MG tablet Take 2 Tabs by mouth every evening. 180 Tab 3   • vitamin D (CHOLECALCIFEROL) 1000 UNIT Tab Take 1,000 Units by mouth every day.     • Garlic 1000 MG Cap Take 1 Cap by mouth every day.       No current facility-administered medications for this visit.        Social History     Tobacco Use   • Smoking status: Never Smoker   • Smokeless tobacco: Never Used   Substance Use Topics   • Alcohol use: No   • Drug use: No     Social History     Social History Narrative   • Not on file       Family History   Problem Relation Age of Onset   • Other Mother         cause unknown   • Kidney Disease Father         went off dialysis        ROS:     - Constitutional:  Negative for fever, chills, unexpected weight change, and  "fatigue/generalized weakness.    - HEENT:  Negative for headaches, vision changes, hearing changes, ear pain, ear discharge, rhinorrhea, sinus congestion, sore throat, and neck pain.      - Respiratory: Negative for cough, sputum production, chest congestion, dyspnea, wheezing, and crackles.      - Cardiovascular: Negative for chest pain, palpitations, orthopnea, and bilateral lower extremity edema.     - Gastrointestinal: Negative for heartburn, nausea, vomiting, abdominal pain, hematochezia, melena, diarrhea, constipation, and greasy/foul-smelling stools.     - Genitourinary: Negative for dysuria, polyuria, hematuria, pyuria, urinary urgency, and urinary incontinence.     - Musculoskeletal: Patient complains of low back pain and bilateral hip pain that is chronic in nature.      - Skin: Negative for rash, itching, cyanotic skin color change.     - Neurological: Negative for dizziness, tingling, tremors, focal sensory deficit, focal weakness and headaches.     - Endo/Heme/Allergies: Does not bruise/bleed easily.     - Psychiatric/Behavioral: Negative for depression, suicidal/homicidal ideation and memory loss.          - NOTE: All other systems reviewed and are negative, except as in HPI.      Exam:    /62 (BP Location: Left arm, Patient Position: Sitting, BP Cuff Size: Adult)   Pulse 88   Temp 36.2 °C (97.2 °F) (Temporal)   Resp 14   Ht 1.727 m (5' 8\")   Wt 85.8 kg (189 lb 2.5 oz)   SpO2 97%  Body mass index is 28.76 kg/m².    General:  Well nourished, well developed female in NAD  Head is grossly normal.  Neck: Supple without JVD or bruit. Thyroid is not enlarged.  Pulmonary: Clear to ausculation and percussion.  Normal effort. No rales, ronchi, or wheezing.  Cardiovascular: Regular rate and rhythm without murmur. Carotid and radial pulses are intact and equal bilaterally.  Extremities: no clubbing, cyanosis, or edema.  LABS: 9/25/2019: Results reviewed and discussed with the patient, questions " answered.    Please note that this dictation was created using voice recognition software. I have made every reasonable attempt to correct obvious errors, but I expect that there are errors of grammar and possibly content that I did not discover before finalizing the note.    Assessment/Plan:  1. Need for vaccination  Given today  - Influenza Vaccine, High Dose (65+ Only)    2. Screening for colorectal cancer  Patient will use the kit provided  - Occult Blood Feces Immunoassay (FIT); Future    3. Hypercholesterolemia  Nearly controlled, we will continue to monitor  - Comp Metabolic Panel; Future  - Lipid Profile; Future    4. Essential hypertension  Controlled, continue with current meds and lifestyle.      5. Prediabetes  Controlled, continue with current meds and lifestyle.      6. CKD (chronic kidney disease) stage 3, GFR 30-59 ml/min (HCC)  Controlled, continue with current meds and lifestyle.

## 2019-10-04 DIAGNOSIS — E78.00 HYPERCHOLESTEROLEMIA: ICD-10-CM

## 2019-10-04 RX ORDER — LOVASTATIN 40 MG/1
TABLET ORAL
Qty: 180 TAB | Refills: 3 | Status: SHIPPED | OUTPATIENT
Start: 2019-10-04 | End: 2019-10-11 | Stop reason: SDUPTHER

## 2019-10-11 DIAGNOSIS — E78.00 HYPERCHOLESTEROLEMIA: ICD-10-CM

## 2019-10-11 RX ORDER — LOVASTATIN 40 MG/1
80 TABLET ORAL EVERY EVENING
Qty: 180 TAB | Refills: 3 | Status: SHIPPED | OUTPATIENT
Start: 2019-10-11 | End: 2019-12-30 | Stop reason: SDUPTHER

## 2019-11-06 ENCOUNTER — OFFICE VISIT (OUTPATIENT)
Dept: MEDICAL GROUP | Facility: MEDICAL CENTER | Age: 79
End: 2019-11-06
Payer: MEDICARE

## 2019-11-06 VITALS
WEIGHT: 189.38 LBS | RESPIRATION RATE: 14 BRPM | HEIGHT: 68 IN | BODY MASS INDEX: 28.7 KG/M2 | SYSTOLIC BLOOD PRESSURE: 128 MMHG | HEART RATE: 97 BPM | OXYGEN SATURATION: 98 % | TEMPERATURE: 97.2 F | DIASTOLIC BLOOD PRESSURE: 62 MMHG

## 2019-11-06 DIAGNOSIS — I10 ESSENTIAL HYPERTENSION: ICD-10-CM

## 2019-11-06 DIAGNOSIS — G31.84 MILD COGNITIVE IMPAIRMENT WITH MEMORY LOSS: ICD-10-CM

## 2019-11-06 PROCEDURE — 99214 OFFICE O/P EST MOD 30 MIN: CPT | Performed by: FAMILY MEDICINE

## 2019-11-06 NOTE — ASSESSMENT & PLAN NOTE
This is a chronic health problem that is well controlled with current medications and lifestyle measures.  Her BP is excellent at 128/62.

## 2019-11-06 NOTE — ASSESSMENT & PLAN NOTE
This patient is starting to experience some cognitive impairment and expressive aphasia.  She continues to have her son, Servando live with her and he helps with her ADLs etc. I talked with the patient about how she does with her ADLs at home and she states with [employer] [facility] and cueing she does fine.  She is able to keep up on her appointments and shows up appropriately.  She did bring with her her fit test and asked me to explained to her exactly how to get this done so that we can get her colon cancer screening accomplished.  We went through it and I showed her how to complete this.

## 2019-11-08 NOTE — PROGRESS NOTES
CC:Diagnoses of Essential hypertension and Mild cognitive impairment with memory loss were pertinent to this visit.    HISTORY OF PRESENT ILLNESS: Patient is a 78 y.o. female established patient who presents today to talk about her problems listed below.  Initially I was uncertain as to why the patient was here and she had a very difficult time getting to the reason that she was here.  After we talked for approximately 10 minutes and I realized that she was having some circular thinking we discussed the fact that she is having memory issues.  I offered to do a Mini-Mental status exam and she requested that I not do that at this time.  She currently seems to be doing well as far as recovering from her hip surgery and doing her ADLs at home where her son also resides.  She has difficulty with following through on multistep commands such as trying to do her colon cancer screening and she asked me to review that with her again today.      Essential hypertension  This is a chronic health problem that is well controlled with current medications and lifestyle measures.  Her BP is excellent at 128/62.    Mild cognitive impairment with memory loss  This patient is starting to experience some cognitive impairment and expressive aphasia.  She continues to have her son, Servando live with her and he helps with her ADLs etc. I talked with the patient about how she does with her ADLs at home and she states with [employer] and cueing she does fine.  She is able to keep up on her appointments and shows up appropriately.  She did bring with her her fit test and asked me to explained to her exactly how to get this done so that we can get her colon cancer screening accomplished.  We went through it and I showed her how to complete this.      Patient Active Problem List    Diagnosis Date Noted   • Mild cognitive impairment with memory loss 11/06/2019   • CKD (chronic kidney disease) stage 3, GFR 30-59 ml/min ([facility]) 10/02/2019   •  Postoperative pain 02/11/2019   • Status post left hip replacement 02/11/2019   • Nonrheumatic aortic valve insufficiency 10/08/2018   • Gastroesophageal reflux disease without esophagitis 06/04/2018   • Menopause 06/04/2018   • Essential hypertension 05/03/2018   • Hypercholesterolemia 05/03/2018   • Prediabetes 05/03/2018   • Obesity (BMI 30-39.9) 05/03/2018      Allergies:Codeine    Current Outpatient Medications   Medication Sig Dispense Refill   • lovastatin (MEVACOR) 40 MG tablet Take 2 Tabs by mouth every evening. 180 Tab 3   • triamterene-hydrochlorothiazide (MAXZIDE 75-50) 75-50 MG Tab Take 1 Tab by mouth every day. 90 Tab 3   • pantoprazole (PROTONIX) 40 MG Tablet Delayed Response TAKE 1 TABLET BY MOUTH ONCE DAILY 90 Tab 3   • losartan (COZAAR) 100 MG Tab TAKE 1 TABLET BY MOUTH ONCE DAILY 90 Tab 3   • potassium chloride SA (KDUR) 20 MEQ Tab CR TAKE 1 TABLET BY MOUTH ONCE DAILY 90 Tab 3   • amLODIPine (NORVASC) 2.5 MG Tab TAKE 1 TABLET BY MOUTH ONCE DAILY 90 Tab 3   • aspirin EC 81 MG EC tablet Take 1 Tab by mouth 2 times a day. 60 Tab 0   • docusate sodium 100 MG Cap Take 100 mg by mouth 2 Times a Day. 60 Cap 0   • acetaminophen (TYLENOL) 500 MG Tab Take 500 mg by mouth Once.     • gabapentin (NEURONTIN) 600 MG tablet Take 600 mg by mouth Once.     • celecoxib (CELEBREX) 200 MG Cap Take 200 mg by mouth Once.     • vitamin D (CHOLECALCIFEROL) 1000 UNIT Tab Take 1,000 Units by mouth every day.     • Garlic 1000 MG Cap Take 1 Cap by mouth every day.       No current facility-administered medications for this visit.        Social History     Tobacco Use   • Smoking status: Never Smoker   • Smokeless tobacco: Never Used   Substance Use Topics   • Alcohol use: No   • Drug use: No     Social History     Patient does not qualify to have social determinant information on file (likely too young).   Social History Narrative   • Not on file       Family History   Problem Relation Age of Onset   • Other Mother          "cause unknown   • Kidney Disease Father         went off dialysis        ROS: Patient is having cognitive impairment and is not a candidate to do a review of systems.         Exam:    /62 (BP Location: Left arm, Patient Position: Sitting, BP Cuff Size: Adult)   Pulse 97   Temp 36.2 °C (97.2 °F) (Temporal)   Resp 14   Ht 1.727 m (5' 8\")   Wt 85.9 kg (189 lb 6 oz)   SpO2 98%  Body mass index is 28.79 kg/m².    General:  Well nourished, well developed female in NAD  Head is grossly normal.  Neck: Supple without JVD or bruit. Thyroid is not enlarged.  Pulmonary: Clear to ausculation and percussion.  Normal effort. No rales, ronchi, or wheezing.  Cardiovascular: Regular rate and rhythm without murmur. Carotid and radial pulses are intact and equal bilaterally.  Extremities: no clubbing, cyanosis, or edema.  Patient was seen for 25 minutes face to face of which, 20 minutes was spent counseling regarding medications interactions and side effects as well as a discussion of her memory issues.  We also reviewed completely how to perform a fit test and I helped her complete the paperwork to send it in once is done..    Please note that this dictation was created using voice recognition software. I have made every reasonable attempt to correct obvious errors, but I expect that there are errors of grammar and possibly content that I did not discover before finalizing the note.    Assessment/Plan:  1. Essential hypertension  Controlled, continue with current meds and lifestyle.      2. Mild cognitive impairment with memory loss  Uncontrolled, at next appointment I definitely want to make certain that we do a Mini-Mental status exam and I have so noted in her chart.         "

## 2019-12-30 DIAGNOSIS — E78.00 HYPERCHOLESTEROLEMIA: ICD-10-CM

## 2019-12-30 DIAGNOSIS — I10 ESSENTIAL HYPERTENSION: ICD-10-CM

## 2019-12-30 RX ORDER — LOSARTAN POTASSIUM 100 MG/1
100 TABLET ORAL DAILY
Qty: 90 TAB | Refills: 3 | Status: SHIPPED | OUTPATIENT
Start: 2019-12-30 | End: 2020-08-14

## 2019-12-30 RX ORDER — LOVASTATIN 40 MG/1
80 TABLET ORAL EVERY EVENING
Qty: 180 TAB | Refills: 3 | Status: SHIPPED | OUTPATIENT
Start: 2019-12-30 | End: 2020-08-14

## 2020-01-08 ENCOUNTER — HOSPITAL ENCOUNTER (OUTPATIENT)
Facility: MEDICAL CENTER | Age: 80
End: 2020-01-08
Attending: FAMILY MEDICINE
Payer: MEDICARE

## 2020-01-08 PROCEDURE — 82274 ASSAY TEST FOR BLOOD FECAL: CPT

## 2020-01-09 DIAGNOSIS — Z12.12 SCREENING FOR COLORECTAL CANCER: ICD-10-CM

## 2020-01-09 DIAGNOSIS — Z12.11 SCREENING FOR COLORECTAL CANCER: ICD-10-CM

## 2020-01-09 LAB — HEMOCCULT STL QL IA: POSITIVE

## 2020-02-03 ENCOUNTER — OFFICE VISIT (OUTPATIENT)
Dept: MEDICAL GROUP | Facility: MEDICAL CENTER | Age: 80
End: 2020-02-03
Payer: MEDICARE

## 2020-02-03 VITALS
HEIGHT: 68 IN | OXYGEN SATURATION: 94 % | SYSTOLIC BLOOD PRESSURE: 128 MMHG | RESPIRATION RATE: 12 BRPM | HEART RATE: 84 BPM | DIASTOLIC BLOOD PRESSURE: 58 MMHG | BODY MASS INDEX: 27.4 KG/M2 | WEIGHT: 180.78 LBS | TEMPERATURE: 97 F

## 2020-02-03 DIAGNOSIS — N18.30 CKD (CHRONIC KIDNEY DISEASE) STAGE 3, GFR 30-59 ML/MIN: ICD-10-CM

## 2020-02-03 DIAGNOSIS — G31.84 MILD COGNITIVE IMPAIRMENT WITH MEMORY LOSS: ICD-10-CM

## 2020-02-03 DIAGNOSIS — E78.00 HYPERCHOLESTEROLEMIA: ICD-10-CM

## 2020-02-03 DIAGNOSIS — I10 ESSENTIAL HYPERTENSION: ICD-10-CM

## 2020-02-03 DIAGNOSIS — R19.5 POSITIVE FIT (FECAL IMMUNOCHEMICAL TEST): ICD-10-CM

## 2020-02-03 PROCEDURE — 8041 PR SCP AHA: Performed by: FAMILY MEDICINE

## 2020-02-03 PROCEDURE — 99214 OFFICE O/P EST MOD 30 MIN: CPT | Performed by: FAMILY MEDICINE

## 2020-02-03 RX ORDER — TRIAMTERENE AND HYDROCHLOROTHIAZIDE 37.5; 25 MG/1; MG/1
1 TABLET ORAL DAILY
Qty: 90 TAB | Refills: 3 | Status: SHIPPED | OUTPATIENT
Start: 2020-02-03 | End: 2020-08-14

## 2020-02-03 ASSESSMENT — PATIENT HEALTH QUESTIONNAIRE - PHQ9: CLINICAL INTERPRETATION OF PHQ2 SCORE: 0

## 2020-02-03 NOTE — ASSESSMENT & PLAN NOTE
This is a chronic health problem that is well controlled with current medications and lifestyle measures. Her GFR is staying above 30.

## 2020-02-03 NOTE — PROGRESS NOTES
Annual Health Assessment Questions:    1.  Are you currently engaging in any exercise or physical activity? Yes    2.  How would you describe your mood or emotional well-being today? good    3.  Have you had any falls in the last year? No    4.  Have you noticed any problems with your balance or had difficulty walking? No    5.  In the last six months have you experienced any leakage of urine? No    6. DPA/Advanced Directive: Patient does not have an Advanced Directive.  A packet and workshop information was given on Advanced Directives.   CC:Diagnoses of CKD (chronic kidney disease) stage 3, GFR 30-59 ml/min ([facility]), Essential hypertension, Positive FIT (fecal immunochemical test), and Mild cognitive impairment with memory loss were pertinent to this visit.    HISTORY OF PRESENT ILLNESS: Patient is a 79 y.o. female established patient who presents today to talk about her chronic health problems and to go over her annual health assessment questions.  Overall patient seems to be doing okay.  She does still show some mild cognitive impairment.  She states that most of that has gotten worse because she is alone all day and does not have any interactions to stimulate her brain.      CKD (chronic kidney disease) stage 3, GFR 30-59 ml/min ([facility])  This is a chronic health problem that is well controlled with current medications and lifestyle measures. Her GFR is staying above 30.    Essential hypertension  This is a chronic health problem that is well controlled with current medications and lifestyle measures.    Positive FIT (fecal immunochemical test)  Pt had a positive fit test but felt this was due to her having hemorrhoids. She is not interested in pursuing this with colonoscopy.    Mild cognitive impairment with memory loss  This is a problem for the patient but worsened by her spending all day alone in her home.      Patient Active Problem List    Diagnosis Date Noted   • Positive FIT (fecal immunochemical test)  02/03/2020   • Mild cognitive impairment with memory loss 11/06/2019   • CKD (chronic kidney disease) stage 3, GFR 30-59 ml/min ([facility]) 10/02/2019   • Postoperative pain 02/11/2019   • Status post left hip replacement 02/11/2019   • Nonrheumatic aortic valve insufficiency 10/08/2018   • Gastroesophageal reflux disease without esophagitis 06/04/2018   • Menopause 06/04/2018   • Essential hypertension 05/03/2018   • Hypercholesterolemia 05/03/2018   • Prediabetes 05/03/2018   • Obesity (BMI 30-39.9) 05/03/2018      Allergies:Codeine    Current Outpatient Medications   Medication Sig Dispense Refill   • lovastatin (MEVACOR) 40 MG tablet Take 2 Tabs by mouth every evening. 180 Tab 3   • losartan (COZAAR) 100 MG Tab Take 1 Tab by mouth every day. 90 Tab 3   • triamterene-hydrochlorothiazide (MAXZIDE 75-50) 75-50 MG Tab Take 1 Tab by mouth every day. 90 Tab 3   • pantoprazole (PROTONIX) 40 MG Tablet Delayed Response TAKE 1 TABLET BY MOUTH ONCE DAILY 90 Tab 3   • potassium chloride SA (KDUR) 20 MEQ Tab CR TAKE 1 TABLET BY MOUTH ONCE DAILY 90 Tab 3   • amLODIPine (NORVASC) 2.5 MG Tab TAKE 1 TABLET BY MOUTH ONCE DAILY 90 Tab 3   • aspirin EC 81 MG EC tablet Take 1 Tab by mouth 2 times a day. 60 Tab 0   • docusate sodium 100 MG Cap Take 100 mg by mouth 2 Times a Day. 60 Cap 0   • acetaminophen (TYLENOL) 500 MG Tab Take 500 mg by mouth Once.     • gabapentin (NEURONTIN) 600 MG tablet Take 600 mg by mouth Once.     • celecoxib (CELEBREX) 200 MG Cap Take 200 mg by mouth Once.     • vitamin D (CHOLECALCIFEROL) 1000 UNIT Tab Take 1,000 Units by mouth every day.     • Garlic 1000 MG Cap Take 1 Cap by mouth every day.       No current facility-administered medications for this visit.        Social History     Tobacco Use   • Smoking status: Never Smoker   • Smokeless tobacco: Never Used   Substance Use Topics   • Alcohol use: No   • Drug use: No     Social History     Patient does not qualify to have social determinant information on  "file (likely too young).   Social History Narrative   • Not on file       Family History   Problem Relation Age of Onset   • Other Mother         cause unknown   • Kidney Disease Father         went off dialysis        ROS: Patient suffering with mild cognitive impairment and unable to complete review of systems accurately.          Exam:    /58   Pulse 84   Temp 36.1 °C (97 °F)   Resp 12   Ht 1.727 m (5' 8\")   Wt 82 kg (180 lb 12.4 oz)   SpO2 94%  Body mass index is 27.49 kg/m².    General:  Well nourished, well developed female in NAD  Head is grossly normal.  Neck: Supple without JVD or bruit. Thyroid is not enlarged.  Pulmonary: Clear to ausculation and percussion.  Normal effort. No rales, ronchi, or wheezing.  Cardiovascular: Regular rate and rhythm without murmur. Carotid and radial pulses are intact and equal bilaterally.  Extremities: no clubbing, cyanosis, or edema.    Please note that this dictation was created using voice recognition software. I have made every reasonable attempt to correct obvious errors, but I expect that there are errors of grammar and possibly content that I did not discover before finalizing the note.    Assessment/Plan:  1. CKD (chronic kidney disease) stage 3, GFR 30-59 ml/min ([facility])  This is a chronic health problem that we will monitor again in 4 weeks.  Patient was to have done lab work prior to the visit but she forgot.    2. Essential hypertension  Controlled, continue with current meds and lifestyle.      3. Positive FIT (fecal immunochemical test)  Patient had a positive fit test but also has a bleeding hemorrhoid.  She is not a good candidate to move forward with FIT testing nor colonoscopy at age 79.    4. Mild cognitive impairment with memory loss  Uncontrolled and showing progression.  Patient resides with her son who helps her with her finances and day-to-day living.    5.  Hypercholesterolemia:  Pt will do labs prior to her return in 4 weeks       "

## 2020-02-03 NOTE — ASSESSMENT & PLAN NOTE
Pt had a positive fit test but felt this was due to her having hemorrhoids. She is not interested in pursuing this with colonoscopy.

## 2020-02-13 ENCOUNTER — HOSPITAL ENCOUNTER (OUTPATIENT)
Dept: LAB | Facility: MEDICAL CENTER | Age: 80
End: 2020-02-13
Attending: FAMILY MEDICINE
Payer: MEDICARE

## 2020-02-13 DIAGNOSIS — E78.00 HYPERCHOLESTEROLEMIA: ICD-10-CM

## 2020-02-13 LAB
ALBUMIN SERPL BCP-MCNC: 3.9 G/DL (ref 3.2–4.9)
ALBUMIN/GLOB SERPL: 1.4 G/DL
ALP SERPL-CCNC: 77 U/L (ref 30–99)
ALT SERPL-CCNC: 7 U/L (ref 2–50)
ANION GAP SERPL CALC-SCNC: 9 MMOL/L (ref 0–11.9)
AST SERPL-CCNC: 17 U/L (ref 12–45)
BILIRUB SERPL-MCNC: 1 MG/DL (ref 0.1–1.5)
BUN SERPL-MCNC: 18 MG/DL (ref 8–22)
CALCIUM SERPL-MCNC: 9.8 MG/DL (ref 8.5–10.5)
CHLORIDE SERPL-SCNC: 109 MMOL/L (ref 96–112)
CHOLEST SERPL-MCNC: 169 MG/DL (ref 100–199)
CO2 SERPL-SCNC: 25 MMOL/L (ref 20–33)
CREAT SERPL-MCNC: 1.24 MG/DL (ref 0.5–1.4)
GLOBULIN SER CALC-MCNC: 2.7 G/DL (ref 1.9–3.5)
GLUCOSE SERPL-MCNC: 108 MG/DL (ref 65–99)
HDLC SERPL-MCNC: 56 MG/DL
LDLC SERPL CALC-MCNC: 100 MG/DL
POTASSIUM SERPL-SCNC: 4.8 MMOL/L (ref 3.6–5.5)
PROT SERPL-MCNC: 6.6 G/DL (ref 6–8.2)
SODIUM SERPL-SCNC: 143 MMOL/L (ref 135–145)
TRIGL SERPL-MCNC: 65 MG/DL (ref 0–149)

## 2020-02-13 PROCEDURE — 80061 LIPID PANEL: CPT

## 2020-02-13 PROCEDURE — 80053 COMPREHEN METABOLIC PANEL: CPT

## 2020-02-13 PROCEDURE — 36415 COLL VENOUS BLD VENIPUNCTURE: CPT

## 2020-03-04 ENCOUNTER — OFFICE VISIT (OUTPATIENT)
Dept: MEDICAL GROUP | Facility: MEDICAL CENTER | Age: 80
End: 2020-03-04

## 2020-03-04 ENCOUNTER — APPOINTMENT (OUTPATIENT)
Dept: MEDICAL GROUP | Facility: MEDICAL CENTER | Age: 80
End: 2020-03-04
Payer: MEDICARE

## 2020-03-04 VITALS
WEIGHT: 174.16 LBS | HEART RATE: 86 BPM | BODY MASS INDEX: 26.4 KG/M2 | SYSTOLIC BLOOD PRESSURE: 110 MMHG | TEMPERATURE: 98 F | OXYGEN SATURATION: 97 % | DIASTOLIC BLOOD PRESSURE: 52 MMHG | RESPIRATION RATE: 14 BRPM | HEIGHT: 68 IN

## 2020-03-04 DIAGNOSIS — N18.30 CKD (CHRONIC KIDNEY DISEASE) STAGE 3, GFR 30-59 ML/MIN: ICD-10-CM

## 2020-03-04 DIAGNOSIS — I10 ESSENTIAL HYPERTENSION: ICD-10-CM

## 2020-03-04 DIAGNOSIS — E78.00 HYPERCHOLESTEROLEMIA: ICD-10-CM

## 2020-03-04 DIAGNOSIS — R73.03 PREDIABETES: ICD-10-CM

## 2020-03-04 PROCEDURE — 99214 OFFICE O/P EST MOD 30 MIN: CPT | Performed by: FAMILY MEDICINE

## 2020-03-04 ASSESSMENT — FIBROSIS 4 INDEX: FIB4 SCORE: 1.67

## 2020-03-04 NOTE — ASSESSMENT & PLAN NOTE
This is a chronic health problem that is well controlled with current medications and lifestyle measures.  Her GFR shows improvement now up to 42.  We are going to have her start drinking 4 glasses of water daily in an effort to keep her kidney function improving.

## 2020-03-04 NOTE — ASSESSMENT & PLAN NOTE
This is a chronic health problem for this patient where her blood sugars have been slightly elevated and currently her fasting glucose was 108.  We will continue to monitor.  This is the highest she has had for the last 3 years.

## 2020-03-04 NOTE — PROGRESS NOTES
CC:Diagnoses of CKD (chronic kidney disease) stage 3, GFR 30-59 ml/min ([facility]), Essential hypertension, Hypercholesterolemia, and Prediabetes were pertinent to this visit.    HISTORY OF PRESENT ILLNESS: Patient is a 79 y.o. female established patient who presents today to talk about her chronic health problems and go over her labs that she had done prior to the office visit.      CKD (chronic kidney disease) stage 3, GFR 30-59 ml/min ([facility])  This is a chronic health problem that is well controlled with current medications and lifestyle measures.  Her GFR shows improvement now up to 42.  We are going to have her start drinking 4 glasses of water daily in an effort to keep her kidney function improving.    Essential hypertension  This is a chronic health problem for this patient for which she is on triamterene/hydrochlorothiazide 37.5/25 along with losartan 100 mg daily.  Blood pressure is excellent at 110/52 and her weight is stable.  We will plan for her to continue with current meds    Hypercholesterolemia  This is a chronic health problem that is well controlled with current medications and lifestyle measures.  Her total cholesterol is excellent at 169, triglycerides good at 65, HDL good at 56 LDL good at 100 and there is no liver dysfunction.    Prediabetes  This is a chronic health problem for this patient where her blood sugars have been slightly elevated and currently her fasting glucose was 108.  We will continue to monitor.  This is the highest she has had for the last 3 years.      Patient Active Problem List    Diagnosis Date Noted   • Positive FIT (fecal immunochemical test) 02/03/2020   • Mild cognitive impairment with memory loss 11/06/2019   • CKD (chronic kidney disease) stage 3, GFR 30-59 ml/min ([facility]) 10/02/2019   • Postoperative pain 02/11/2019   • Status post left hip replacement 02/11/2019   • Nonrheumatic aortic valve insufficiency 10/08/2018   • Gastroesophageal reflux disease without esophagitis  06/04/2018   • Menopause 06/04/2018   • Essential hypertension 05/03/2018   • Hypercholesterolemia 05/03/2018   • Prediabetes 05/03/2018   • Obesity (BMI 30-39.9) 05/03/2018      Allergies:Codeine    Current Outpatient Medications   Medication Sig Dispense Refill   • triamterene-hctz (MAXZIDE-25/DYAZIDE) 37.5-25 MG Tab Take 1 Tab by mouth every day. 90 Tab 3   • lovastatin (MEVACOR) 40 MG tablet Take 2 Tabs by mouth every evening. 180 Tab 3   • losartan (COZAAR) 100 MG Tab Take 1 Tab by mouth every day. 90 Tab 3   • pantoprazole (PROTONIX) 40 MG Tablet Delayed Response TAKE 1 TABLET BY MOUTH ONCE DAILY 90 Tab 3   • potassium chloride SA (KDUR) 20 MEQ Tab CR TAKE 1 TABLET BY MOUTH ONCE DAILY 90 Tab 3   • amLODIPine (NORVASC) 2.5 MG Tab TAKE 1 TABLET BY MOUTH ONCE DAILY 90 Tab 3   • aspirin EC 81 MG EC tablet Take 1 Tab by mouth 2 times a day. 60 Tab 0   • docusate sodium 100 MG Cap Take 100 mg by mouth 2 Times a Day. 60 Cap 0   • acetaminophen (TYLENOL) 500 MG Tab Take 500 mg by mouth Once.     • gabapentin (NEURONTIN) 600 MG tablet Take 600 mg by mouth Once.     • celecoxib (CELEBREX) 200 MG Cap Take 200 mg by mouth Once.     • vitamin D (CHOLECALCIFEROL) 1000 UNIT Tab Take 1,000 Units by mouth every day.     • Garlic 1000 MG Cap Take 1 Cap by mouth every day.       No current facility-administered medications for this visit.        Social History     Tobacco Use   • Smoking status: Never Smoker   • Smokeless tobacco: Never Used   Substance Use Topics   • Alcohol use: No   • Drug use: No     Social History     Social History Narrative   • Not on file       Family History   Problem Relation Age of Onset   • Other Mother         cause unknown   • Kidney Disease Father         went off dialysis        ROS: This patient has mild cognitive impairment and cannot fully participate in a review of systems.        Exam:    /52 (BP Location: Left arm, Patient Position: Sitting, BP Cuff Size: Adult)   Pulse 86   Temp  "36.7 °C (98 °F) (Temporal)   Resp 14   Ht 1.727 m (5' 7.99\")   Wt 79 kg (174 lb 2.6 oz)   SpO2 97%  Body mass index is 26.49 kg/m².    General:  Well nourished, well developed female in NAD  Head is grossly normal.  Neck: Supple without JVD or bruit. Thyroid is not enlarged.  Pulmonary: Clear to ausculation and percussion.  Normal effort. No rales, ronchi, or wheezing.  Cardiovascular: Regular rate and rhythm without murmur. Carotid and radial pulses are intact and equal bilaterally.  Extremities: no clubbing, cyanosis, or edema.  LABS: 2/13/2020: Results reviewed and discussed with the patient, questions answered.    Please note that this dictation was created using voice recognition software. I have made every reasonable attempt to correct obvious errors, but I expect that there are errors of grammar and possibly content that I did not discover before finalizing the note.    Assessment/Plan:  1. CKD (chronic kidney disease) stage 3, GFR 30-59 ml/min ([facility])  Stable actually showing improvement.  We will continue to monitor.    2. Essential hypertension  Controlled, continue with current meds and lifestyle.      3. Hypercholesterolemia  Controlled, continue with current meds and lifestyle.      4. Prediabetes  Patient's blood sugar is stable at 108 we will continue to monitor.         "

## 2020-03-04 NOTE — ASSESSMENT & PLAN NOTE
This is a chronic health problem for this patient for which she is on triamterene/hydrochlorothiazide 37.5/25 along with losartan 100 mg daily.  Blood pressure is excellent at 110/52 and her weight is stable.  We will plan for her to continue with current meds

## 2020-03-04 NOTE — ASSESSMENT & PLAN NOTE
Has forms already filled out at home, will bring us a copy. This is a chronic health problem that is well controlled with current medications and lifestyle measures.  Her total cholesterol is excellent at 169, triglycerides good at 65, HDL good at 56 LDL good at 100 and there is no liver dysfunction.

## 2020-03-31 ENCOUNTER — OFFICE VISIT (OUTPATIENT)
Dept: MEDICAL GROUP | Facility: MEDICAL CENTER | Age: 80
End: 2020-03-31
Payer: MEDICARE

## 2020-03-31 VITALS
OXYGEN SATURATION: 97 % | HEART RATE: 69 BPM | DIASTOLIC BLOOD PRESSURE: 62 MMHG | RESPIRATION RATE: 14 BRPM | HEIGHT: 68 IN | SYSTOLIC BLOOD PRESSURE: 130 MMHG | WEIGHT: 184.3 LBS | TEMPERATURE: 98.2 F | BODY MASS INDEX: 27.93 KG/M2

## 2020-03-31 DIAGNOSIS — H90.6 MIXED CONDUCTIVE AND SENSORINEURAL HEARING LOSS OF BOTH EARS: ICD-10-CM

## 2020-03-31 PROCEDURE — 99213 OFFICE O/P EST LOW 20 MIN: CPT | Performed by: FAMILY MEDICINE

## 2020-03-31 ASSESSMENT — FIBROSIS 4 INDEX: FIB4 SCORE: 1.67

## 2020-03-31 NOTE — PROGRESS NOTES
"CC:The encounter diagnosis was Mixed conductive and sensorineural hearing loss of both ears.    HISTORY OF PRESENT ILLNESS: Patient is a 79 y.o. female established patient who presents today to talk about hearing aids according to what her son told the schedulers.  Patient is extremely confused as I walk into the room she starts asking me about her explanation of benefits paperwork that she has with her.  I explained to her that I do not know why she would be bringing that to me and she states that some \"woman told her I could explain it\".  I reviewed our last visit and talked with her about that which she recalls as having.  But she does not understand what question she has about the EOB paperwork.  Ultimately we decided to just do a referral for audiology.      Mixed conductive and sensorineural hearing loss of both ears  This patient is here today to request a referral to see an audiologist to try and get hearing aids because she is had enough hearing loss that she is having difficulty in her day-to-day activities of daily living.  She has [employer] and they will help her to pay for her hearing aids.  We will put in a referral today.      Patient Active Problem List    Diagnosis Date Noted   • Mixed conductive and sensorineural hearing loss of both ears 03/31/2020   • Positive FIT (fecal immunochemical test) 02/03/2020   • Mild cognitive impairment with memory loss 11/06/2019   • CKD (chronic kidney disease) stage 3, GFR 30-59 ml/min ([facility]) 10/02/2019   • Postoperative pain 02/11/2019   • Status post left hip replacement 02/11/2019   • Nonrheumatic aortic valve insufficiency 10/08/2018   • Gastroesophageal reflux disease without esophagitis 06/04/2018   • Menopause 06/04/2018   • Essential hypertension 05/03/2018   • Hypercholesterolemia 05/03/2018   • Prediabetes 05/03/2018   • Obesity (BMI 30-39.9) 05/03/2018      Allergies:Codeine    Current Outpatient Medications   Medication Sig Dispense Refill   • " "triamterene-hctz (MAXZIDE-25/DYAZIDE) 37.5-25 MG Tab Take 1 Tab by mouth every day. 90 Tab 3   • lovastatin (MEVACOR) 40 MG tablet Take 2 Tabs by mouth every evening. 180 Tab 3   • losartan (COZAAR) 100 MG Tab Take 1 Tab by mouth every day. 90 Tab 3   • pantoprazole (PROTONIX) 40 MG Tablet Delayed Response TAKE 1 TABLET BY MOUTH ONCE DAILY 90 Tab 3   • potassium chloride SA (KDUR) 20 MEQ Tab CR TAKE 1 TABLET BY MOUTH ONCE DAILY 90 Tab 3   • amLODIPine (NORVASC) 2.5 MG Tab TAKE 1 TABLET BY MOUTH ONCE DAILY 90 Tab 3   • aspirin EC 81 MG EC tablet Take 1 Tab by mouth 2 times a day. 60 Tab 0   • docusate sodium 100 MG Cap Take 100 mg by mouth 2 Times a Day. 60 Cap 0   • acetaminophen (TYLENOL) 500 MG Tab Take 500 mg by mouth Once.     • gabapentin (NEURONTIN) 600 MG tablet Take 600 mg by mouth Once.     • celecoxib (CELEBREX) 200 MG Cap Take 200 mg by mouth Once.     • vitamin D (CHOLECALCIFEROL) 1000 UNIT Tab Take 1,000 Units by mouth every day.     • Garlic 1000 MG Cap Take 1 Cap by mouth every day.       No current facility-administered medications for this visit.        Social History     Tobacco Use   • Smoking status: Never Smoker   • Smokeless tobacco: Never Used   Substance Use Topics   • Alcohol use: No   • Drug use: No     Social History     Social History Narrative   • Not on file       Family History   Problem Relation Age of Onset   • Other Mother         cause unknown   • Kidney Disease Father         went off dialysis        ROS: Patient has cognitive impairment and cannot do a review of systems.        Exam:    /62 (BP Location: Left arm, Patient Position: Sitting, BP Cuff Size: Adult)   Pulse 69   Temp 36.8 °C (98.2 °F) (Temporal)   Resp 14   Ht 1.727 m (5' 7.99\")   Wt 83.6 kg (184 lb 4.9 oz)   SpO2 97%  Body mass index is 28.03 kg/m².    General:  Well nourished, well developed female in NAD  HEENT: Eyes conjunctiva is clear, lids without ptosis, pupils equal round and reactive to light and " accommodation.  Ears normal shape and contour, canals are clear bilaterally, TMs with  good light reflex and appear normal.  Nasal mucosa benign.  Oropharynx benign.  Sinuses (frontal and maxillary) nontender to palpation.  Head is grossly normal.  Neck: Supple without JVD or bruit. Thyroid is not enlarged.  Pulmonary: Clear to ausculation and percussion.  Normal effort. No rales, ronchi, or wheezing.  Cardiovascular: Regular rate and rhythm without murmur. Carotid and radial pulses are intact and equal bilaterally.  Extremities: no clubbing, cyanosis, or edema.    Please note that this dictation was created using voice recognition software. I have made every reasonable attempt to correct obvious errors, but I expect that there are errors of grammar and possibly content that I did not discover before finalizing the note.    Assessment/Plan:  1. Mixed conductive and sensorineural hearing loss of both ears  Not certain that this is what the patient is looking for but she tells me that her son feels she needs to have hearing aids and she is willing to go for audiology testing.  I have put in that referral and given her the telephone number to call to make those appointments.  - REFERRAL TO AUDIOLOGY

## 2020-03-31 NOTE — ASSESSMENT & PLAN NOTE
This patient is here today to request a referral to see an audiologist to try and get hearing aids because she is had enough hearing loss that she is having difficulty in her day-to-day activities of daily living.  She has senior [facility] and they will help her to pay for her hearing aids.  We will put in a referral today.

## 2020-07-25 ENCOUNTER — OFFICE VISIT (OUTPATIENT)
Dept: URGENT CARE | Facility: CLINIC | Age: 80
End: 2020-07-25
Payer: MEDICARE

## 2020-07-25 VITALS
HEART RATE: 88 BPM | BODY MASS INDEX: 27.13 KG/M2 | WEIGHT: 179 LBS | DIASTOLIC BLOOD PRESSURE: 70 MMHG | OXYGEN SATURATION: 95 % | HEIGHT: 68 IN | SYSTOLIC BLOOD PRESSURE: 158 MMHG | TEMPERATURE: 98.4 F | RESPIRATION RATE: 16 BRPM

## 2020-07-25 DIAGNOSIS — R60.0 BILATERAL LOWER EXTREMITY EDEMA: ICD-10-CM

## 2020-07-25 PROCEDURE — 99213 OFFICE O/P EST LOW 20 MIN: CPT | Performed by: PHYSICIAN ASSISTANT

## 2020-07-25 ASSESSMENT — ENCOUNTER SYMPTOMS
PALPITATIONS: 0
COUGH: 0
CHILLS: 0
DIZZINESS: 0
ORTHOPNEA: 0
SHORTNESS OF BREATH: 0
FEVER: 0
PND: 0
HEADACHES: 0

## 2020-07-25 ASSESSMENT — FIBROSIS 4 INDEX: FIB4 SCORE: 1.67

## 2020-07-25 NOTE — PROGRESS NOTES
Subjective:      Olesya Harmon is a 79 y.o. female who presents with Edema (bilateral lower extremity, worse on right, pt is unsure of duration )            HPI  79-year-old female presents to urgent care with new problem of bilateral lower extremity edema worsening over the last few weeks, however patient does not recall exact timing. She denies chest pain, shortness of breath, orthopnea, or lower extremity pain/weakness.  She denies known history of CHF.  She is not taking any diuretics.  Denies history of DVT. Patient has history of mild cognitive impairment with memory loss, she lives with her son. Denies other associated aggravating or alleviating factors.     Review of Systems   Constitutional: Negative for chills, fever and malaise/fatigue.   Respiratory: Negative for cough and shortness of breath.    Cardiovascular: Positive for leg swelling. Negative for chest pain, palpitations, orthopnea and PND.   Gastrointestinal: Negative for abdominal pain.   Musculoskeletal: Negative for myalgias.   Neurological: Negative for dizziness, loss of consciousness, weakness and headaches.   Endo/Heme/Allergies: Does not bruise/bleed easily.   Psychiatric/Behavioral: Positive for memory loss.       Past Medical History:   Diagnosis Date   • Arthritis     left hand   • Arthritis of left hip 10/8/2018   • CATARACT     joan surgery   • CKD (chronic kidney disease) stage 3, GFR 30-59 ml/min ([facility]) 10/2/2019   • Essential hypertension 5/3/2018   • Heart murmur 10/8/2018   • High cholesterol    • Hypercholesterolemia 5/3/2018   • Hypertension    • Hypokalemia 5/3/2018   • Iron deficiency anemia due to chronic blood loss 5/3/2018   • Menopause 6/4/2018   • Mild cognitive impairment with memory loss 11/6/2019   • Mixed conductive and sensorineural hearing loss of both ears 3/31/2020   • Nonrheumatic aortic valve insufficiency 10/8/2018   • Prediabetes 5/3/2018   • Sacroiliitis, not elsewhere classified (HCC) 6/15/2018    Injected  "2017 by Dr. Zollinger     Medications and allergies reviewed in [facility].  Social History     Tobacco Use   • Smoking status: Never Smoker   • Smokeless tobacco: Never Used   Substance Use Topics   • Alcohol use: No        Objective:     /70   Pulse 88   Temp 36.9 °C (98.4 °F) (Temporal)   Resp 16   Ht 1.727 m (5' 8\")   Wt 81.2 kg (179 lb)   SpO2 95%   BMI 27.22 kg/m²      Physical Exam  Vitals signs reviewed.   Constitutional:       General: She is not in acute distress.     Appearance: Normal appearance. She is not ill-appearing.   HENT:      Head: Normocephalic and atraumatic.      Mouth/Throat:      Mouth: Mucous membranes are moist.      Pharynx: Oropharynx is clear.   Neck:      Musculoskeletal: Normal range of motion and neck supple.   Cardiovascular:      Rate and Rhythm: Normal rate and regular rhythm.      Pulses: Normal pulses.      Heart sounds: Normal heart sounds.   Pulmonary:      Effort: Pulmonary effort is normal. No respiratory distress.      Breath sounds: Normal breath sounds. No wheezing, rhonchi or rales.   Musculoskeletal:      Right lower le+ Pitting Edema present.      Left lower le+ Pitting Edema present.   Neurological:      Mental Status: She is alert. Mental status is at baseline.                 Assessment/Plan:     1. Bilateral lower extremity edema       I do not recommend any urgent labs or imaging.  Discussed with son and patient at bedside regarding close follow-up with primary care provider.  Recommend they make an appointment as soon as possible.  Consider addition of Lasix and work-up for CHF.  Patient is in no acute distress on examination.  Her vital signs are stable. Patient and son verbalized understanding of treatment plan and have no further questions regarding care.     "

## 2020-07-27 ASSESSMENT — ENCOUNTER SYMPTOMS
BRUISES/BLEEDS EASILY: 0
MEMORY LOSS: 1
MYALGIAS: 0
LOSS OF CONSCIOUSNESS: 0
ABDOMINAL PAIN: 0
WEAKNESS: 0

## 2020-08-14 ENCOUNTER — OFFICE VISIT (OUTPATIENT)
Dept: MEDICAL GROUP | Facility: PHYSICIAN GROUP | Age: 80
End: 2020-08-14
Payer: MEDICARE

## 2020-08-14 VITALS
DIASTOLIC BLOOD PRESSURE: 72 MMHG | HEART RATE: 79 BPM | WEIGHT: 177.2 LBS | OXYGEN SATURATION: 96 % | HEIGHT: 68 IN | RESPIRATION RATE: 16 BRPM | BODY MASS INDEX: 26.86 KG/M2 | SYSTOLIC BLOOD PRESSURE: 115 MMHG | TEMPERATURE: 97.8 F

## 2020-08-14 DIAGNOSIS — L20.84 INTRINSIC ECZEMA: ICD-10-CM

## 2020-08-14 DIAGNOSIS — K21.9 GASTROESOPHAGEAL REFLUX DISEASE WITHOUT ESOPHAGITIS: ICD-10-CM

## 2020-08-14 DIAGNOSIS — E66.9 OBESITY (BMI 30-39.9): ICD-10-CM

## 2020-08-14 DIAGNOSIS — I10 ESSENTIAL HYPERTENSION: ICD-10-CM

## 2020-08-14 PROCEDURE — 99214 OFFICE O/P EST MOD 30 MIN: CPT | Performed by: FAMILY MEDICINE

## 2020-08-14 RX ORDER — TRIAMCINOLONE ACETONIDE 1 MG/G
CREAM TOPICAL
Qty: 60 G | Refills: 3 | Status: ON HOLD | OUTPATIENT
Start: 2020-08-14 | End: 2021-02-28

## 2020-08-14 ASSESSMENT — FIBROSIS 4 INDEX: FIB4 SCORE: 1.67

## 2020-08-14 NOTE — ASSESSMENT & PLAN NOTE
This is been a problem for the patient in the past where she was having reflux.  She was placed on Protonix and has not taken that medication now for an unknown amount of time.  She is actually feeling good only having some problems with some eczema.

## 2020-08-14 NOTE — ASSESSMENT & PLAN NOTE
This is a chronic health problem that is well controlled with current medications and lifestyle measures.  Patient's weight is now 177 and her BMI is down to 26.94.

## 2020-08-14 NOTE — PROGRESS NOTES
CC:Diagnoses of Intrinsic eczema, Essential hypertension, Gastroesophageal reflux disease without esophagitis, and Obesity (BMI 30-39.9) were pertinent to this visit.    HISTORY OF PRESENT ILLNESS: Patient is a 79 y.o. female established patient who presents today to talk about her chronic health problems and to inform me that she has taken herself off of all of her medications.  This patient has cognitive impairment and cannot tell me when was the last time she took her meds but she knows it is been over a month.  Her blood pressure is good and she tells me she does not want to go back on medications.  She really only wants to be treated for a rash that she has today.      Intrinsic eczema  This is a problem for this patient that is currently present where she is having itching in her upper arms bilaterally.  When you look at her skin she has eczematous lesions.  She states that when she itches them it will kind to calm them down for a little bit but then the itching comes back.  She has had no changes in soaps or cleaning products, no new animals and nothing that she can identify that would be causing this reaction.    Essential hypertension  This is been a problem for the patient in the past for which she was on losartan 100 mg daily and amlodipine 2.5 mg daily.  She is continued to lose weight and now her blood pressure has normalized with her not taking her medications for an unknown amount of time.  She still taking vitamin D which is important and she is also taking garlic extract.  We will have her continue those and stop the other medications.    Gastroesophageal reflux disease without esophagitis  This is been a problem for the patient in the past where she was having reflux.  She was placed on Protonix and has not taken that medication now for an unknown amount of time.  She is actually feeling good only having some problems with some eczema.    Obesity (BMI 30-39.9)  This is a chronic health problem that  is well controlled with current medications and lifestyle measures.  Patient's weight is now 177 and her BMI is down to 26.94.      Patient Active Problem List    Diagnosis Date Noted   • Intrinsic eczema 08/14/2020   • Mixed conductive and sensorineural hearing loss of both ears 03/31/2020   • Positive FIT (fecal immunochemical test) 02/03/2020   • Mild cognitive impairment with memory loss 11/06/2019   • CKD (chronic kidney disease) stage 3, GFR 30-59 ml/min ([facility]) 10/02/2019   • Postoperative pain 02/11/2019   • Status post left hip replacement 02/11/2019   • Nonrheumatic aortic valve insufficiency 10/08/2018   • Gastroesophageal reflux disease without esophagitis 06/04/2018   • Menopause 06/04/2018   • Essential hypertension 05/03/2018   • Hypercholesterolemia 05/03/2018   • Prediabetes 05/03/2018      Allergies:Codeine    Current Outpatient Medications   Medication Sig Dispense Refill   • triamcinolone acetonide (KENALOG) 0.1 % Cream Apply to affected areas twice a day till clear  Indications: Atopic Dermatitis 60 g 3   • aspirin EC 81 MG EC tablet Take 1 Tab by mouth 2 times a day. 60 Tab 0   • docusate sodium 100 MG Cap Take 100 mg by mouth 2 Times a Day. 60 Cap 0   • acetaminophen (TYLENOL) 500 MG Tab Take 500 mg by mouth Once.     • vitamin D (CHOLECALCIFEROL) 1000 UNIT Tab Take 1,000 Units by mouth every day.     • Garlic 1000 MG Cap Take 1 Cap by mouth every day.       No current facility-administered medications for this visit.        Social History     Tobacco Use   • Smoking status: Never Smoker   • Smokeless tobacco: Never Used   Substance Use Topics   • Alcohol use: No   • Drug use: No     Social History     Social History Narrative   • Not on file       Family History   Problem Relation Age of Onset   • Other Mother         cause unknown   • Kidney Disease Father         went off dialysis        ROS: This patient has cognitive impairment and is not able to appropriately do review of systems.     "    Exam:    /72 (BP Location: Left arm, Patient Position: Sitting, BP Cuff Size: Adult)   Pulse 79   Temp 36.6 °C (97.8 °F) (Temporal)   Resp 16   Ht 1.727 m (5' 8\")   Wt 80.4 kg (177 lb 3.2 oz)   SpO2 96%  Body mass index is 26.94 kg/m².    General:  Well nourished, well developed female in NAD  Head is grossly normal.  Neck: Supple without JVD or bruit. Thyroid is not enlarged.  Pulmonary: Clear to ausculation and percussion.  Normal effort. No rales, ronchi, or wheezing.  Cardiovascular: Regular rate and rhythm without murmur. Carotid and radial pulses are intact and equal bilaterally.  Extremities: no clubbing, cyanosis, or edema.  Skin: Edematous lesions on the upper arms bilaterally signs of excoriation but no secondary infection.    Please note that this dictation was created using voice recognition software. I have made every reasonable attempt to correct obvious errors, but I expect that there are errors of grammar and possibly content that I did not discover before finalizing the note.    Assessment/Plan:  1. Intrinsic eczema  Uncontrolled, will try triamcinolone 0.1% cream applied twice daily till clear    2. Essential hypertension  Adequately controlled with lifestyle management.  This patient states that she is taken herself off of all of her medications and does not want to go back on them.  Her blood pressure is excellent today    3. Gastroesophageal reflux disease without esophagitis  Patient has gone off of her PPI and not having any problems with increased reflux    4. Obesity (BMI 30-39.9)  Patient is no longer obese having lost weight through weight loss attempts.  We will resolve this issue.         "

## 2020-08-14 NOTE — ASSESSMENT & PLAN NOTE
This is a problem for this patient that is currently present where she is having itching in her upper arms bilaterally.  When you look at her skin she has eczematous lesions.  She states that when she itches them it will kind to calm them down for a little bit but then the itching comes back.  She has had no changes in soaps or cleaning products, no new animals and nothing that she can identify that would be causing this reaction.

## 2020-08-14 NOTE — ASSESSMENT & PLAN NOTE
This is been a problem for the patient in the past for which she was on losartan 100 mg daily and amlodipine 2.5 mg daily.  She is continued to lose weight and now her blood pressure has normalized with her not taking her medications for an unknown amount of time.  She still taking vitamin D which is important and she is also taking garlic extract.  We will have her continue those and stop the other medications.

## 2020-08-18 ENCOUNTER — HOSPITAL ENCOUNTER (EMERGENCY)
Facility: MEDICAL CENTER | Age: 80
End: 2020-08-18
Attending: EMERGENCY MEDICINE
Payer: MEDICARE

## 2020-08-18 VITALS
DIASTOLIC BLOOD PRESSURE: 73 MMHG | SYSTOLIC BLOOD PRESSURE: 162 MMHG | BODY MASS INDEX: 28.73 KG/M2 | WEIGHT: 178.79 LBS | TEMPERATURE: 97.7 F | HEIGHT: 66 IN | RESPIRATION RATE: 16 BRPM | OXYGEN SATURATION: 96 % | HEART RATE: 79 BPM

## 2020-08-18 DIAGNOSIS — R21 RASH: ICD-10-CM

## 2020-08-18 PROCEDURE — 99281 EMR DPT VST MAYX REQ PHY/QHP: CPT

## 2020-08-18 RX ORDER — CETIRIZINE HYDROCHLORIDE 10 MG/1
10 TABLET ORAL DAILY
Qty: 30 TAB | Refills: 0 | Status: ON HOLD | OUTPATIENT
Start: 2020-08-18 | End: 2021-02-28

## 2020-08-18 ASSESSMENT — LIFESTYLE VARIABLES
CONSUMPTION TOTAL: NEGATIVE
TOTAL SCORE: 0
DO YOU DRINK ALCOHOL: NO
HAVE YOU EVER FELT YOU SHOULD CUT DOWN ON YOUR DRINKING: NO
HOW MANY TIMES IN THE PAST YEAR HAVE YOU HAD 5 OR MORE DRINKS IN A DAY: 0
HAVE PEOPLE ANNOYED YOU BY CRITICIZING YOUR DRINKING: NO
EVER HAD A DRINK FIRST THING IN THE MORNING TO STEADY YOUR NERVES TO GET RID OF A HANGOVER: NO
TOTAL SCORE: 0
EVER FELT BAD OR GUILTY ABOUT YOUR DRINKING: NO
TOTAL SCORE: 0
ON A TYPICAL DAY WHEN YOU DRINK ALCOHOL HOW MANY DRINKS DO YOU HAVE: 0
AVERAGE NUMBER OF DAYS PER WEEK YOU HAVE A DRINK CONTAINING ALCOHOL: 0

## 2020-08-18 ASSESSMENT — FIBROSIS 4 INDEX: FIB4 SCORE: 1.67

## 2020-08-18 NOTE — ED PROVIDER NOTES
CHIEF COMPLAINT  Chief Complaint   Patient presents with   • Rash     Diffuse red spotted rash to bilateral arms for 2 1/2 weeks that is very itchy       HPI  Olesya Harmon is a 79 y.o. female who presents with an eczematous appearing rash for the last couple of weeks that is very pruritic.  The patient was recently put on 0.1% triamcinolone and that has not seemed to improve it.  Patient has had no fever.  No trouble breathing or shortness of breath.  There have been no new exposures such as soaps detergents plants pets etc.    REVIEW OF SYSTEMS  No fever, no shortness of breath    PAST MEDICAL HISTORY  Past Medical History:   Diagnosis Date   • Arthritis     left hand   • Arthritis of left hip 10/8/2018   • CATARACT     joan surgery   • CKD (chronic kidney disease) stage 3, GFR 30-59 ml/min ([facility]) 10/2/2019   • Essential hypertension 5/3/2018   • Heart murmur 10/8/2018   • High cholesterol    • Hypercholesterolemia 5/3/2018   • Hypertension    • Hypokalemia 5/3/2018   • Intrinsic eczema 8/14/2020   • Iron deficiency anemia due to chronic blood loss 5/3/2018   • Menopause 6/4/2018   • Mild cognitive impairment with memory loss 11/6/2019   • Mixed conductive and sensorineural hearing loss of both ears 3/31/2020   • Nonrheumatic aortic valve insufficiency 10/8/2018   • Prediabetes 5/3/2018   • Sacroiliitis, not elsewhere classified ([facility]) 6/15/2018    Injected 2017 by Dr. Zollinger       FAMILY HISTORY  Family History   Problem Relation Age of Onset   • Other Mother         cause unknown   • Kidney Disease Father         went off dialysis       SOCIAL HISTORY  Social History     Socioeconomic History   • Marital status:      Spouse name: Not on file   • Number of children: Not on file   • Years of education: Not on file   • Highest education level: Not on file   Occupational History   • Not on file   Social Needs   • Financial resource strain: Not on file   • Food insecurity     Worry: Not on file      Inability: Not on file   • Transportation needs     Medical: Not on file     Non-medical: Not on file   Tobacco Use   • Smoking status: Never Smoker   • Smokeless tobacco: Never Used   Substance and Sexual Activity   • Alcohol use: No   • Drug use: No   • Sexual activity: Never     Partners: Male     Comment: , office supply   Lifestyle   • Physical activity     Days per week: Not on file     Minutes per session: Not on file   • Stress: Not on file   Relationships   • Social connections     Talks on phone: Not on file     Gets together: Not on file     Attends Che[employer] service: Not on file     Active member of club or organization: Not on file     Attends meetings of clubs or organizations: Not on file     Relationship status: Not on file   • Intimate partner violence     Fear of current or ex partner: Not on file     Emotionally abused: Not on file     Physically abused: Not on file     Forced sexual activity: Not on file   Other Topics Concern   • Not on file   Social History Narrative   • Not on file       SURGICAL HISTORY  Past Surgical History:   Procedure Laterality Date   • HIP ARTHROPLASTY TOTAL Left 2/7/2019    Procedure: HIP ARTHROPLASTY TOTAL;  Surgeon: Dion Dennis M.D.;  Location: SURGERY [facility];  Service: Orthopedics   • HIP REVISION TOTAL Right 2/20/2018    Procedure: HIP REVISION TOTAL;  Surgeon: Dion Dennis M.D.;  Location: SURGERY [facility];  Service: Orthopedics   • JOINT INJECTION DIAGNOSTIC Right 1/16/2018    Procedure: JOINT INJECTION DIAGNOSTIC - FOR HIP ASPIRATION;  Surgeon: Dion Dennis M.D.;  Location: SURGERY [facility];  Service: Orthopedics   • CATARACT PHACO WITH IOL  5/9/2013    Performed by Haja Toussaint M.D. at SURGERY [facility]   • HIP ARTHROPLASTY TOTAL  11/21/2011    Performed by ANH HIRSCH at [facility]   • CARPAL TUNNEL REL     • OTHER ORTHOPEDIC SURGERY  2009?    right knee replacement, Sylvie  "      CURRENT MEDICATIONS  Home Medications    **Home medications have not yet been reviewed for this encounter**         ALLERGIES  Allergies   Allergen Reactions   • Codeine Vomiting       PHYSICAL EXAM  VITAL SIGNS: BP (!) 162/73   Pulse 79   Temp 36.5 °C (97.7 °F) (Temporal)   Resp 16   Ht 1.676 m (5' 6\")   Wt 81.1 kg (178 lb 12.7 oz)   SpO2 96%   BMI 28.86 kg/m²      Constitutional: Well developed, Well nourished, No acute distress, Non-toxic appearance.   HENT: Normocephalic, Atraumatic, no posterior pharyngeal swelling  Cardiovascular: Regular pulse  Lungs: No respiratory distress, lungs clear  Skin: Warm, Dry, there is an eczematous rash noted to the upper arms bilaterally, no petechiae purpura or vesicles  Extremities: No edema  Neurologic: Alert, appropriate, follows commands  Psychiatric: Affect normal    COURSE & MEDICAL DECISION MAKING  Pertinent Labs & Imaging studies reviewed. (See chart for details)  This is a 79-year-old who presents with an eczematous rash to her upper extremities.  I will write her for Zyrtec in addition to her triamcinolone and I did recommend an over-the-counter lubricant.  I also recommended limiting showers or at least exposure to the upper extremities to maintain the intrinsic oils and prevent an inflammatory cascade causing this to persist.  The patient will follow up with her doctor in the next week for recheck.    FINAL IMPRESSION  1.  Rash  2.   3.         Electronically signed by: Camden Nicholas M.D., 8/18/2020 9:08 AM        "

## 2020-08-18 NOTE — DISCHARGE INSTRUCTIONS
Please follow-up in 1 week for recheck.  Limit showering.  Lubriderm over-the-counter to the upper extremities in addition to the hydrocortisone.  Zyrtec as written.

## 2020-08-18 NOTE — ED TRIAGE NOTES
Chief Complaint   Patient presents with   • Rash     Diffuse red spotted rash to bilateral arms for 2 1/2 weeks that is very itchy

## 2020-08-18 NOTE — ED NOTES
Assumed of pt upon discharge.   She will be discharged home with instructions to follow up with primary care provider, or return to ED if symptoms worsen, or for any emergent medical process.  She verbalizes understanding of educational materials provided during this visit and agrees to follow our recommendations.  No exacerbations of initial presentations are noted.  Scripts are given upon dismissal from our facility.  Ambulates independently and denies any new needs.  The patient will return for new onset or worsening symptomatology and is stable at the time of discharge. Patient was given return precautions. Patient and/or family member verbalizes understanding and will comply with recommendations.

## 2020-08-19 ENCOUNTER — PATIENT OUTREACH (OUTPATIENT)
Dept: HEALTH INFORMATION MANAGEMENT | Facility: OTHER | Age: 80
End: 2020-08-19

## 2020-08-19 NOTE — PROGRESS NOTES
Lady answered phone and stated I had wrong number. Verified # I was calling and verified through  and [employer].

## 2020-08-20 NOTE — PROGRESS NOTES
Spoke with pt. Declined ED follow up and did not understand information regarding [facility] PA. Son takes care of all her medical. I mailed program and my contact information. No appointments scheduled. Answered no to covid questions she understood.

## 2020-08-24 ENCOUNTER — OFFICE VISIT (OUTPATIENT)
Dept: MEDICAL GROUP | Facility: PHYSICIAN GROUP | Age: 80
End: 2020-08-24
Payer: MEDICARE

## 2020-08-24 DIAGNOSIS — F02.80 OTHER FRONTOTEMPORAL DEMENTIA WITHOUT BEHAVIORAL DISTURBANCE: ICD-10-CM

## 2020-08-24 DIAGNOSIS — G31.09 OTHER FRONTOTEMPORAL DEMENTIA WITHOUT BEHAVIORAL DISTURBANCE: ICD-10-CM

## 2020-08-24 PROBLEM — G31.84 MCI (MILD COGNITIVE IMPAIRMENT): Status: ACTIVE | Noted: 2020-08-24

## 2020-08-24 PROCEDURE — 99215 OFFICE O/P EST HI 40 MIN: CPT | Performed by: FAMILY MEDICINE

## 2020-08-24 ASSESSMENT — FIBROSIS 4 INDEX: FIB4 SCORE: 1.67

## 2020-08-24 NOTE — ASSESSMENT & PLAN NOTE
"This patient presents today accompanied by her son with whom she resides.  Her son made the appointment because he is very concerned about her having memory problems.  He states that currently she is alone all day while he is at work which is typically 8 to 9 hours.  He is concerned that she is unable to take care of herself during that time.  We tried to do a Mini-Mental status exam the patient is unable to participate.  She is also unable to follow a conversation.  She is unable to completely express her thoughts.    .MMSE  -What is the:  Year, season, date, day, month.               1/5 points  -Where are we:   State, [facility], town, hospital, floor.      0/5 points  -3 objects immediate recall.                                             0/3 points  -World backwards or serial sevens.                                 0/5 points  -Three-minute recall.                                                         0/3 points  -Name 2 objects.                                                               0/2 points  -Repeat the following.      \"No ifs and is or buts\"                                                    1/1 point  -Follow a 3 stage command.      Paper right hand, fold in half, place on floor.                 0/3 points  -Follow a written command.       Close your eyes                                                          0/1 point  -Write a complete sentence.                                           0/1 point  -Copy a design.                                                                0/1 point    Total                                                                                  2/30  Cutoff of 24 indicating dementia.    This is a chronic problem for this patient that has been ongoing and worsening over the last year.  Her son is with her today and worried about whether or not she is safe to be at home alone during the day while he is at work.  He does handle the finances and cooks for her but she is " typically alone or at least 8 hours/day.  I would like for him to contact senior [facility] and see if she qualifies for any home care even if it is just through the senior assistance program.  I have also recommended that this patient be seen by the geriatrician Dr. Wade.  Her son will see if that is possible.

## 2020-08-25 VITALS
OXYGEN SATURATION: 96 % | BODY MASS INDEX: 26.83 KG/M2 | DIASTOLIC BLOOD PRESSURE: 72 MMHG | TEMPERATURE: 98.2 F | RESPIRATION RATE: 16 BRPM | WEIGHT: 177 LBS | HEIGHT: 68 IN | HEART RATE: 97 BPM | SYSTOLIC BLOOD PRESSURE: 126 MMHG

## 2020-08-25 PROBLEM — G31.84 MILD COGNITIVE IMPAIRMENT WITH MEMORY LOSS: Status: RESOLVED | Noted: 2019-11-06 | Resolved: 2020-08-25

## 2020-08-25 NOTE — PROGRESS NOTES
"CC:The encounter diagnosis was Other frontotemporal dementia without behavioral disturbance (HCC).    HISTORY OF PRESENT ILLNESS: Patient is a 79 y.o. female established patient who presents today to talk about her dementia.  The patient is accompanied by her son who requested the appointment.  Patient is unable to participate in the appointment.  She actually does not make sense in our conversations.  She cannot carry through a thought all the way through expression.      Other frontotemporal dementia (HCC)  This patient presents today accompanied by her son with whom she resides.  Her son made the appointment because he is very concerned about her having memory problems.  He states that currently she is alone all day while he is at work which is typically 8 to 9 hours.  He is concerned that she is unable to take care of herself during that time.  We tried to do a Mini-Mental status exam the patient is unable to participate.  She is also unable to follow a conversation.  She is unable to completely express her thoughts.    .MMSE  -What is the:  Year, season, date, day, month.               1/5 points  -Where are we:   State, county, town, hospital, floor.      0/5 points  -3 objects immediate recall.                                             0/3 points  -World backwards or serial sevens.                                 0/5 points  -Three-minute recall.                                                         0/3 points  -Name 2 objects.                                                               0/2 points  -Repeat the following.      \"No ifs and is or buts\"                                                    1/1 point  -Follow a 3 stage command.      Paper right hand, fold in half, place on floor.                 0/3 points  -Follow a written command.       Close your eyes                                                          0/1 point  -Write a complete sentence.                                           0/1 " point  -Copy a design.                                                                0/1 point    Total                                                                                  2/30  Cutoff of 24 indicating dementia.    This is a chronic problem for this patient that has been ongoing and worsening over the last year.  Her son is with her today and worried about whether or not she is safe to be at home alone during the day while he is at work.  He does handle the finances and cooks for her but she is typically alone or at least 8 hours/day.  I would like for him to contact [facility] and see if she qualifies for any home care even if it is just through the senior assistance program.  I have also recommended that this patient be seen by the geriatrician Dr. Wade.  Her son will see if that is possible.      Patient Active Problem List    Diagnosis Date Noted   • Other frontotemporal dementia (HCC) 08/24/2020   • Intrinsic eczema 08/14/2020   • Mixed conductive and sensorineural hearing loss of both ears 03/31/2020   • Positive FIT (fecal immunochemical test) 02/03/2020   • CKD (chronic kidney disease) stage 3, GFR 30-59 ml/min ([facility]) 10/02/2019   • Postoperative pain 02/11/2019   • Status post left hip replacement 02/11/2019   • Nonrheumatic aortic valve insufficiency 10/08/2018   • Gastroesophageal reflux disease without esophagitis 06/04/2018   • Menopause 06/04/2018   • Essential hypertension 05/03/2018   • Hypercholesterolemia 05/03/2018   • Prediabetes 05/03/2018      Allergies:Codeine    Current Outpatient Medications   Medication Sig Dispense Refill   • cetirizine (ZYRTEC) 10 MG Tab Take 1 Tab by mouth every day. 30 Tab 0   • triamcinolone acetonide (KENALOG) 0.1 % Cream Apply to affected areas twice a day till clear  Indications: Atopic Dermatitis 60 g 3   • aspirin EC 81 MG EC tablet Take 1 Tab by mouth 2 times a day. 60 Tab 0   • docusate sodium 100 MG Cap Take 100 mg by mouth 2 Times a Day.  "60 Cap 0   • acetaminophen (TYLENOL) 500 MG Tab Take 500 mg by mouth Once.     • vitamin D (CHOLECALCIFEROL) 1000 UNIT Tab Take 1,000 Units by mouth every day.     • Garlic 1000 MG Cap Take 1 Cap by mouth every day.       No current facility-administered medications for this visit.        Social History     Tobacco Use   • Smoking status: Never Smoker   • Smokeless tobacco: Never Used   Substance Use Topics   • Alcohol use: No   • Drug use: No     Social History     Social History Narrative   • Not on file       Family History   Problem Relation Age of Onset   • Other Mother         cause unknown   • Kidney Disease Father         went off dialysis        ROS: This patient has dementia and is unable to participate in review of systems.      Exam:    /72 (BP Location: Right arm, Patient Position: Sitting, BP Cuff Size: Adult)   Pulse 97   Temp 36.8 °C (98.2 °F) (Temporal)   Resp 16   Ht 1.727 m (5' 8\")   Wt 80.3 kg (177 lb)   SpO2 96%  Body mass index is 26.91 kg/m².    General:  Well nourished, well developed female in NAD, who is not oriented to person place or time.  Head is grossly normal.  Neck: Supple without JVD or bruit. Thyroid is not enlarged.  Pulmonary: Clear to ausculation and percussion.  Normal effort. No rales, ronchi, or wheezing.  Cardiovascular: Regular rate and rhythm without murmur. Carotid and radial pulses are intact and equal bilaterally.  Extremities: no clubbing, cyanosis, or edema.  Patient was seen for 40 minutes face to face of which, 35 minutes was spent counseling regarding discussion with the son about his situation and and opportunities with his mom.  Also a discussion of her long-term care..    Please note that this dictation was created using voice recognition software. I have made every reasonable attempt to correct obvious errors, but I expect that there are errors of grammar and possibly content that I did not discover before finalizing the " note.    Assessment/Plan:  1. Other frontotemporal dementia without behavioral disturbance (HCC)  Uncontrolled, since going to work through [facility] and see what services are available to them.  There may come a time where she is not able to care for herself at home.

## 2021-01-08 DIAGNOSIS — Z23 NEED FOR VACCINATION: ICD-10-CM

## 2021-02-25 ENCOUNTER — HOSPITAL ENCOUNTER (OUTPATIENT)
Facility: MEDICAL CENTER | Age: 81
End: 2021-02-28
Attending: EMERGENCY MEDICINE | Admitting: INTERNAL MEDICINE
Payer: MEDICARE

## 2021-02-25 DIAGNOSIS — R41.82 ALTERED MENTAL STATUS, UNSPECIFIED ALTERED MENTAL STATUS TYPE: ICD-10-CM

## 2021-02-25 DIAGNOSIS — R82.71 BACTERIURIA: ICD-10-CM

## 2021-02-25 PROBLEM — F03.90 DEMENTIA (HCC): Status: ACTIVE | Noted: 2021-02-25

## 2021-02-25 LAB
ALBUMIN SERPL BCP-MCNC: 4.2 G/DL (ref 3.2–4.9)
ALBUMIN/GLOB SERPL: 1.8 G/DL
ALP SERPL-CCNC: 85 U/L (ref 30–99)
ALT SERPL-CCNC: 6 U/L (ref 2–50)
ANION GAP SERPL CALC-SCNC: 13 MMOL/L (ref 7–16)
APPEARANCE UR: CLEAR
AST SERPL-CCNC: 21 U/L (ref 12–45)
BACTERIA #/AREA URNS HPF: ABNORMAL /HPF
BASOPHILS # BLD AUTO: 1 % (ref 0–1.8)
BASOPHILS # BLD: 0.05 K/UL (ref 0–0.12)
BILIRUB SERPL-MCNC: 1 MG/DL (ref 0.1–1.5)
BILIRUB UR QL STRIP.AUTO: NEGATIVE
BUN SERPL-MCNC: 16 MG/DL (ref 8–22)
CALCIUM SERPL-MCNC: 9.5 MG/DL (ref 8.4–10.2)
CHLORIDE SERPL-SCNC: 104 MMOL/L (ref 96–112)
CO2 SERPL-SCNC: 24 MMOL/L (ref 20–33)
COLOR UR: ABNORMAL
CREAT SERPL-MCNC: 0.96 MG/DL (ref 0.5–1.4)
EOSINOPHIL # BLD AUTO: 0.05 K/UL (ref 0–0.51)
EOSINOPHIL NFR BLD: 1 % (ref 0–6.9)
EPI CELLS #/AREA URNS HPF: ABNORMAL /HPF
ERYTHROCYTE [DISTWIDTH] IN BLOOD BY AUTOMATED COUNT: 41.8 FL (ref 35.9–50)
GLOBULIN SER CALC-MCNC: 2.4 G/DL (ref 1.9–3.5)
GLUCOSE SERPL-MCNC: 105 MG/DL (ref 65–99)
GLUCOSE UR STRIP.AUTO-MCNC: NEGATIVE MG/DL
HCT VFR BLD AUTO: 44 % (ref 37–47)
HGB BLD-MCNC: 14.6 G/DL (ref 12–16)
IMM GRANULOCYTES # BLD AUTO: 0.01 K/UL (ref 0–0.11)
IMM GRANULOCYTES NFR BLD AUTO: 0.2 % (ref 0–0.9)
KETONES UR STRIP.AUTO-MCNC: NEGATIVE MG/DL
LEUKOCYTE ESTERASE UR QL STRIP.AUTO: ABNORMAL
LYMPHOCYTES # BLD AUTO: 1.26 K/UL (ref 1–4.8)
LYMPHOCYTES NFR BLD: 26 % (ref 22–41)
MCH RBC QN AUTO: 29.6 PG (ref 27–33)
MCHC RBC AUTO-ENTMCNC: 33.2 G/DL (ref 33.6–35)
MCV RBC AUTO: 89.1 FL (ref 81.4–97.8)
MICRO URNS: ABNORMAL
MONOCYTES # BLD AUTO: 0.4 K/UL (ref 0–0.85)
MONOCYTES NFR BLD AUTO: 8.3 % (ref 0–13.4)
NEUTROPHILS # BLD AUTO: 3.07 K/UL (ref 2–7.15)
NEUTROPHILS NFR BLD: 63.5 % (ref 44–72)
NITRITE UR QL STRIP.AUTO: NEGATIVE
NRBC # BLD AUTO: 0 K/UL
NRBC BLD-RTO: 0 /100 WBC
PH UR STRIP.AUTO: 6.5 [PH] (ref 5–8)
PLATELET # BLD AUTO: 293 K/UL (ref 164–446)
PMV BLD AUTO: 8.8 FL (ref 9–12.9)
POTASSIUM SERPL-SCNC: 3.8 MMOL/L (ref 3.6–5.5)
PROCALCITONIN SERPL-MCNC: 0.04 NG/ML
PROT SERPL-MCNC: 6.6 G/DL (ref 6–8.2)
PROT UR QL STRIP: NEGATIVE MG/DL
RBC # BLD AUTO: 4.94 M/UL (ref 4.2–5.4)
RBC UR QL AUTO: NEGATIVE
SODIUM SERPL-SCNC: 141 MMOL/L (ref 135–145)
SP GR UR STRIP.AUTO: <=1.005
TRANS CELLS URNS QL MICRO: ABNORMAL /HPF
WBC # BLD AUTO: 4.8 K/UL (ref 4.8–10.8)
WBC #/AREA URNS HPF: ABNORMAL /HPF

## 2021-02-25 PROCEDURE — G0378 HOSPITAL OBSERVATION PER HR: HCPCS

## 2021-02-25 PROCEDURE — 700101 HCHG RX REV CODE 250: Performed by: INTERNAL MEDICINE

## 2021-02-25 PROCEDURE — 700105 HCHG RX REV CODE 258: Performed by: INTERNAL MEDICINE

## 2021-02-25 PROCEDURE — A9270 NON-COVERED ITEM OR SERVICE: HCPCS | Performed by: INTERNAL MEDICINE

## 2021-02-25 PROCEDURE — 36415 COLL VENOUS BLD VENIPUNCTURE: CPT

## 2021-02-25 PROCEDURE — 700102 HCHG RX REV CODE 250 W/ 637 OVERRIDE(OP): Performed by: INTERNAL MEDICINE

## 2021-02-25 PROCEDURE — 80053 COMPREHEN METABOLIC PANEL: CPT

## 2021-02-25 PROCEDURE — 85025 COMPLETE CBC W/AUTO DIFF WBC: CPT

## 2021-02-25 PROCEDURE — 700111 HCHG RX REV CODE 636 W/ 250 OVERRIDE (IP): Performed by: INTERNAL MEDICINE

## 2021-02-25 PROCEDURE — 99285 EMERGENCY DEPT VISIT HI MDM: CPT

## 2021-02-25 PROCEDURE — 96372 THER/PROPH/DIAG INJ SC/IM: CPT

## 2021-02-25 PROCEDURE — 99220 PR INITIAL OBSERVATION CARE,LEVL III: CPT | Performed by: INTERNAL MEDICINE

## 2021-02-25 PROCEDURE — 84145 PROCALCITONIN (PCT): CPT

## 2021-02-25 PROCEDURE — 700111 HCHG RX REV CODE 636 W/ 250 OVERRIDE (IP): Performed by: EMERGENCY MEDICINE

## 2021-02-25 PROCEDURE — 96365 THER/PROPH/DIAG IV INF INIT: CPT

## 2021-02-25 PROCEDURE — 700105 HCHG RX REV CODE 258: Performed by: EMERGENCY MEDICINE

## 2021-02-25 PROCEDURE — 96367 TX/PROPH/DG ADDL SEQ IV INF: CPT

## 2021-02-25 PROCEDURE — 81001 URINALYSIS AUTO W/SCOPE: CPT

## 2021-02-25 RX ORDER — ACETAMINOPHEN 325 MG/1
650 TABLET ORAL EVERY 6 HOURS PRN
Status: DISCONTINUED | OUTPATIENT
Start: 2021-02-25 | End: 2021-02-28 | Stop reason: HOSPADM

## 2021-02-25 RX ORDER — ONDANSETRON 4 MG/1
4 TABLET, ORALLY DISINTEGRATING ORAL EVERY 4 HOURS PRN
Status: DISCONTINUED | OUTPATIENT
Start: 2021-02-25 | End: 2021-02-28 | Stop reason: HOSPADM

## 2021-02-25 RX ORDER — AMOXICILLIN 250 MG
2 CAPSULE ORAL 2 TIMES DAILY
Status: DISCONTINUED | OUTPATIENT
Start: 2021-02-25 | End: 2021-02-25

## 2021-02-25 RX ORDER — BISACODYL 10 MG
10 SUPPOSITORY, RECTAL RECTAL
Status: DISCONTINUED | OUTPATIENT
Start: 2021-02-25 | End: 2021-02-26

## 2021-02-25 RX ORDER — ONDANSETRON 2 MG/ML
4 INJECTION INTRAMUSCULAR; INTRAVENOUS EVERY 4 HOURS PRN
Status: DISCONTINUED | OUTPATIENT
Start: 2021-02-25 | End: 2021-02-28 | Stop reason: HOSPADM

## 2021-02-25 RX ORDER — POLYETHYLENE GLYCOL 3350 17 G/17G
1 POWDER, FOR SOLUTION ORAL
Status: DISCONTINUED | OUTPATIENT
Start: 2021-02-25 | End: 2021-02-26

## 2021-02-25 RX ORDER — AMOXICILLIN 250 MG
2 CAPSULE ORAL 2 TIMES DAILY
Status: DISCONTINUED | OUTPATIENT
Start: 2021-02-25 | End: 2021-02-26

## 2021-02-25 RX ORDER — SODIUM CHLORIDE 9 MG/ML
INJECTION, SOLUTION INTRAVENOUS CONTINUOUS
Status: DISCONTINUED | OUTPATIENT
Start: 2021-02-25 | End: 2021-02-26

## 2021-02-25 RX ORDER — TRIAMCINOLONE ACETONIDE 1 MG/G
CREAM TOPICAL 2 TIMES DAILY
Status: DISCONTINUED | OUTPATIENT
Start: 2021-02-25 | End: 2021-02-25

## 2021-02-25 RX ORDER — POLYETHYLENE GLYCOL 3350 17 G/17G
1 POWDER, FOR SOLUTION ORAL
Status: DISCONTINUED | OUTPATIENT
Start: 2021-02-25 | End: 2021-02-25

## 2021-02-25 RX ORDER — VITAMIN B COMPLEX
1000 TABLET ORAL DAILY
Status: DISCONTINUED | OUTPATIENT
Start: 2021-02-25 | End: 2021-02-25

## 2021-02-25 RX ORDER — BISACODYL 10 MG
10 SUPPOSITORY, RECTAL RECTAL
Status: DISCONTINUED | OUTPATIENT
Start: 2021-02-25 | End: 2021-02-25

## 2021-02-25 RX ADMIN — SENNOSIDES AND DOCUSATE SODIUM 2 TABLET: 8.6; 5 TABLET ORAL at 18:21

## 2021-02-25 RX ADMIN — POLYETHYLENE GLYCOL 3350 1 PACKET: 17 POWDER, FOR SOLUTION ORAL at 15:04

## 2021-02-25 RX ADMIN — ENOXAPARIN SODIUM 40 MG: 40 INJECTION SUBCUTANEOUS at 15:04

## 2021-02-25 RX ADMIN — SODIUM CHLORIDE: 9 INJECTION, SOLUTION INTRAVENOUS at 14:31

## 2021-02-25 RX ADMIN — SODIUM CHLORIDE 3 G: 900 INJECTION INTRAVENOUS at 12:33

## 2021-02-25 ASSESSMENT — COGNITIVE AND FUNCTIONAL STATUS - GENERAL
CLIMB 3 TO 5 STEPS WITH RAILING: A LOT
EATING MEALS: A LITTLE
MOVING TO AND FROM BED TO CHAIR: A LITTLE
TOILETING: A LITTLE
HELP NEEDED FOR BATHING: A LOT
PERSONAL GROOMING: A LITTLE
TURNING FROM BACK TO SIDE WHILE IN FLAT BAD: A LITTLE
MOVING FROM LYING ON BACK TO SITTING ON SIDE OF FLAT BED: A LITTLE
DRESSING REGULAR LOWER BODY CLOTHING: A LOT
STANDING UP FROM CHAIR USING ARMS: A LITTLE
WALKING IN HOSPITAL ROOM: A LITTLE
MOBILITY SCORE: 17
SUGGESTED CMS G CODE MODIFIER DAILY ACTIVITY: CK
DRESSING REGULAR UPPER BODY CLOTHING: A LITTLE
DAILY ACTIVITIY SCORE: 16
SUGGESTED CMS G CODE MODIFIER MOBILITY: CK

## 2021-02-25 ASSESSMENT — LIFESTYLE VARIABLES
EVER FELT BAD OR GUILTY ABOUT YOUR DRINKING: NO
HAVE PEOPLE ANNOYED YOU BY CRITICIZING YOUR DRINKING: NO
DO YOU DRINK ALCOHOL: NO
ON A TYPICAL DAY WHEN YOU DRINK ALCOHOL HOW MANY DRINKS DO YOU HAVE: 0
AVERAGE NUMBER OF DAYS PER WEEK YOU HAVE A DRINK CONTAINING ALCOHOL: 0
TOTAL SCORE: 0
EVER HAD A DRINK FIRST THING IN THE MORNING TO STEADY YOUR NERVES TO GET RID OF A HANGOVER: NO
HAVE YOU EVER FELT YOU SHOULD CUT DOWN ON YOUR DRINKING: NO
TOTAL SCORE: 0
HOW MANY TIMES IN THE PAST YEAR HAVE YOU HAD 5 OR MORE DRINKS IN A DAY: 0
CONSUMPTION TOTAL: NEGATIVE
TOTAL SCORE: 0
ALCOHOL_USE: NO

## 2021-02-25 ASSESSMENT — FIBROSIS 4 INDEX: FIB4 SCORE: 2.34

## 2021-02-25 NOTE — ED PROVIDER NOTES
ED Provider Note    CHIEF COMPLAINT  Chief Complaint   Patient presents with   • Failure to Thrive       HPI  Olesya Harmon is a 80 y.o. female who presents to the emergency department because of worsening dementia.  She is brought in by her son.  Currently she is being cared for by her son and other family members in her own home.  They have been staying with her.  She was diagnosed with dementia about a year ago.  Getting much worse over the last few months.  Son now having a hard time caring for her.  He found her yesterday wandering outside near the neighbors house.  She denies any complaints and says she feels fine.  She is no complaint of headache.  No focal weakness numbness.  Denies chest pain, shortness of breath, cough.  Has urinary frequency and occasional incontinence.  No dysuria.  Normal bowel movement.  No rash.    Medical record reviewed.  MRI brain about 10 years ago.    REVIEW OF SYSTEMS  As per HPI, otherwise a 10 point review of systems is negative    PAST MEDICAL HISTORY  Past Medical History:   Diagnosis Date   • Arthritis     left hand   • Arthritis of left hip 10/8/2018   • CATARACT     joan surgery   • CKD (chronic kidney disease) stage 3, GFR 30-59 ml/min 10/2/2019   • Essential hypertension 5/3/2018   • Heart murmur 10/8/2018   • High cholesterol    • Hypercholesterolemia 5/3/2018   • Hypertension    • Hypokalemia 5/3/2018   • Intrinsic eczema 8/14/2020   • Iron deficiency anemia due to chronic blood loss 5/3/2018   • Menopause 6/4/2018   • Mild cognitive impairment with memory loss 11/6/2019   • Mixed conductive and sensorineural hearing loss of both ears 3/31/2020   • Nonrheumatic aortic valve insufficiency 10/8/2018   • Prediabetes 5/3/2018   • Sacroiliitis, not elsewhere classified (HCC) 6/15/2018    Injected 2017 by Dr. Zollinger       SOCIAL HISTORY  Social History     Tobacco Use   • Smoking status: Never Smoker   • Smokeless tobacco: Never Used   Substance Use Topics   • Alcohol  "use: No   • Drug use: No       SURGICAL HISTORY  Past Surgical History:   Procedure Laterality Date   • HIP ARTHROPLASTY TOTAL Left 2/7/2019    Procedure: HIP ARTHROPLASTY TOTAL;  Surgeon: Dion Dennis M.D.;  Location: SURGERY [facility];  Service: Orthopedics   • HIP REVISION TOTAL Right 2/20/2018    Procedure: HIP REVISION TOTAL;  Surgeon: Dion Dennis M.D.;  Location: SURGERY [facility];  Service: Orthopedics   • JOINT INJECTION DIAGNOSTIC Right 1/16/2018    Procedure: JOINT INJECTION DIAGNOSTIC - FOR HIP ASPIRATION;  Surgeon: Dion Dennis M.D.;  Location: SURGERY [facility];  Service: Orthopedics   • CATARACT PHACO WITH IOL  5/9/2013    Performed by Haja Toussaint M.D. at [facility]   • HIP ARTHROPLASTY TOTAL  11/21/2011    Performed by ANH HIRSCH at [facility] ORS   • CARPAL TUNNEL REL     • OTHER ORTHOPEDIC SURGERY  2009?    right knee replacement, [place]       CURRENT MEDICATIONS  Home Medications    **Home medications have not yet been reviewed for this encounter**         ALLERGIES  Allergies   Allergen Reactions   • Codeine Vomiting       PHYSICAL EXAM  VITAL SIGNS: BP (!) 179/86   Pulse 73   Temp 36.7 °C (98.1 °F) (Temporal)   Resp 16   Ht 1.676 m (5' 6\")   Wt 78.6 kg (173 lb 4.5 oz)   SpO2 96%   BMI 27.97 kg/m²    Constitutional: Awake and alert  HENT: Normal inspection  Eyes: Normal inspection  Neck: Grossly normal range of motion.  Cardiovascular: Normal heart rate, Normal rhythm.  Symmetric peripheral pulses.   Thorax & Lungs: No respiratory distress, No wheezing, No rales, No rhonchi, No chest tenderness.   Abdomen: Bowel sounds normal, soft, non-distended, nontender, no mass  Skin: No obvious rash.  Back: No tenderness, No CVA tenderness.   Extremities: No asymmetric swelling  Neurologic: She is oriented to her first name.  Cannot recall her last name.  Does not know the date.  She knows she is in the hospital.  She is clear " speech normal cranial nerves.  Symmetric strength throughout normal sensory.      Labs:  Results for orders placed or performed during the hospital encounter of 02/25/21   CBC WITH DIFFERENTIAL   Result Value Ref Range    WBC 4.8 4.8 - 10.8 K/uL    RBC 4.94 4.20 - 5.40 M/uL    Hemoglobin 14.6 12.0 - 16.0 g/dL    Hematocrit 44.0 37.0 - 47.0 %    MCV 89.1 81.4 - 97.8 fL    MCH 29.6 27.0 - 33.0 pg    MCHC 33.2 (L) 33.6 - 35.0 g/dL    RDW 41.8 35.9 - 50.0 fL    Platelet Count 293 164 - 446 K/uL    MPV 8.8 (L) 9.0 - 12.9 fL    Neutrophils-Polys 63.50 44.00 - 72.00 %    Lymphocytes 26.00 22.00 - 41.00 %    Monocytes 8.30 0.00 - 13.40 %    Eosinophils 1.00 0.00 - 6.90 %    Basophils 1.00 0.00 - 1.80 %    Immature Granulocytes 0.20 0.00 - 0.90 %    Nucleated RBC 0.00 /100 WBC    Neutrophils (Absolute) 3.07 2.00 - 7.15 K/uL    Lymphs (Absolute) 1.26 1.00 - 4.80 K/uL    Monos (Absolute) 0.40 0.00 - 0.85 K/uL    Eos (Absolute) 0.05 0.00 - 0.51 K/uL    Baso (Absolute) 0.05 0.00 - 0.12 K/uL    Immature Granulocytes (abs) 0.01 0.00 - 0.11 K/uL    NRBC (Absolute) 0.00 K/uL   COMP METABOLIC PANEL   Result Value Ref Range    Sodium 141 135 - 145 mmol/L    Potassium 3.8 3.6 - 5.5 mmol/L    Chloride 104 96 - 112 mmol/L    Co2 24 20 - 33 mmol/L    Anion Gap 13.0 7.0 - 16.0    Glucose 105 (H) 65 - 99 mg/dL    Bun 16 8 - 22 mg/dL    Creatinine 0.96 0.50 - 1.40 mg/dL    Calcium 9.5 8.4 - 10.2 mg/dL    AST(SGOT) 21 12 - 45 U/L    ALT(SGPT) 6 2 - 50 U/L    Alkaline Phosphatase 85 30 - 99 U/L    Total Bilirubin 1.0 0.1 - 1.5 mg/dL    Albumin 4.2 3.2 - 4.9 g/dL    Total Protein 6.6 6.0 - 8.2 g/dL    Globulin 2.4 1.9 - 3.5 g/dL    A-G Ratio 1.8 g/dL   URINALYSIS CULTURE, IF INDICATED    Specimen: Blood   Result Value Ref Range    Color Straw     Character Clear     Specific Gravity <=1.005 <1.035    Ph 6.5 5.0 - 8.0    Glucose Negative Negative mg/dL    Ketones Negative Negative mg/dL    Protein Negative Negative mg/dL    Bilirubin Negative  Negative    Nitrite Negative Negative    Leukocyte Esterase Small (A) Negative    Occult Blood Negative Negative    Micro Urine Req Microscopic    ESTIMATED GFR   Result Value Ref Range    GFR If African American >60 >60 mL/min/1.73 m 2    GFR If Non  56 (A) >60 mL/min/1.73 m 2   URINE MICROSCOPIC (W/UA)   Result Value Ref Range    WBC 5-10 (A) /hpf    Bacteria Moderate (A) None /hpf    Epithelial Cells Few Few /hpf    Trans Epithelial Cells Rare /hpf       COURSE & MEDICAL DECISION MAKING  Patient presents with altered mental status suspected worsening dementia.  Screening for medical illness.  Identified to have bacteriuria.  Describes having incontinence and frequency.  Urinary tract infection will be treated with Unasyn.  No clinical findings consistent with sepsis.  Because of delirium will be admitted to the hospital.  Hospitalist consulted.    FINAL IMPRESSION  1.  Delirium in the setting of worsening dementia  2.  Bacteriuria      This dictation was created using voice recognition software. The accuracy of the dictation is limited to the abilities of the software.  The nursing notes were reviewed and certain aspects of this information were incorporated into this note.      Electronically signed by: Yoan Rich M.D., 2/25/2021 10:24 AM

## 2021-02-25 NOTE — ED NOTES
Pharmacy Medication Reconciliation      Medication reconciliation updated and complete per pt family at bedside  Allergies have been verified and updated  No oral ABX within the last 14 days  Patient home pharmacy:[facility]

## 2021-02-25 NOTE — ED TRIAGE NOTES
Pt BIB Son  Pt reports feeling well  Son states patient has been wondering and was found at neighbors house yesterday  Pt was dx with dementia in December    Pt updated on triage process and asked to inform RN of any changes while waiting in lobby.

## 2021-02-25 NOTE — ASSESSMENT & PLAN NOTE
The patient knows her first name only, she is not oriented to date time or person.  The patient lives with her son and niece and her needs can no longer be met in home environment  Moderate to advanced dementia FAST6c  TSH B12 and syphilis WNL  MMSE severe cognitive impairment  Seroquel QHS  Haldol PRN

## 2021-02-25 NOTE — ASSESSMENT & PLAN NOTE
Systolic around 160s  Due to poor prognosis and advanced dementia, will be less aggressive with the treatment

## 2021-02-25 NOTE — PROGRESS NOTES
2 RN skin check complete with TOM Tilley RN.  Devices in place peripheral IV.  Skin assessed under devices N/A, un blanching redness to sacrum, blanchable redness under L breast.  Confirmed pressure ulcers found on sacrum.   New potential pressure ulcers noted on N/A. Wound consult placed and wound reported.   The following interventions in place barrier cream, mepilex to sacrum, Q2 turns.

## 2021-02-25 NOTE — H&P
Hospital Medicine History & Physical Note    Date of Service  2/25/2021    Primary Care Physician  No primary care provider on file.    Consultants  None    Code Status  Full Code    Chief Complaint  Chief Complaint   Patient presents with   • Failure to Thrive       History of Presenting Illness:    80-year-old female with history of chronic kidney disease dyslipidemia and dementia presented 2/25 with her son due to worsening her dementia.  The patient has advanced dementia not able to provide history, she is oriented to her name only, not oriented to place or time according to her son that her baseline.  According to her son the patient has had worsening dementia for last year, she was seen by primary care doctor, no fever or chills no significant abdominal pain or diarrhea, the patient lives with her son and niece, she needs 24/7 supervision and her family not able to provide the care she needs.  On admission labs did not show any signs of infection, normal white blood cell with stable kidney function, patient was received 1 dose of Unasyn in ED.    The code status was discussed with her son(POA) and he confirmed DNR/DNI    Review of Systems  Review of Systems   Unable to perform ROS: Dementia       Past Medical History   has a past medical history of Arthritis, Arthritis of left hip (10/8/2018), CATARACT, CKD (chronic kidney disease) stage 3, GFR 30-59 ml/min (10/2/2019), Essential hypertension (5/3/2018), Heart murmur (10/8/2018), High cholesterol, Hypercholesterolemia (5/3/2018), Hypertension, Hypokalemia (5/3/2018), Intrinsic eczema (8/14/2020), Iron deficiency anemia due to chronic blood loss (5/3/2018), Menopause (6/4/2018), Mild cognitive impairment with memory loss (11/6/2019), Mixed conductive and sensorineural hearing loss of both ears (3/31/2020), Nonrheumatic aortic valve insufficiency (10/8/2018), Prediabetes (5/3/2018), and Sacroiliitis, not elsewhere classified (HCC) (6/15/2018).    Surgical  History   has a past surgical history that includes carpal tunnel rel; hip arthroplasty total (11/21/2011); cataract phaco with iol (5/9/2013); joint injection diagnostic (Right, 1/16/2018); other orthopedic surgery (2009?); hip revision total (Right, 2/20/2018); and hip arthroplasty total (Left, 2/7/2019).     Family History  family history includes Kidney Disease in her father; Other in her mother.     Social History   reports that she has never smoked. She has never used smokeless tobacco. She reports that she does not drink alcohol and does not use drugs.    Allergies  Allergies   Allergen Reactions   • Codeine Vomiting       Medications  Prior to Admission Medications   Prescriptions Last Dose Informant Patient Reported? Taking?   Garlic 1000 MG Cap  Patient Yes No   Sig: Take 1 Cap by mouth every day.   acetaminophen (TYLENOL) 500 MG Tab  Patient Yes No   Sig: Take 500 mg by mouth Once.   aspirin EC 81 MG EC tablet   No No   Sig: Take 1 Tab by mouth 2 times a day.   cetirizine (ZYRTEC) 10 MG Tab   No No   Sig: Take 1 Tab by mouth every day.   docusate sodium 100 MG Cap   No No   Sig: Take 100 mg by mouth 2 Times a Day.   triamcinolone acetonide (KENALOG) 0.1 % Cream   No No   Sig: Apply to affected areas twice a day till clear  Indications: Atopic Dermatitis   vitamin D (CHOLECALCIFEROL) 1000 UNIT Tab  Patient Yes No   Sig: Take 1,000 Units by mouth every day.      Facility-Administered Medications: None       Physical Exam  Temp:  [36.7 °C (98.1 °F)] 36.7 °C (98.1 °F)  Pulse:  [64-87] 64  Resp:  [16] 16  BP: (142-196)/() 180/65  SpO2:  [95 %-98 %] 96 %    Physical Exam  Constitutional:       Appearance: She is not ill-appearing.   HENT:      Head: Normocephalic and atraumatic.   Eyes:      General: No scleral icterus.  Cardiovascular:      Rate and Rhythm: Normal rate.      Heart sounds: No murmur.   Pulmonary:      Effort: No respiratory distress.      Breath sounds: No wheezing.   Abdominal:       General: Abdomen is flat. There is no distension.      Palpations: Abdomen is soft.      Tenderness: There is no abdominal tenderness. There is no right CVA tenderness.   Neurological:      General: No focal deficit present.      Mental Status: She is alert. She is disoriented.      Cranial Nerves: No cranial nerve deficit.      Motor: No weakness.      Comments: The patient knows her first name only, she is not oriented to place date or time.   Psychiatric:         Mood and Affect: Mood normal.         Laboratory:  Recent Labs     02/25/21  1032   WBC 4.8   RBC 4.94   HEMOGLOBIN 14.6   HEMATOCRIT 44.0   MCV 89.1   MCH 29.6   MCHC 33.2*   RDW 41.8   PLATELETCT 293   MPV 8.8*     Recent Labs     02/25/21  1032   SODIUM 141   POTASSIUM 3.8   CHLORIDE 104   CO2 24   GLUCOSE 105*   BUN 16   CREATININE 0.96   CALCIUM 9.5     Recent Labs     02/25/21  1032   ALTSGPT 6   ASTSGOT 21   ALKPHOSPHAT 85   TBILIRUBIN 1.0   GLUCOSE 105*         No results for input(s): NTPROBNP in the last 72 hours.      No results for input(s): TROPONINT in the last 72 hours.    Imaging:  No orders to display         Assessment/Plan:  I anticipate this patient is appropriate for observation status at this time.    * Dementia (HCC)  Assessment & Plan  The patient knows her first name only, she is not oriented to date time or person.  The patient lives with her son and niece.  Moderate to advanced dementia FAST6c  The patient needs 24/7 supervision due to advanced dementia and family not able to provide that.  No significant changes on her mental status and no significant delirium  No need for antibiotics or CT scan for her head  PT/OT and SLP  Check TSH B12 and syphilis  We will consult  for possible placement/long-term/  The patient is at high risk for delirium, continue pharmacological treatment avoid antihistamine and benzo      CKD (chronic kidney disease) stage 3, GFR 30-59 ml/min- (present on admission)  Assessment &  Plan  Around baseline and GFR around 50s  Avoid nephrotoxic medication    Essential hypertension- (present on admission)  Assessment & Plan  Systolic around 160s  Due to poor prognosis and advanced dementia, will be less aggressive with the treatment            Interventions to be considered in all patients in order to minimize the risk of delirium.   -do not disturb patient (vitals or lab draws) between the hours of 10 PM and 6 AM.  -ideally the patient should not sleep during the day and we should avoid day time naps.   -up in chair for meals  -ambulate at least three times daily, as able  -watch for constipation  -timed voiding - ask patient is she would like to go to the bathroom q 2-3 hours, except during the do not disturb hours.   -remove all necessary lines (central lines, peripheral IVs, feeding tubes, chowdhury catheters)  -unless patient has shown harm to self or others I would recommend against use of restraints - either chemical or physical (antipsychotics)   -minimize polypharmacy, do not dose medication during sleep hours

## 2021-02-26 LAB
ANION GAP SERPL CALC-SCNC: 10 MMOL/L (ref 7–16)
BASOPHILS # BLD AUTO: 1.2 % (ref 0–1.8)
BASOPHILS # BLD: 0.05 K/UL (ref 0–0.12)
BUN SERPL-MCNC: 17 MG/DL (ref 8–22)
CALCIUM SERPL-MCNC: 8.6 MG/DL (ref 8.4–10.2)
CHLORIDE SERPL-SCNC: 108 MMOL/L (ref 96–112)
CO2 SERPL-SCNC: 25 MMOL/L (ref 20–33)
CREAT SERPL-MCNC: 0.9 MG/DL (ref 0.5–1.4)
EOSINOPHIL # BLD AUTO: 0.09 K/UL (ref 0–0.51)
EOSINOPHIL NFR BLD: 2.1 % (ref 0–6.9)
ERYTHROCYTE [DISTWIDTH] IN BLOOD BY AUTOMATED COUNT: 42 FL (ref 35.9–50)
GLUCOSE SERPL-MCNC: 100 MG/DL (ref 65–99)
HCT VFR BLD AUTO: 39.5 % (ref 37–47)
HGB BLD-MCNC: 12.9 G/DL (ref 12–16)
IMM GRANULOCYTES # BLD AUTO: 0.02 K/UL (ref 0–0.11)
IMM GRANULOCYTES NFR BLD AUTO: 0.5 % (ref 0–0.9)
LYMPHOCYTES # BLD AUTO: 1.31 K/UL (ref 1–4.8)
LYMPHOCYTES NFR BLD: 30.9 % (ref 22–41)
MCH RBC QN AUTO: 29.1 PG (ref 27–33)
MCHC RBC AUTO-ENTMCNC: 32.7 G/DL (ref 33.6–35)
MCV RBC AUTO: 89 FL (ref 81.4–97.8)
MONOCYTES # BLD AUTO: 0.52 K/UL (ref 0–0.85)
MONOCYTES NFR BLD AUTO: 12.3 % (ref 0–13.4)
NEUTROPHILS # BLD AUTO: 2.25 K/UL (ref 2–7.15)
NEUTROPHILS NFR BLD: 53 % (ref 44–72)
NRBC # BLD AUTO: 0 K/UL
NRBC BLD-RTO: 0 /100 WBC
PLATELET # BLD AUTO: 238 K/UL (ref 164–446)
PMV BLD AUTO: 9.2 FL (ref 9–12.9)
POTASSIUM SERPL-SCNC: 3.7 MMOL/L (ref 3.6–5.5)
RBC # BLD AUTO: 4.44 M/UL (ref 4.2–5.4)
SODIUM SERPL-SCNC: 143 MMOL/L (ref 135–145)
TREPONEMA PALLIDUM IGG+IGM AB [PRESENCE] IN SERUM OR PLASMA BY IMMUNOASSAY: NORMAL
TSH SERPL DL<=0.005 MIU/L-ACNC: 1.79 UIU/ML (ref 0.38–5.33)
VIT B12 SERPL-MCNC: 295 PG/ML (ref 211–911)
WBC # BLD AUTO: 4.2 K/UL (ref 4.8–10.8)

## 2021-02-26 PROCEDURE — 86780 TREPONEMA PALLIDUM: CPT

## 2021-02-26 PROCEDURE — 97162 PT EVAL MOD COMPLEX 30 MIN: CPT

## 2021-02-26 PROCEDURE — 99225 PR SUBSEQUENT OBSERVATION CARE,LEVEL II: CPT | Performed by: INTERNAL MEDICINE

## 2021-02-26 PROCEDURE — 96372 THER/PROPH/DIAG INJ SC/IM: CPT

## 2021-02-26 PROCEDURE — 82607 VITAMIN B-12: CPT

## 2021-02-26 PROCEDURE — 80048 BASIC METABOLIC PNL TOTAL CA: CPT

## 2021-02-26 PROCEDURE — 97165 OT EVAL LOW COMPLEX 30 MIN: CPT

## 2021-02-26 PROCEDURE — 85025 COMPLETE CBC W/AUTO DIFF WBC: CPT

## 2021-02-26 PROCEDURE — A9270 NON-COVERED ITEM OR SERVICE: HCPCS | Performed by: INTERNAL MEDICINE

## 2021-02-26 PROCEDURE — G0378 HOSPITAL OBSERVATION PER HR: HCPCS

## 2021-02-26 PROCEDURE — 84443 ASSAY THYROID STIM HORMONE: CPT

## 2021-02-26 PROCEDURE — 700111 HCHG RX REV CODE 636 W/ 250 OVERRIDE (IP): Performed by: INTERNAL MEDICINE

## 2021-02-26 PROCEDURE — 700105 HCHG RX REV CODE 258: Performed by: INTERNAL MEDICINE

## 2021-02-26 PROCEDURE — 700102 HCHG RX REV CODE 250 W/ 637 OVERRIDE(OP): Performed by: INTERNAL MEDICINE

## 2021-02-26 RX ORDER — POLYETHYLENE GLYCOL 3350 17 G/17G
1 POWDER, FOR SOLUTION ORAL
Status: DISCONTINUED | OUTPATIENT
Start: 2021-02-26 | End: 2021-02-28 | Stop reason: HOSPADM

## 2021-02-26 RX ORDER — AMOXICILLIN 250 MG
2 CAPSULE ORAL DAILY
Status: DISCONTINUED | OUTPATIENT
Start: 2021-02-27 | End: 2021-02-28 | Stop reason: HOSPADM

## 2021-02-26 RX ORDER — BISACODYL 10 MG
10 SUPPOSITORY, RECTAL RECTAL
Status: DISCONTINUED | OUTPATIENT
Start: 2021-02-26 | End: 2021-02-28 | Stop reason: HOSPADM

## 2021-02-26 RX ORDER — HALOPERIDOL 5 MG/ML
5 INJECTION INTRAMUSCULAR ONCE
Status: ACTIVE | OUTPATIENT
Start: 2021-02-26 | End: 2021-02-27

## 2021-02-26 RX ADMIN — SODIUM CHLORIDE: 9 INJECTION, SOLUTION INTRAVENOUS at 03:58

## 2021-02-26 RX ADMIN — ENOXAPARIN SODIUM 40 MG: 40 INJECTION SUBCUTANEOUS at 05:49

## 2021-02-26 RX ADMIN — SENNOSIDES AND DOCUSATE SODIUM 2 TABLET: 8.6; 5 TABLET ORAL at 05:49

## 2021-02-26 ASSESSMENT — COGNITIVE AND FUNCTIONAL STATUS - GENERAL
SUGGESTED CMS G CODE MODIFIER MOBILITY: CJ
DRESSING REGULAR LOWER BODY CLOTHING: A LITTLE
CLIMB 3 TO 5 STEPS WITH RAILING: A LOT
HELP NEEDED FOR BATHING: A LITTLE
TOILETING: A LITTLE
MOBILITY SCORE: 22
DAILY ACTIVITIY SCORE: 21
SUGGESTED CMS G CODE MODIFIER DAILY ACTIVITY: CJ

## 2021-02-26 ASSESSMENT — GAIT ASSESSMENTS
DISTANCE (FEET): 200
DEVIATION: STEP TO
ASSISTIVE DEVICE: 4 WHEEL WALKER
GAIT LEVEL OF ASSIST: SUPERVISED

## 2021-02-26 ASSESSMENT — ACTIVITIES OF DAILY LIVING (ADL): TOILETING: UNABLE TO DETERMINE AT THIS TIME

## 2021-02-26 ASSESSMENT — PAIN DESCRIPTION - PAIN TYPE: TYPE: ACUTE PAIN

## 2021-02-26 NOTE — CARE PLAN
Problem: Safety  Goal: Will remain free from falls  2/26/2021 1257 by Hans Gamez R.N.  Outcome: PROGRESSING AS EXPECTED  Note: Fall precautions in place, bed alarm on, patient remains free from falls  2/26/2021 1252 by Hans Gamez R.N.  Outcome: PROGRESSING AS EXPECTED     Problem: Bowel/Gastric:  Goal: Normal bowel function is maintained or improved  2/26/2021 1257 by Hans Gamez R.N.  Outcome: PROGRESSING AS EXPECTED  Flowsheets  Taken 2/26/2021 1257 by Hans Gamez R.N.  Number of Times Stooled: 1  Taken 2/26/2021 1100 by Iliana Mohan CBrendanNLENNY  Last BM: 02/26/21  Note: Loose bowel movement x 1 today  2/26/2021 1252 by Hans Gamez R.N.  Outcome: PROGRESSING AS EXPECTED

## 2021-02-26 NOTE — CARE PLAN
Problem: Communication  Goal: The ability to communicate needs accurately and effectively will improve  Outcome: PROGRESSING AS EXPECTED     Problem: Safety  Goal: Will remain free from injury  Outcome: PROGRESSING AS EXPECTED  Goal: Will remain free from falls  Outcome: PROGRESSING AS EXPECTED     Problem: Infection  Goal: Will remain free from infection  Outcome: PROGRESSING AS EXPECTED     Problem: Venous Thromboembolism (VTW)/Deep Vein Thrombosis (DVT) Prevention:  Goal: Patient will participate in Venous Thrombosis (VTE)/Deep Vein Thrombosis (DVT)Prevention Measures  Outcome: PROGRESSING AS EXPECTED     Problem: Bowel/Gastric:  Goal: Normal bowel function is maintained or improved  Outcome: PROGRESSING AS EXPECTED  Goal: Will not experience complications related to bowel motility  Outcome: PROGRESSING AS EXPECTED     Problem: Knowledge Deficit  Goal: Knowledge of disease process/condition, treatment plan, diagnostic tests, and medications will improve  Outcome: PROGRESSING AS EXPECTED  Goal: Knowledge of the prescribed therapeutic regimen will improve  Outcome: PROGRESSING AS EXPECTED     Problem: Discharge Barriers/Planning  Goal: Patient's continuum of care needs will be met  Outcome: PROGRESSING AS EXPECTED     Problem: Psychosocial Needs:  Goal: Level of anxiety will decrease  Outcome: PROGRESSING AS EXPECTED     Problem: Urinary Elimination:  Goal: Ability to reestablish a normal urinary elimination pattern will improve  Outcome: PROGRESSING AS EXPECTED     Problem: Skin Integrity  Goal: Risk for impaired skin integrity will decrease  Outcome: PROGRESSING AS EXPECTED

## 2021-02-26 NOTE — PROGRESS NOTES
Pt arrived to unit via gurney. Ambulated from gurney to bed, one assist. Tele monitor applied, vitals taken. Pt assessed. A&Ox1. Admit profile and med rec complete. Discussed POC with pt, including IV fluids, vitals, monitoring. Welcome folder provided and discussed. Communication board filled out. Questions and concerns addressed, verbalized understanding. Fall precautions in place. Pt demonstrates ability to use call light appropriately. Pt left in lowest position. Bed locked and low, bed alarm on.

## 2021-02-26 NOTE — PROGRESS NOTES
Report received from MAXIMINO Lay. Plan of care discussed at patient's bedside. Whiteboard has been updated. Pt was reoriented to the floor and repositioned in bed. Pt declines any further needs at this time. Bed alarm is set, in locked and lowest position, and call light is within reach.

## 2021-02-26 NOTE — PROGRESS NOTES
[facility] Medicine Daily Progress Note    Date of Service  2/26/2021    Chief Complaint  80 y.o. female admitted 2/25/2021 with worsening dementia    Hospital Course  No notes on file    Interval Problem Update  Patient was seen and examined at bedside.  No acute events overnight. Patient is resting comfortably in bed and in no acute distress.     Consultants/Specialty  none    Code Status  DNAR/DNI    Disposition  TBD    Review of Systems  Review of Systems   Unable to perform ROS: Dementia        Physical Exam  Temp:  [36.4 °C (97.5 °F)-36.6 °C (97.8 °F)] 36.4 °C (97.5 °F)  Pulse:  [66-75] 66  Resp:  [18] 18  BP: (126-162)/(62-91) 162/62  SpO2:  [96 %-99 %] 96 %    Physical Exam  Constitutional:       General: She is not in acute distress.     Appearance: She is not ill-appearing or toxic-appearing.   HENT:      Head: Normocephalic and atraumatic.      Right Ear: External ear normal.      Left Ear: External ear normal.      Nose: No congestion.      Mouth/Throat:      Pharynx: No oropharyngeal exudate.   Eyes:      General: No scleral icterus.     Pupils: Pupils are equal, round, and reactive to light.   Cardiovascular:      Rate and Rhythm: Normal rate.      Heart sounds: No murmur.   Pulmonary:      Effort: No respiratory distress.      Breath sounds: No wheezing.   Abdominal:      General: Abdomen is flat.      Palpations: Abdomen is soft.      Tenderness: There is no abdominal tenderness. There is no guarding or rebound.   Musculoskeletal:         General: No swelling or deformity.   Skin:     Coloration: Skin is not jaundiced.      Findings: No bruising.   Neurological:      General: No focal deficit present.      Mental Status: She is alert. She is disoriented.      Comments: Moves all extremities spontaneously     Psychiatric:         Cognition and Memory: Memory is impaired. She exhibits impaired recent memory.      Comments: A&O x1 (self)         Fluids    Intake/Output Summary (Last 24 hours) at 2/26/2021  1354  Last data filed at 2/26/2021 0959  Gross per 24 hour   Intake 150 ml   Output --   Net 150 ml       Laboratory  Recent Labs     02/25/21  1032 02/26/21  0431   WBC 4.8 4.2*   RBC 4.94 4.44   HEMOGLOBIN 14.6 12.9   HEMATOCRIT 44.0 39.5   MCV 89.1 89.0   MCH 29.6 29.1   MCHC 33.2* 32.7*   RDW 41.8 42.0   PLATELETCT 293 238   MPV 8.8* 9.2     Recent Labs     02/25/21  1032 02/26/21  0431   SODIUM 141 143   POTASSIUM 3.8 3.7   CHLORIDE 104 108   CO2 24 25   GLUCOSE 105* 100*   BUN 16 17   CREATININE 0.96 0.90   CALCIUM 9.5 8.6                   Imaging  No orders to display        Assessment/Plan  * Dementia (HCC)  Assessment & Plan  The patient knows her first name only, she is not oriented to date time or person.  The patient lives with her son and niece and her needs can no longer be met in home environment  Moderate to advanced dementia FAST6c  No significant changes on her mental status and no significant delirium  No need for antibiotics or CT scan for her head  PT/OT and SLP  Check TSH B12 and syphilis  We will consult  for possible placement/long-term/  The patient is at high risk for delirium, continue pharmacological treatment avoid antihistamine and benzo      CKD (chronic kidney disease) stage 3, GFR 30-59 ml/min- (present on admission)  Assessment & Plan  Around baseline and GFR around 50s  Avoid nephrotoxic medication    Essential hypertension- (present on admission)  Assessment & Plan  Systolic around 160s  Due to poor prognosis and advanced dementia, will be less aggressive with the treatment         VTE prophylaxis: Lovenox

## 2021-02-26 NOTE — CARE PLAN
Problem: Nutritional:  Goal: Achieve adequate nutritional intake  Description: Patient will consume >50% of meals and supplements  Outcome: PROGRESSING SLOWER THAN EXPECTED

## 2021-02-26 NOTE — THERAPY
Occupational Therapy   Initial Evaluation     Patient Name: Olesya Harmon  Age:  80 y.o., Sex:  female  Medical Record #: [id number]  Today's Date: 2/26/2021     Precautions: (P) Fall Risk    Assessment  Patient is 80 y.o. female admitted with FTT, increased confusion. A&Ox1, pleasant and cooperative. Show functional mobility with Sup; walks in room and in halls with her 4ww. Toileting after bm- Estuardo. LB dressing with Sup. Grooms, feeding with Sup. Main limitation is decreased cognition. Seems to be near baseline. No further acute OT needs at this setting. Family lives with pt; if unable to provide 24/7 supervision pt may benefit from placement.         Plan  Recommend Occupational Therapy for Evaluation only.  DC Equipment Recommendations: (P) Unable to determine at this time  Discharge Recommendations: (P) Recommend home health for continued occupational therapy services and 24/7 Sup from family. If unable, pt may benefit from snf,[facility], [facility].        02/26/21 2999   Prior Living Situation   Prior Services Continuous (24 Hour) Care Giving Family   Housing / Facility Unable To Determine At This Time   Equipment Owned 4-Wheel Walker;Unable to Determine At This Time   Lives with - Patient's Self Care Capacity Adult Children   Comments Per H&P, son and family live with pt.   Prior Level of ADL Function   Self Feeding Independent   Grooming / Hygiene Independent   Bathing Unable To Determine At This Time   Dressing Unable To Determine At This Time   Toileting Unable To Determine At This Time   Prior Level of IADL Function   Medication Management Requires Assist   Laundry Requires Assist   Kitchen Mobility Requires Assist   Finances Requires Assist   Home Management Requires Assist   Shopping Requires Assist   Prior Level Of Mobility Independent With Device in Home   Driving / Transportation Relatives / Others Provide Transportation   Occupation (Pre-Hospital Vocational) Not Employed   Leisure Interests Unable To  Determine At This Time   Cognition    Comments Ox1. Follows 75% 1-step commands.   Balance Assessment   Sitting Balance (Static) Good   Sitting Balance (Dynamic) Fair +   Standing Balance (Static) Fair +   Standing Balance (Dynamic) Fair   Weight Shift Sitting Good   Weight Shift Standing Good   Comments 4ww   Bed Mobility    Supine to Sit Supervised   Sit to Supine Supervised   Scooting Supervised   ADL Assessment   Eating Modified Independent   Grooming Supervision;Standing   Upper Body Dressing Supervision   Lower Body Dressing Supervision   Toileting Minimal Assist   Functional Mobility   Sit to Stand Supervised   Bed, Chair, Wheelchair Transfer Supervised   Toilet Transfers Supervised   Transfer Method Stand Step   Interdisciplinary Plan of Care Collaboration   Collaboration Comments results. bm. pt appears to be at baseline level- needing 24/7 Sup. If family unable to provide, may benefit from snf,grp home, senior care?

## 2021-02-26 NOTE — THERAPY
Speech Therapy Contact Note    Patient Name: Olesya Harmon  Age:  80 y.o., Sex:  female  Medical Record #: [id number]  Today's Date: 2/26/2021    Discussed missed therapy with RN       02/26/21 0909   Treatment Variance   Reason For Missed Therapy Non-Medical - Other (Please Comment)  (cog eval not indicated per MD)   Total Time Spent   Total Time Spent (Mins) 10   Initial Contact Note    Initial Contact Note  Order Received and Verified. Speech Therapy Evaluation NOT Completed Because Patient Does Not Require Acute Speech Therapy at this Time.   Interdisciplinary Plan of Care Collaboration   IDT Collaboration with  Nursing   Collaboration Comments Cognitive evaluation order received. Per chart review, Pt with known/advanced dementia and needs 24/7 supervision after discharge. Per RN, cognitive-linguistic evaluation is not indicated/warranted at this time per MD. Will cancel eval at this time. Thank you.

## 2021-02-26 NOTE — WOUND TEAM
REceived consult for sacral injury. Pt assessed. Sacrum pink and blanching. Barrier paste applied. Nursing to implement pressure injury prevention protocol and continue offloading precautions. Pt does not require advanced wound care. Wound team signing off; re-consult as needed for new wounds.

## 2021-02-26 NOTE — DIETARY
"Nutrition services: Day 0 of admit.  Olesya Harmon is a 80 y.o. female with admitting DX of Delirium. DX includes CKD stage 3, dyslipidemia, HTN, and advanced dementia.     Consult received for consult for failure to thrive and MST of 5 on nutrition screen related to report of >/= 34 lb and poor PO PTA      Assessment:  Height: 167.6 cm (5' 6\")  Weight: 85.3 kg (188 lb 0.8 oz)(bed scale)  Admit weight: 78.6 kg (173 lb 4.5 oz)(standing scale)  Body mass index (admit wt) is 27.97 kg/m2., BMI classification: overweight  Diet/intake: regular/25-50% at breakfast    Evaluation:   1. Pt is not able to provide hx due to advanced dementia. Oriented to name only.   2. Pt with two weights on 2/25/21. Initial weight was on standing scale and weight later on bed scale. Standing scale weight is most likely accurate. Weight hx reviewed in Epic:  1. 8/24/20=80.3 kg  2. 3/4/20=79 kg  3. 2/3/20=82 kg  Based on weights in Epic. Pt has lost 3.5 kg (4.3%) x 1 year. This is based on pt's admit weight of 78.6 kg. Weight loss is not significant.   3. RD spoke with pt's nurse about pt's PO at breakfast. Per nurse, pt ate all of her meal. Conflicting information noted with report of PO of all of her breakfast from nursing and documentation of 25-50% in Epic by CNA. RD will continue to monitor PO intake to ensure that it is > 50%. Pt may benefit from supplements due to her advanced dementia and risk for poor PO. MD agreed and added Boost Plus to meals TID.   4. Skin: redness on sacrum.   5. IV fluids: NS @ 75 mL/hour    Malnutrition Risk: criteria not met    Recommendations/Plan:  1. Add Boost Plus to meals TID   2. Encourage intake of >50%  3. Document intake of all meals and supplements as % taken in ADL's to provide interdisciplinary communication across all shifts.   4. Monitor weight.  5. Nutrition rep will continue to see patient for ongoing meal and snack preferences.     RD will follow.        "

## 2021-02-26 NOTE — THERAPY
Physical Therapy   Initial Evaluation     Patient Name: Olesya Harmon  Age:  80 y.o., Sex:  female  Medical Record #: [id number]  Today's Date: 2/26/2021          Assessment  Patient is 80 y.o. female with a diagnosis of Dementia Pt lives at home and her son has been staying with her.Pt appears to be at base line level of function but limited by mentation.No acute PT needs at this time    Plan    Recommend Physical Therapy for Evaluation only      02/26/21 1000   Prior Living Situation   Prior Services Intermittent Physical Support for ADL Per Family   Housing / Facility 1 Story House   Steps Into Home 0   Steps In Home 0   Equipment Owned 4-Wheel Walker   Lives with - Patient's Self Care Capacity Adult Children   Prior Level of Functional Mobility   Bed Mobility Independent   Transfer Status Independent   Ambulation Independent   Distance Ambulation (Feet)   (household)   Assistive Devices Used 4-Wheel Walker   Balance Assessment   Sitting Balance (Static) Good   Sitting Balance (Dynamic) Good   Standing Balance (Static) Fair +   Standing Balance (Dynamic) Fair +   Weight Shift Sitting Good   Weight Shift Standing Good   Gait Analysis   Gait Level Of Assist Supervised   Assistive Device 4 Wheel Walker   Distance (Feet) 200   # of Times Distance was Traveled 1   Deviation Step To   Weight Bearing Status full   Bed Mobility    Supine to Sit Modified Independent   Sit to Supine Modified Independent   Scooting Modified Independent   Functional Mobility   Sit to Stand Supervised   Bed, Chair, Wheelchair Transfer Supervised   Transfer Method Stand Step   Activity Tolerance   Sitting Edge of Bed 10   Standing 10   Patient / Family Goals    Patient / Family Goal #1 not stated   Anticipated Discharge Equipment and Recommendations   DC Equipment Recommendations None   Discharge Recommendations   (Pt probably needs placement)       DC Equipment Recommendations: (P) None  Discharge Recommendations: (P) (Pt probably needs  placement)

## 2021-02-26 NOTE — CARE PLAN
Problem: Communication  Goal: The ability to communicate needs accurately and effectively will improve  Outcome: PROGRESSING AS EXPECTED  Note: Pt is able to express and communicate needs appropriately. Occasional dysarthria noted but pt is able to eventually communicate effectively.       Problem: Safety  Goal: Will remain free from injury  Outcome: PROGRESSING AS EXPECTED  Note: Pt has remained free from falls and all safety measures are put in place.       Problem: Urinary Elimination:  Goal: Ability to reestablish a normal urinary elimination pattern will improve  Outcome: PROGRESSING SLOWER THAN EXPECTED  Note: Pt is not able to verbalize when needing to eliminate urine effectively. Purewick is in place.

## 2021-02-27 PROCEDURE — A9270 NON-COVERED ITEM OR SERVICE: HCPCS | Performed by: INTERNAL MEDICINE

## 2021-02-27 PROCEDURE — 96372 THER/PROPH/DIAG INJ SC/IM: CPT

## 2021-02-27 PROCEDURE — 700102 HCHG RX REV CODE 250 W/ 637 OVERRIDE(OP): Performed by: INTERNAL MEDICINE

## 2021-02-27 PROCEDURE — G0378 HOSPITAL OBSERVATION PER HR: HCPCS

## 2021-02-27 PROCEDURE — 99225 PR SUBSEQUENT OBSERVATION CARE,LEVEL II: CPT | Performed by: INTERNAL MEDICINE

## 2021-02-27 PROCEDURE — 700111 HCHG RX REV CODE 636 W/ 250 OVERRIDE (IP): Performed by: INTERNAL MEDICINE

## 2021-02-27 RX ORDER — QUETIAPINE FUMARATE 25 MG/1
50 TABLET, FILM COATED ORAL NIGHTLY
Status: DISCONTINUED | OUTPATIENT
Start: 2021-02-27 | End: 2021-02-28 | Stop reason: HOSPADM

## 2021-02-27 RX ORDER — HALOPERIDOL 5 MG/ML
5 INJECTION INTRAMUSCULAR EVERY 4 HOURS PRN
Status: DISCONTINUED | OUTPATIENT
Start: 2021-02-27 | End: 2021-02-28 | Stop reason: HOSPADM

## 2021-02-27 RX ADMIN — QUETIAPINE FUMARATE 50 MG: 25 TABLET ORAL at 21:18

## 2021-02-27 RX ADMIN — ENOXAPARIN SODIUM 40 MG: 40 INJECTION SUBCUTANEOUS at 06:25

## 2021-02-27 ASSESSMENT — FIBROSIS 4 INDEX: FIB4 SCORE: 2.88

## 2021-02-27 ASSESSMENT — PAIN DESCRIPTION - PAIN TYPE: TYPE: ACUTE PAIN

## 2021-02-27 NOTE — CARE PLAN
Problem: Safety  Goal: Will remain free from injury  Safety measures in place, bed in lowest position, side rails up x2, bed alarm on, call light in place, patient is free from injury at this time.   Outcome: PROGRESSING AS EXPECTED  Goal: Will remain free from falls  Safety measures in place, bed in lowest position, side rails up x2, bed alarm on, call light in place, patient is free from falls at this time.   Outcome: PROGRESSING AS EXPECTED     Problem: Infection  Hand hygiene observed at all times. Patient compliant with infection control practices, educated to wash hand and use hand  as needed.  Aseptic technique observed.  Patient is free of infection at this time.   Goal: Will remain free from infection  Outcome: PROGRESSING AS EXPECTED

## 2021-02-27 NOTE — PROGRESS NOTES
[facility] Medicine Daily Progress Note    Date of Service  2/27/2021    Chief Complaint  80 y.o. female admitted 2/25/2021 with worsening dementia    Hospital Course  No notes on file    Interval Problem Update  Patient was seen and examined at bedside.  No acute events overnight. Patient is resting comfortably in bed and in no acute distress. Behavioral dysregulation over night.     A&Ox1 (self)  MMSE severe cognitive impairment  Seroquel QHS  Haldol PRN    Consultants/Specialty  none    Code Status  DNAR/DNI    Disposition  TBD - memory care unit    Review of Systems  Review of Systems   Unable to perform ROS: Dementia        Physical Exam  Temp:  [36.7 °C (98 °F)-37 °C (98.6 °F)] 36.7 °C (98 °F)  Pulse:  [65-74] 65  Resp:  [14-18] 14  BP: (138-151)/(53-61) 151/61  SpO2:  [94 %-97 %] 96 %    Physical Exam  Constitutional:       General: She is not in acute distress.     Appearance: She is not ill-appearing or toxic-appearing.   HENT:      Head: Normocephalic and atraumatic.      Right Ear: External ear normal.      Left Ear: External ear normal.      Nose: No congestion.      Mouth/Throat:      Pharynx: No oropharyngeal exudate.   Eyes:      General: No scleral icterus.     Pupils: Pupils are equal, round, and reactive to light.   Cardiovascular:      Rate and Rhythm: Normal rate.      Heart sounds: No murmur.   Pulmonary:      Effort: No respiratory distress.      Breath sounds: No wheezing.   Abdominal:      General: Abdomen is flat.      Palpations: Abdomen is soft.      Tenderness: There is no abdominal tenderness. There is no guarding or rebound.   Musculoskeletal:         General: No swelling or deformity.   Skin:     Coloration: Skin is not jaundiced.      Findings: No bruising.   Neurological:      General: No focal deficit present.      Mental Status: She is alert. She is disoriented.      Comments: Moves all extremities spontaneously     Psychiatric:         Cognition and Memory: Memory is impaired. She  exhibits impaired recent memory.      Comments: A&O x1 (self)       Patient was seen and examined at bedside. No changes in physical exam from prior evaluation.    Fluids    Intake/Output Summary (Last 24 hours) at 2/27/2021 1148  Last data filed at 2/27/2021 0900  Gross per 24 hour   Intake 260 ml   Output --   Net 260 ml       Laboratory  Recent Labs     02/25/21  1032 02/26/21  0431   WBC 4.8 4.2*   RBC 4.94 4.44   HEMOGLOBIN 14.6 12.9   HEMATOCRIT 44.0 39.5   MCV 89.1 89.0   MCH 29.6 29.1   MCHC 33.2* 32.7*   RDW 41.8 42.0   PLATELETCT 293 238   MPV 8.8* 9.2     Recent Labs     02/25/21  1032 02/26/21  0431   SODIUM 141 143   POTASSIUM 3.8 3.7   CHLORIDE 104 108   CO2 24 25   GLUCOSE 105* 100*   BUN 16 17   CREATININE 0.96 0.90   CALCIUM 9.5 8.6                   Imaging  No orders to display        Assessment/Plan  * Dementia (HCC)  Assessment & Plan  The patient knows her first name only, she is not oriented to date time or person.  The patient lives with her son and niece and her needs can no longer be met in home environment  Moderate to advanced dementia FAST6c  TSH B12 and syphilis WNL  MMSE severe cognitive impairment  Seroquel QHS  Haldol PRN      CKD (chronic kidney disease) stage 3, GFR 30-59 ml/min- (present on admission)  Assessment & Plan  Around baseline and GFR around 50s  Avoid nephrotoxic medication    Essential hypertension- (present on admission)  Assessment & Plan  Systolic around 160s  Due to poor prognosis and advanced dementia, will be less aggressive with the treatment         VTE prophylaxis: Lovenox

## 2021-02-27 NOTE — PROGRESS NOTES
Report received from MAXIMINO Loyd and care of patient assumed. Pt resting comfortably in bed without any signs of distress. Alert and oriented to self only. VSS, no complaints of pain at this time. POC reviewed and white board updated, Tele box on, Call light within reach, Bed locked in lowest position and side rails up x2. Bed alarm on and audible. Will monitor.

## 2021-02-27 NOTE — PROGRESS NOTES
Rounding on patient. Patient's daughter at bedside and updated on the POC. All questions answered. Patient's daughter stated she and her family will need help with placement upon discharge. Call light within reach and side rails up x2. No needs at this time. Comfortable in bed. Will continue to monitor patient.

## 2021-02-27 NOTE — PROGRESS NOTES
Rounding on patient. Alert to self. Denied complaints of pain. Call light within reach and side rails up x2. Bed alarm on and audible. No needs at this time. Comfortable in bed. Will continue to monitor patient.

## 2021-02-27 NOTE — PROGRESS NOTES
AOx0, agitated, irritable,  VSS, Afebrile. Denies pain. Pt is  Not cooperative. Assessment per doc flowsheet. Due medications administered, no aspiration observed PIV intact & patent, saline locked. States understanding of POC.  No complaints at this time.  Pt educated to call for assist, however patient not compliant. Non-skid socks. Bed locked & in low position. Bed alarm on.

## 2021-02-28 VITALS
HEART RATE: 65 BPM | RESPIRATION RATE: 14 BRPM | SYSTOLIC BLOOD PRESSURE: 152 MMHG | TEMPERATURE: 98.6 F | DIASTOLIC BLOOD PRESSURE: 71 MMHG | WEIGHT: 191.8 LBS | OXYGEN SATURATION: 97 % | HEIGHT: 66 IN | BODY MASS INDEX: 30.82 KG/M2

## 2021-02-28 PROCEDURE — G0378 HOSPITAL OBSERVATION PER HR: HCPCS

## 2021-02-28 PROCEDURE — A9270 NON-COVERED ITEM OR SERVICE: HCPCS | Performed by: INTERNAL MEDICINE

## 2021-02-28 PROCEDURE — 700111 HCHG RX REV CODE 636 W/ 250 OVERRIDE (IP): Performed by: INTERNAL MEDICINE

## 2021-02-28 PROCEDURE — 99217 PR OBSERVATION CARE DISCHARGE: CPT | Performed by: INTERNAL MEDICINE

## 2021-02-28 PROCEDURE — 700102 HCHG RX REV CODE 250 W/ 637 OVERRIDE(OP): Performed by: INTERNAL MEDICINE

## 2021-02-28 PROCEDURE — 96372 THER/PROPH/DIAG INJ SC/IM: CPT

## 2021-02-28 RX ORDER — QUETIAPINE FUMARATE 50 MG/1
50 TABLET, FILM COATED ORAL
Qty: 30 TABLET | Refills: 0 | Status: SHIPPED | OUTPATIENT
Start: 2021-02-28 | End: 2021-03-30

## 2021-02-28 RX ADMIN — SENNOSIDES AND DOCUSATE SODIUM 2 TABLET: 8.6; 5 TABLET ORAL at 06:00

## 2021-02-28 RX ADMIN — ENOXAPARIN SODIUM 40 MG: 40 INJECTION SUBCUTANEOUS at 06:00

## 2021-02-28 NOTE — DISCHARGE PLANNING
Anticipated Discharge Disposition: home with family     Action: LSW spoke with dtr Marifer at bedside. LSW provided list of [facility]Memory Care units to dtr. LSW discussed that both are not covered by Medicare and at this time pt is not recommended to SNF. LSW also discussed that SNF would not work as pt does not have Medicaid to pay for room and board. LSW discussed the difference between [facility]memory care and SNF. LSW discussed group home option as well as they are cheaper but pt would need to be evaluated first to make sure she is not a flight risk. LSW also discussed process of getting pt evaluated for memory care/[facility] and that cost would need to be out of pocket. Per Marifer, either her or her brother Servando have access to pts finances and that pt has a will they need to review to see if there is any POA for medical or finances. Marifer stated that she was going to call her brother Servando to discuss taking pt home at this time and f/u with facilities if needed. LSW asked Marifer if she felt comfortable managing pt and that pt was not a flight risk at home. Marifer stated yes and pt wasn't a flight risk and will discuss further with Servando.     Barriers to Discharge: none    Plan: Pt to d/c with family. LSW updated attending MD

## 2021-02-28 NOTE — PROGRESS NOTES
Assumed report and patient care from Kristy STANTON. Patient is resting comfortably in bed with no signs of labored breathing or restlessness. POC discussed. No questions or concerns at this time. Safety measures in place, call light within reach. Daughter at bedside.

## 2021-02-28 NOTE — DISCHARGE INSTRUCTIONS
Discharge Instructions    Discharged to home by car with relative. Discharged via wheelchair, hospital escort: Yes.  Special equipment needed: Not Applicable    Be sure to schedule a follow-up appointment with your primary care doctor or any specialists as instructed.     Discharge Plan:   Diet Plan: Discussed  Activity Level: Discussed  Confirmed Follow up Appointment: Patient to Call and Schedule Appointment  Confirmed Symptoms Management: Discussed  Medication Reconciliation Updated: Yes  Influenza Vaccine Indication: Not indicated: Previously immunized this influenza season and > 8 years of age    I understand that a diet low in cholesterol, fat, and sodium is recommended for good health. Unless I have been given specific instructions below for another diet, I accept this instruction as my diet prescription.   Other diet: Regular    Special Instructions: None    · Is patient discharged on Warfarin / Coumadin?   No     Depression / Suicide Risk    As you are discharged from this [facility] facility, it is important to learn how to keep safe from harming yourself.    Recognize the warning signs:  · Abrupt changes in personality, positive or negative- including increase in energy   · Giving away possessions  · Change in eating patterns- significant weight changes-  positive or negative  · Change in sleeping patterns- unable to sleep or sleeping all the time   · Unwillingness or inability to communicate  · Depression  · Unusual sadness, discouragement and loneliness  · Talk of wanting to die  · Neglect of personal appearance   · Rebelliousness- reckless behavior  · Withdrawal from people/activities they love  · Confusion- inability to concentrate     If you or a loved one observes any of these behaviors or has concerns about self-harm, here's what you can do:  · Talk about it- your feelings and reasons for harming yourself  · Remove any means that you might use to hurt yourself (examples: pills, rope, extension  cords, firearm)  · Get professional help from the community (Mental Health, Substance Abuse, psychological counseling)  · Do not be alone:Call your Safe Contact- someone whom you trust who will be there for you.  · Call your local CRISIS HOTLINE 208-6864 or 058-589-2425  · Call your local Children's Mobile Crisis Response Team [place] (359) 692-3396 or www.[employer]  · Call the toll free National Suicide Prevention Hotlines   · National Suicide Prevention Lifeline 401-275-RVXM (0168)  · National Hope Line Network 800-SUICIDE (216-8433)

## 2021-02-28 NOTE — PROGRESS NOTES
[facility] Medicine Daily Progress Note    Date of Service  2/28/2021    Chief Complaint  80 y.o. female admitted 2/25/2021 with worsening dementia    Hospital Course  No notes on file    Interval Problem Update  Patient was seen and examined at bedside.  No acute events overnight. Patient is resting comfortably in bed and in no acute distress. Spoke with daughter, Marifer, at bedside this AM. Social work discussed care options with family.      A&Ox1 (self)  MMSE severe cognitive impairment  Seroquel QHS  Haldol PRN    Consultants/Specialty  none    Code Status  DNAR/DNI    Disposition  Dishcarge home with son in AM    Review of Systems  Review of Systems   Unable to perform ROS: Dementia        Physical Exam  Temp:  [36.7 °C (98 °F)-37 °C (98.6 °F)] 37 °C (98.6 °F)  Pulse:  [65-74] 65  Resp:  [14-18] 14  BP: (140-152)/(48-71) 152/71  SpO2:  [96 %-97 %] 97 %    Physical Exam  Constitutional:       General: She is not in acute distress.     Appearance: She is not ill-appearing or toxic-appearing.   HENT:      Head: Normocephalic and atraumatic.      Right Ear: External ear normal.      Left Ear: External ear normal.      Nose: No congestion.      Mouth/Throat:      Pharynx: No oropharyngeal exudate.   Eyes:      General: No scleral icterus.     Pupils: Pupils are equal, round, and reactive to light.   Cardiovascular:      Rate and Rhythm: Normal rate.      Heart sounds: No murmur.   Pulmonary:      Effort: No respiratory distress.      Breath sounds: No wheezing.   Abdominal:      General: Abdomen is flat.      Palpations: Abdomen is soft.      Tenderness: There is no abdominal tenderness. There is no guarding or rebound.   Musculoskeletal:         General: No swelling or deformity.   Skin:     Coloration: Skin is not jaundiced.      Findings: No bruising.   Neurological:      General: No focal deficit present.      Mental Status: She is alert. She is disoriented.      Comments: Moves all extremities spontaneously      Psychiatric:         Cognition and Memory: Memory is impaired. She exhibits impaired recent memory.      Comments: A&O x1 (self)       Patient was seen and examined at bedside. No changes in physical exam from prior evaluation.      Fluids  No intake or output data in the 24 hours ending 02/28/21 1328    Laboratory  Recent Labs     02/26/21  0431   WBC 4.2*   RBC 4.44   HEMOGLOBIN 12.9   HEMATOCRIT 39.5   MCV 89.0   MCH 29.1   MCHC 32.7*   RDW 42.0   PLATELETCT 238   MPV 9.2     Recent Labs     02/26/21  0431   SODIUM 143   POTASSIUM 3.7   CHLORIDE 108   CO2 25   GLUCOSE 100*   BUN 17   CREATININE 0.90   CALCIUM 8.6                   Imaging  No orders to display        Assessment/Plan  * Dementia (HCC)  Assessment & Plan  The patient knows her first name only, she is not oriented to date time or person.  The patient lives with her son and niece and her needs can no longer be met in home environment  Moderate to advanced dementia FAST6c  TSH B12 and syphilis WNL  MMSE severe cognitive impairment  Seroquel QHS  Haldol PRN      CKD (chronic kidney disease) stage 3, GFR 30-59 ml/min- (present on admission)  Assessment & Plan  Around baseline and GFR around 50s  Avoid nephrotoxic medication    Essential hypertension- (present on admission)  Assessment & Plan  Systolic around 160s  Due to poor prognosis and advanced dementia, will be less aggressive with the treatment         VTE prophylaxis: Lovenox

## 2021-02-28 NOTE — PROGRESS NOTES
Pt taken down to room 107 with Henry STANTON. Bedside belongings taken down with patient. Safety measures in place during transport. Release care.

## 2021-02-28 NOTE — PROGRESS NOTES
Spoke to son Servando about mother's discharge. Says that they are not prepared for discharge today, but they will pick her up at approximately 1300 on 3/1/2021. MD costello.

## 2021-03-01 ENCOUNTER — TELEPHONE (OUTPATIENT)
Dept: HEALTH INFORMATION MANAGEMENT | Facility: OTHER | Age: 81
End: 2021-03-01

## 2021-03-01 RX ORDER — SULFAMETHOXAZOLE AND TRIMETHOPRIM 800; 160 MG/1; MG/1
1 TABLET ORAL 2 TIMES DAILY
Qty: 14 TABLET | Refills: 0 | Status: SHIPPED | OUTPATIENT
Start: 2021-03-01 | End: 2021-03-08

## 2021-03-01 NOTE — DISCHARGE SUMMARY
Discharge Summary    CHIEF COMPLAINT ON ADMISSION  Chief Complaint   Patient presents with   • Failure to Thrive       Reason for Admission  Other      Admission Date  2/25/2021    CODE STATUS  Prior    HPI & HOSPITAL COURSE  This is a 80 y.o. female here with dementia with behavioral disturbances.  Medical history significant for CKD, DLD, dementia. Patient is at baseline orientation to self only. No leukocytosis, afebrile, negative procal on admission. Vitals within normal limits. Urinalysis was concerning for UTI at admission and received Unasyn in ED. Patient did become dysregulate her first night and was started on seroquel QHS with appropriate response. The patient's daughter met with social work to discuss long term options and ultimately decided to have patient discharge back home where the patients daughter and son would collectively proved care. Discharged with antibiotics for presumed UTI although culture not performed. Instructed to follow up for repeat labs with PCP in 3-5 days to monitor kidney function which was at baseline Cr 0.9 at discharge. Patient was determined satisfactory for discharge with appropriate follow up per below.    Therefore, she is discharged in fair and stable condition to home with close outpatient follow-up.    The patient met 2-midnight criteria for an inpatient stay at the time of discharge.    Discharge Date  2/28/2021    FOLLOW UP ITEMS POST DISCHARGE  Please follow up with PCP in 3-5 days for post hospitalization follow up and medication reconciliation.       DISCHARGE DIAGNOSES  Principal Problem:    Dementia (HCC) POA: Unknown  Active Problems:    Essential hypertension POA: Yes    CKD (chronic kidney disease) stage 3, GFR 30-59 ml/min POA: Yes  Resolved Problems:    * No resolved hospital problems. *      FOLLOW UP  No future appointments.  No follow-up provider specified.    MEDICATIONS ON DISCHARGE     Medication List      START taking these medications       Instructions   QUEtiapine 50 MG tablet  Commonly known as: Seroquel   Take 1 tablet by mouth every bedtime for 30 days.  Dose: 50 mg     sulfamethoxazole-trimethoprim 800-160 MG tablet  Commonly known as: BACTRIM DS   Take 1 tablet by mouth 2 times a day for 7 days.  Dose: 1 tablet        CONTINUE taking these medications      Instructions   docusate sodium 100 MG Caps   Take 100 mg by mouth 2 Times a Day.  Dose: 100 mg        STOP taking these medications    aspirin 81 MG EC tablet     cetirizine 10 MG Tabs  Commonly known as: ZYRTEC     cholecalciferol 400 UNIT Tabs  Commonly known as: VITAMIN D3     Garlic 1000 MG Caps     triamcinolone acetonide 0.1 % Crea  Commonly known as: KENALOG            Allergies  Allergies   Allergen Reactions   • Codeine Vomiting       DIET  No orders of the defined types were placed in this encounter.      ACTIVITY  As tolerated.  Weight bearing as tolerated    CONSULTATIONS  none    PROCEDURES  N/A    LABORATORY  Lab Results   Component Value Date    SODIUM 143 02/26/2021    POTASSIUM 3.7 02/26/2021    CHLORIDE 108 02/26/2021    CO2 25 02/26/2021    GLUCOSE 100 (H) 02/26/2021    BUN 17 02/26/2021    CREATININE 0.90 02/26/2021    CREATININE 1.3 12/30/2008        Lab Results   Component Value Date    WBC 4.2 (L) 02/26/2021    HEMOGLOBIN 12.9 02/26/2021    HEMATOCRIT 39.5 02/26/2021    PLATELETCT 238 02/26/2021        Total time of the discharge process exceeds 37 minutes.

## 2021-03-01 NOTE — TELEPHONE ENCOUNTER
HTH/SCP LSW placed outreach call to mbr to follow up regarding hospital discharge and discuss setting up follow up visit. Per chart review pt does not have PCP. There was no answer and no option to leave message. LSW will attempt to reach pt at later time/date.

## 2021-03-02 ENCOUNTER — PATIENT OUTREACH (OUTPATIENT)
Dept: HEALTH INFORMATION MANAGEMENT | Facility: OTHER | Age: 81
End: 2021-03-02

## 2021-03-02 ENCOUNTER — TELEPHONE (OUTPATIENT)
Dept: HEALTH INFORMATION MANAGEMENT | Facility: OTHER | Age: 81
End: 2021-03-02

## 2021-03-03 NOTE — TELEPHONE ENCOUNTER
HTH/SCP LSW placed 2nd outreach call to mbr to follow up regarding hospital discharge and discuss setting up follow up visit. Pt's son, Servando answered and gave pt phone. Pt is hard of hearing and gave phone back to Servando requesting LSW speak with him. Per Servando pt's currently does not have a PCP as her previous PCP changed locations. Member needs assistance with scheduling an appt to establish with a new PCP. Also discussed [facility] benefits with son. Son would like referral to [facility] for hospital follow up care in the interim.  Contact information provided.

## 2021-03-04 NOTE — PROGRESS NOTES
CHW Bello will discharge pt. From CCM services due to lack of contact after x3 TC attempts 03/04/21.

## 2021-03-09 ENCOUNTER — HOSPITAL ENCOUNTER (OUTPATIENT)
Facility: MEDICAL CENTER | Age: 81
End: 2021-03-09
Attending: INTERNAL MEDICINE
Payer: MEDICARE

## 2021-03-09 LAB
25(OH)D3 SERPL-MCNC: 40 NG/ML (ref 30–100)
ALBUMIN SERPL BCP-MCNC: 4.4 G/DL (ref 3.2–4.9)
ALBUMIN/GLOB SERPL: 2 G/DL
ALP SERPL-CCNC: 109 U/L (ref 30–99)
ALT SERPL-CCNC: 7 U/L (ref 2–50)
ANION GAP SERPL CALC-SCNC: 15 MMOL/L (ref 7–16)
AST SERPL-CCNC: 16 U/L (ref 12–45)
BASOPHILS # BLD AUTO: 1.3 % (ref 0–1.8)
BASOPHILS # BLD: 0.09 K/UL (ref 0–0.12)
BILIRUB SERPL-MCNC: 0.3 MG/DL (ref 0.1–1.5)
BUN SERPL-MCNC: 24 MG/DL (ref 8–22)
CALCIUM SERPL-MCNC: 9.8 MG/DL (ref 8.5–10.5)
CHLORIDE SERPL-SCNC: 97 MMOL/L (ref 96–112)
CO2 SERPL-SCNC: 23 MMOL/L (ref 20–33)
CREAT SERPL-MCNC: 1.45 MG/DL (ref 0.5–1.4)
EOSINOPHIL # BLD AUTO: 0.15 K/UL (ref 0–0.51)
EOSINOPHIL NFR BLD: 2.1 % (ref 0–6.9)
ERYTHROCYTE [DISTWIDTH] IN BLOOD BY AUTOMATED COUNT: 46.2 FL (ref 35.9–50)
GLOBULIN SER CALC-MCNC: 2.2 G/DL (ref 1.9–3.5)
GLUCOSE SERPL-MCNC: 96 MG/DL (ref 65–99)
HCT VFR BLD AUTO: 46.3 % (ref 37–47)
HGB BLD-MCNC: 14.7 G/DL (ref 12–16)
IMM GRANULOCYTES # BLD AUTO: 0.01 K/UL (ref 0–0.11)
IMM GRANULOCYTES NFR BLD AUTO: 0.1 % (ref 0–0.9)
LYMPHOCYTES # BLD AUTO: 2.05 K/UL (ref 1–4.8)
LYMPHOCYTES NFR BLD: 29.3 % (ref 22–41)
MCH RBC QN AUTO: 29.6 PG (ref 27–33)
MCHC RBC AUTO-ENTMCNC: 31.7 G/DL (ref 33.6–35)
MCV RBC AUTO: 93.3 FL (ref 81.4–97.8)
MONOCYTES # BLD AUTO: 0.67 K/UL (ref 0–0.85)
MONOCYTES NFR BLD AUTO: 9.6 % (ref 0–13.4)
NEUTROPHILS # BLD AUTO: 4.03 K/UL (ref 2–7.15)
NEUTROPHILS NFR BLD: 57.6 % (ref 44–72)
NRBC # BLD AUTO: 0 K/UL
NRBC BLD-RTO: 0 /100 WBC
PLATELET # BLD AUTO: 322 K/UL (ref 164–446)
PMV BLD AUTO: 9.6 FL (ref 9–12.9)
POTASSIUM SERPL-SCNC: 4.5 MMOL/L (ref 3.6–5.5)
PROT SERPL-MCNC: 6.6 G/DL (ref 6–8.2)
RBC # BLD AUTO: 4.96 M/UL (ref 4.2–5.4)
SODIUM SERPL-SCNC: 135 MMOL/L (ref 135–145)
TSH SERPL DL<=0.005 MIU/L-ACNC: 2.21 UIU/ML (ref 0.38–5.33)
VIT B12 SERPL-MCNC: 298 PG/ML (ref 211–911)
WBC # BLD AUTO: 7 K/UL (ref 4.8–10.8)

## 2021-03-09 PROCEDURE — 82306 VITAMIN D 25 HYDROXY: CPT

## 2021-03-09 PROCEDURE — 85025 COMPLETE CBC W/AUTO DIFF WBC: CPT

## 2021-03-09 PROCEDURE — 84443 ASSAY THYROID STIM HORMONE: CPT

## 2021-03-09 PROCEDURE — 87086 URINE CULTURE/COLONY COUNT: CPT

## 2021-03-09 PROCEDURE — 82607 VITAMIN B-12: CPT

## 2021-03-09 PROCEDURE — 80053 COMPREHEN METABOLIC PANEL: CPT

## 2021-03-11 LAB
BACTERIA UR CULT: NORMAL
SIGNIFICANT IND 70042: NORMAL
SITE SITE: NORMAL
SOURCE SOURCE: NORMAL

## 2021-04-07 ENCOUNTER — TELEPHONE (OUTPATIENT)
Dept: HEALTH INFORMATION MANAGEMENT | Facility: OTHER | Age: 81
End: 2021-04-07

## 2021-04-07 NOTE — TELEPHONE ENCOUNTER
Situation:   · HTH/SCP LSW received referral from [facility] indicating family is interested in continued care giving type services.     Background:   · Lindy is 80 year old  female. Her son, Servando is currently living with her and assisting with care needs. Lindy is currently on transitional [facility] with [facility]. Jennings.  Son living with patient can get up and down independently niece is helping with showers stand by. [place]    Assessment:   · Outreach call to mbr/family to introduce self, discuss role of SW and mbr/family's identified needs. Lindy's son Servando answered and placed mbr on phone who gave permission for LSW to discuss social needs/services with son. Servando came back on the line.   · Servando reported interest in additional help in the home for care giving services. Discussed mbr's ADL/iADL need.   · Per Servando mbsteven needs some assistance in the shower-he reported his niece (mbr's granddaughter) currently is assisting with this and reported she has some incontinence issues. He reported she uses a 4-wheeled walker and is able to ambulate with walker independently. He reported she can dress herself slowly and complete grooming tasks. He reported she is able to wash a few dishes but other iADLs are managed by family.   · Reviewed current financials. Per Servando  · Lindy receives SSRI and a small pension. He reported her SSRI is around $1230/month and the pension is about $272/year. He indicated she owns her mobile home-currently paying a mortgage and a 2002 Toyota Melanie. He reported she has about $2000 in her savings and $6000 in her checking. Denied any burial/creamation plans or life insurance or other assets.   · LSW discussed potential programs mbr may qualify for. At this time she is over asset limits for the HCBW program with [employer] however she may qualify for jacquelyn-funded program COPE with [employer]. Discussed program with son who would like to pursue and have referral placed. Referral form  completed with son and VM left with [employer] to place referral.   · FARZANAW also discussed [employer] [facility] program for caregivers. He voiced interest in program and would like to review. FILIPPO emailed application securely to mbr at [email or web address]. Application also sent via email securely  to Dr. Rivera's office requesting provider to complete Physician Statement on application.   Recommendation/Requests:   · LSW will continue to follow mbr to assist with social needs.

## 2021-04-12 ENCOUNTER — HOSPITAL ENCOUNTER (EMERGENCY)
Facility: MEDICAL CENTER | Age: 81
End: 2021-04-12
Attending: EMERGENCY MEDICINE
Payer: MEDICARE

## 2021-04-12 ENCOUNTER — APPOINTMENT (OUTPATIENT)
Dept: RADIOLOGY | Facility: MEDICAL CENTER | Age: 81
End: 2021-04-12
Attending: EMERGENCY MEDICINE
Payer: MEDICARE

## 2021-04-12 VITALS
HEART RATE: 73 BPM | RESPIRATION RATE: 16 BRPM | DIASTOLIC BLOOD PRESSURE: 68 MMHG | TEMPERATURE: 97.5 F | HEIGHT: 67 IN | WEIGHT: 191 LBS | BODY MASS INDEX: 29.98 KG/M2 | SYSTOLIC BLOOD PRESSURE: 156 MMHG | OXYGEN SATURATION: 96 %

## 2021-04-12 DIAGNOSIS — F03.91 DEMENTIA WITH BEHAVIORAL DISTURBANCE, UNSPECIFIED DEMENTIA TYPE: ICD-10-CM

## 2021-04-12 DIAGNOSIS — N39.0 URINARY TRACT INFECTION WITHOUT HEMATURIA, SITE UNSPECIFIED: ICD-10-CM

## 2021-04-12 LAB
ALBUMIN SERPL BCP-MCNC: 3.9 G/DL (ref 3.2–4.9)
ALBUMIN/GLOB SERPL: 1.5 G/DL
ALP SERPL-CCNC: 82 U/L (ref 30–99)
ALT SERPL-CCNC: <5 U/L (ref 2–50)
AMPHET UR QL SCN: NEGATIVE
ANION GAP SERPL CALC-SCNC: 11 MMOL/L (ref 7–16)
APPEARANCE UR: ABNORMAL
AST SERPL-CCNC: 15 U/L (ref 12–45)
BACTERIA #/AREA URNS HPF: NEGATIVE /HPF
BARBITURATES UR QL SCN: NEGATIVE
BASOPHILS # BLD AUTO: 1 % (ref 0–1.8)
BASOPHILS # BLD: 0.04 K/UL (ref 0–0.12)
BENZODIAZ UR QL SCN: NEGATIVE
BILIRUB SERPL-MCNC: 0.7 MG/DL (ref 0.1–1.5)
BILIRUB UR QL STRIP.AUTO: NEGATIVE
BUN SERPL-MCNC: 24 MG/DL (ref 8–22)
BZE UR QL SCN: NEGATIVE
CALCIUM SERPL-MCNC: 9.1 MG/DL (ref 8.4–10.2)
CANNABINOIDS UR QL SCN: NEGATIVE
CHLORIDE SERPL-SCNC: 106 MMOL/L (ref 96–112)
CO2 SERPL-SCNC: 25 MMOL/L (ref 20–33)
COLOR UR: YELLOW
CREAT SERPL-MCNC: 0.99 MG/DL (ref 0.5–1.4)
EOSINOPHIL # BLD AUTO: 0.09 K/UL (ref 0–0.51)
EOSINOPHIL NFR BLD: 2.2 % (ref 0–6.9)
EPI CELLS #/AREA URNS HPF: NORMAL /HPF
ERYTHROCYTE [DISTWIDTH] IN BLOOD BY AUTOMATED COUNT: 43 FL (ref 35.9–50)
ETHANOL BLD-MCNC: <10.1 MG/DL (ref 0–10)
FLUAV RNA SPEC QL NAA+PROBE: NEGATIVE
FLUBV RNA SPEC QL NAA+PROBE: NEGATIVE
GLOBULIN SER CALC-MCNC: 2.6 G/DL (ref 1.9–3.5)
GLUCOSE SERPL-MCNC: 110 MG/DL (ref 65–99)
GLUCOSE UR STRIP.AUTO-MCNC: NEGATIVE MG/DL
HCT VFR BLD AUTO: 39.2 % (ref 37–47)
HGB BLD-MCNC: 13.1 G/DL (ref 12–16)
HYALINE CASTS #/AREA URNS LPF: NORMAL /LPF
IMM GRANULOCYTES # BLD AUTO: 0.01 K/UL (ref 0–0.11)
IMM GRANULOCYTES NFR BLD AUTO: 0.2 % (ref 0–0.9)
KETONES UR STRIP.AUTO-MCNC: NEGATIVE MG/DL
LACTATE BLD-SCNC: 1.4 MMOL/L (ref 0.5–2)
LEUKOCYTE ESTERASE UR QL STRIP.AUTO: ABNORMAL
LYMPHOCYTES # BLD AUTO: 1.16 K/UL (ref 1–4.8)
LYMPHOCYTES NFR BLD: 27.8 % (ref 22–41)
MCH RBC QN AUTO: 30 PG (ref 27–33)
MCHC RBC AUTO-ENTMCNC: 33.4 G/DL (ref 33.6–35)
MCV RBC AUTO: 89.9 FL (ref 81.4–97.8)
METHADONE UR QL SCN: NEGATIVE
MICRO URNS: ABNORMAL
MONOCYTES # BLD AUTO: 0.44 K/UL (ref 0–0.85)
MONOCYTES NFR BLD AUTO: 10.5 % (ref 0–13.4)
NEUTROPHILS # BLD AUTO: 2.44 K/UL (ref 2–7.15)
NEUTROPHILS NFR BLD: 58.3 % (ref 44–72)
NITRITE UR QL STRIP.AUTO: NEGATIVE
NRBC # BLD AUTO: 0 K/UL
NRBC BLD-RTO: 0 /100 WBC
OPIATES UR QL SCN: NEGATIVE
OXYCODONE UR QL SCN: NEGATIVE
PCP UR QL SCN: NEGATIVE
PH UR STRIP.AUTO: 6.5 [PH] (ref 5–8)
PLATELET # BLD AUTO: 240 K/UL (ref 164–446)
PMV BLD AUTO: 8.8 FL (ref 9–12.9)
POTASSIUM SERPL-SCNC: 3.9 MMOL/L (ref 3.6–5.5)
PROPOXYPH UR QL SCN: NEGATIVE
PROT SERPL-MCNC: 6.5 G/DL (ref 6–8.2)
PROT UR QL STRIP: NEGATIVE MG/DL
RBC # BLD AUTO: 4.36 M/UL (ref 4.2–5.4)
RBC # URNS HPF: NORMAL /HPF
RBC UR QL AUTO: ABNORMAL
RSV RNA SPEC QL NAA+PROBE: NEGATIVE
SARS-COV-2 RNA RESP QL NAA+PROBE: NOTDETECTED
SODIUM SERPL-SCNC: 142 MMOL/L (ref 135–145)
SP GR UR STRIP.AUTO: 1.01
SPECIMEN SOURCE: NORMAL
TSH SERPL DL<=0.005 MIU/L-ACNC: 2.88 UIU/ML (ref 0.38–5.33)
WBC # BLD AUTO: 4.2 K/UL (ref 4.8–10.8)
WBC #/AREA URNS HPF: NORMAL /HPF

## 2021-04-12 PROCEDURE — 80053 COMPREHEN METABOLIC PANEL: CPT

## 2021-04-12 PROCEDURE — 700111 HCHG RX REV CODE 636 W/ 250 OVERRIDE (IP)

## 2021-04-12 PROCEDURE — 96374 THER/PROPH/DIAG INJ IV PUSH: CPT

## 2021-04-12 PROCEDURE — 96375 TX/PRO/DX INJ NEW DRUG ADDON: CPT

## 2021-04-12 PROCEDURE — 84443 ASSAY THYROID STIM HORMONE: CPT

## 2021-04-12 PROCEDURE — 0241U HCHG SARS-COV-2 COVID-19 NFCT DS RESP RNA 4 TRGT MIC: CPT

## 2021-04-12 PROCEDURE — 36415 COLL VENOUS BLD VENIPUNCTURE: CPT

## 2021-04-12 PROCEDURE — 700105 HCHG RX REV CODE 258: Performed by: EMERGENCY MEDICINE

## 2021-04-12 PROCEDURE — 99285 EMERGENCY DEPT VISIT HI MDM: CPT

## 2021-04-12 PROCEDURE — 82077 ASSAY SPEC XCP UR&BREATH IA: CPT

## 2021-04-12 PROCEDURE — 700111 HCHG RX REV CODE 636 W/ 250 OVERRIDE (IP): Performed by: EMERGENCY MEDICINE

## 2021-04-12 PROCEDURE — 85025 COMPLETE CBC W/AUTO DIFF WBC: CPT

## 2021-04-12 PROCEDURE — 700102 HCHG RX REV CODE 250 W/ 637 OVERRIDE(OP): Performed by: EMERGENCY MEDICINE

## 2021-04-12 PROCEDURE — 80307 DRUG TEST PRSMV CHEM ANLYZR: CPT

## 2021-04-12 PROCEDURE — 81001 URINALYSIS AUTO W/SCOPE: CPT | Mod: XU

## 2021-04-12 PROCEDURE — C9803 HOPD COVID-19 SPEC COLLECT: HCPCS | Performed by: EMERGENCY MEDICINE

## 2021-04-12 PROCEDURE — 70450 CT HEAD/BRAIN W/O DYE: CPT | Mod: MG

## 2021-04-12 PROCEDURE — 83605 ASSAY OF LACTIC ACID: CPT

## 2021-04-12 PROCEDURE — A9270 NON-COVERED ITEM OR SERVICE: HCPCS | Performed by: EMERGENCY MEDICINE

## 2021-04-12 RX ORDER — LORAZEPAM 2 MG/ML
1 INJECTION INTRAMUSCULAR ONCE
Status: COMPLETED | OUTPATIENT
Start: 2021-04-12 | End: 2021-04-12

## 2021-04-12 RX ORDER — NITROFURANTOIN 25; 75 MG/1; MG/1
100 CAPSULE ORAL 2 TIMES DAILY
Qty: 14 CAPSULE | Refills: 0 | Status: SHIPPED | OUTPATIENT
Start: 2021-04-12 | End: 2021-04-19

## 2021-04-12 RX ORDER — LORAZEPAM 2 MG/ML
INJECTION INTRAMUSCULAR
Status: COMPLETED
Start: 2021-04-12 | End: 2021-04-12

## 2021-04-12 RX ORDER — DONEPEZIL HYDROCHLORIDE 10 MG/1
10 TABLET, FILM COATED ORAL DAILY
Status: SHIPPED | COMMUNITY
Start: 2021-03-12 | End: 2021-06-21

## 2021-04-12 RX ORDER — NITROFURANTOIN 25; 75 MG/1; MG/1
100 CAPSULE ORAL 2 TIMES DAILY WITH MEALS
Status: DISCONTINUED | OUTPATIENT
Start: 2021-04-12 | End: 2021-04-13 | Stop reason: HOSPADM

## 2021-04-12 RX ORDER — DIVALPROEX SODIUM 125 MG/1
125 TABLET, DELAYED RELEASE ORAL 3 TIMES DAILY
Status: SHIPPED | COMMUNITY
Start: 2021-03-12 | End: 2021-06-21

## 2021-04-12 RX ADMIN — LORAZEPAM 1 MG: 2 INJECTION INTRAMUSCULAR; INTRAVENOUS at 13:52

## 2021-04-12 RX ADMIN — SODIUM CHLORIDE 250 MG: 9 INJECTION, SOLUTION INTRAVENOUS at 14:30

## 2021-04-12 RX ADMIN — NITROFURANTOIN MONOHYDRATE/MACROCRYSTALLINE 100 MG: 25; 75 CAPSULE ORAL at 10:04

## 2021-04-12 RX ADMIN — LORAZEPAM 1 MG: 2 INJECTION INTRAMUSCULAR at 13:52

## 2021-04-12 ASSESSMENT — FIBROSIS 4 INDEX: FIB4 SCORE: 2.357022603955158415

## 2021-04-12 NOTE — DISCHARGE PLANNING
Anticipated Discharge Disposition: Psych Sr.    Action: She even tho severly confused per Eliz at  [facility] will need to sign at the consent to go to [facility] and that is based on [place] law and the family then signs her in there.  ER CM will get family consent on COBRA.If unable to get pt to sign then they are unable to take pt. ER MD and Alert team inappropriate for legal hold.Eliz got report from RN. Awaiting fax of paperwork. Will review with Mackenzie LANIERSupervisor as well. covid test pending. ER CM did explain SR [facility] program and attempted gather comprehesion.She is slightly calmer. Pt did sign but unclear comprehension. 2 RN consent with son.15:43 Acceptance Dr Mays.  [facility]. PCS sent. COBRA completed. Chart copy TUCKER fulton 17:00     Barriers to Discharge: Sr Psych process    Plan: Cont to follow.

## 2021-04-12 NOTE — DISCHARGE PLANNING
Anticipated Discharge Disposition: unknown    Action: Chart reviewed. Noted unclear if family has DPOA. Pt is confused. Appears would be appropriate for [facility] or [facility] SR program, no legal hold noted. May be too old for [facility] or [facility] at 80 year.     Barriers to Discharge: Bed availability, appropriateness, and consents.    Plan: Will refer to these. Will reach out to family to clarify If needs and decision making.

## 2021-04-12 NOTE — DISCHARGE PLANNING
Anticipated Discharge Disposition: Sr Psych    Action: Call back from Sr [facility]. Query which Covid test we have because they can only accept certain tests. Sent to charge for clarification and update     Barriers to Discharge: Need Covid test particular types    Plan: Cont to follow
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
## 2021-04-12 NOTE — ED NOTES
Med rec completed per pt's family  Allergies reviewed  No PO antibiotics in the last 14 days    Pt has recently been prescribed Depakote and Aricept but has not taken these medications in about 1 week

## 2021-04-12 NOTE — DISCHARGE PLANNING
Anticipated Discharge Disposition: Sr Psych vs Admission for placement    Action: Spoke with Eliz at  [facility] approx a hr ago and she was going to reach out to son. Spoke with Gege at [facility] just now and they have no beds in the Senior program and none anticipated due to space limitation with remodeling. If patient not accepted today to  [facility] will possibly need to admitted for safety evaluation and med adjustments.    Barriers to Discharge: Bed Availability.    Plan: Cont to follow closely

## 2021-04-12 NOTE — DISCHARGE PLANNING
Anticipated Discharge Disposition: Pending psych    Action: Calls to home phone for son Servando no answer initially and vm left for grand daughter she is working but returned call. Will try son again at her request.   Cont to try to follow with family to put in contact with  Psych programs for info and resources. They will need to be available to sign in and in agreement with services.   Review of home and needs. Son Servando he work nights , Antonio girard dtsteven works days , they  live with Olesya providing 24 hr care, mobile 1 story home 2 steps up with ramps also.Their goal to keep her in the home. Reviewed programs. He is very supportive.Their goal continue to be bringing her home with behaviors stabilized. They do have understanding of dementia progression and will be planning for longterm needs as well in future. Dtr Marifer Andres in [place] having brain surgery so will not involve in this at this time. Family will update her as appropriate.    Barriers to Discharge: Bed Availability.Acceptance into program vs   Plan: Cont to follow as needed

## 2021-04-12 NOTE — DISCHARGE PLANNING
Anticipated Discharge Disposition: Pending info    Action: Declined by [facility] and [facility] due to age and dementia. Will cont to follow with [facility] and with family and MD on options    Barriers to Discharge: Bed availability    Plan: Cont to follow closely
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
## 2021-04-12 NOTE — DISCHARGE PLANNING
Anticipated Discharge Disposition: Psych SR program vs admission    Action: Pt in ER still . VM left at 12:27 and now at  [facility] requesting status update. Review with ER staff    Barriers to Discharge: Bed availability    Plan: Cont to follow

## 2021-04-12 NOTE — ED NOTES
Pt very agitated after procedure of done COVID swab  MD and RN's were needed  Pt now ambulating fulton with walker slightly uncooperative  Calming re-instructions given to pt  However pt does not want to return to room   Staff at her side assisting her with ambulation

## 2021-04-13 NOTE — ED NOTES
Grand daughter called  She is aware of current situation  Waiting for transfer to [facility]  Pt in NAD

## 2021-04-13 NOTE — ED NOTES
Pt calm now, ate pudding by herself and tolerating PO fluids well   still rambling of words, non sensible language  Allowed vital signs to be taken  Tolerated by pt  Called [facility] and spoke to Jerome, gave him report   Waiting for [facility] for transport

## 2021-04-13 NOTE — DISCHARGE PLANNING
Anticipated Discharge Disposition: Transfer to [facility]    Action: Call from TUCKER that cont to run late wanting to push ETA back ERC Concerned that  [facility] would be unable to take at Shift change.Moved ETA to 8pm picked with TUCKER spoke with Alvino, Alvino is aware that [facility] can not take in shift change. Spoke india Dacosta at [facility] advised they cannot take  In shift change. Updated family, shiva Alvarez. She will call later to check on her.    Barriers to Discharge: Transport    Plan: Transport at 8 pm.

## 2021-04-13 NOTE — ED NOTES
Pt assisted to BR  Was incontinent of urine  Depends changed   Back to gurney  Positioned for comfort  In NAD

## 2021-04-13 NOTE — ED NOTES
TUCKER at  for transfer pt to [facility], called and informed them pt pt's impending arrival   Called and left message on Granddaughter Thomas ([phone]) phone per her request

## 2021-04-13 NOTE — DISCHARGE PLANNING
Update to Maria Del Carmen at [facility] TUCKER Gomez notes that they had a influx of calls causing a delay in transport. She understood. Updated charge.

## 2021-04-13 NOTE — ED NOTES
Pt dozing w/out distress on [facility]  Continue to wait for [facility] transport

## 2021-06-10 ENCOUNTER — TELEPHONE (OUTPATIENT)
Dept: SCHEDULING | Facility: IMAGING CENTER | Age: 81
End: 2021-06-10

## 2021-06-11 ENCOUNTER — HOME HEALTH ADMISSION (OUTPATIENT)
Dept: HOME HEALTH SERVICES | Facility: HOME HEALTHCARE | Age: 81
End: 2021-06-11
Payer: MEDICARE

## 2021-06-21 ENCOUNTER — OFFICE VISIT (OUTPATIENT)
Dept: MEDICAL GROUP | Facility: PHYSICIAN GROUP | Age: 81
End: 2021-06-21
Payer: MEDICARE

## 2021-06-21 VITALS
DIASTOLIC BLOOD PRESSURE: 60 MMHG | TEMPERATURE: 98.2 F | HEIGHT: 68 IN | RESPIRATION RATE: 14 BRPM | SYSTOLIC BLOOD PRESSURE: 128 MMHG | HEART RATE: 78 BPM | OXYGEN SATURATION: 98 % | WEIGHT: 177 LBS | BODY MASS INDEX: 26.83 KG/M2

## 2021-06-21 DIAGNOSIS — F03.91 DEMENTIA WITH BEHAVIORAL DISTURBANCE, UNSPECIFIED DEMENTIA TYPE: ICD-10-CM

## 2021-06-21 DIAGNOSIS — N18.31 STAGE 3A CHRONIC KIDNEY DISEASE: ICD-10-CM

## 2021-06-21 DIAGNOSIS — F51.8 OTHER SLEEP DISORDERS NOT DUE TO A SUBSTANCE OR KNOWN PHYSIOLOGICAL CONDITION: ICD-10-CM

## 2021-06-21 PROBLEM — G89.18 POSTOPERATIVE PAIN: Status: RESOLVED | Noted: 2019-02-11 | Resolved: 2021-06-21

## 2021-06-21 PROBLEM — Z96.642 STATUS POST LEFT HIP REPLACEMENT: Status: RESOLVED | Noted: 2019-02-11 | Resolved: 2021-06-21

## 2021-06-21 PROBLEM — I10 ESSENTIAL HYPERTENSION: Status: RESOLVED | Noted: 2018-05-03 | Resolved: 2021-06-21

## 2021-06-21 PROCEDURE — 93000 ELECTROCARDIOGRAM COMPLETE: CPT | Mod: GZ | Performed by: INTERNAL MEDICINE

## 2021-06-21 PROCEDURE — 99204 OFFICE O/P NEW MOD 45 MIN: CPT | Performed by: INTERNAL MEDICINE

## 2021-06-21 RX ORDER — CHLORPROMAZINE HYDROCHLORIDE 25 MG/1
TABLET, FILM COATED ORAL
COMMUNITY
Start: 2021-06-17

## 2021-06-21 RX ORDER — SULFAMETHOXAZOLE AND TRIMETHOPRIM 800; 160 MG/1; MG/1
TABLET ORAL
COMMUNITY
Start: 2021-06-16

## 2021-06-21 ASSESSMENT — FIBROSIS 4 INDEX: FIB4 SCORE: 2.357022603955158415

## 2021-06-21 ASSESSMENT — PATIENT HEALTH QUESTIONNAIRE - PHQ9: CLINICAL INTERPRETATION OF PHQ2 SCORE: 0

## 2021-06-22 NOTE — ASSESSMENT & PLAN NOTE
Chronic and ongoing problem.  She has a fair water drinker.  Avoid nephrotoxic agents as able.  GFR with wide ranges, sensitive to dehydration.
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
## 2021-06-22 NOTE — ASSESSMENT & PLAN NOTE
Chronic and decompensated problem.  Please see HPI for full details.  She now lives in a group home called [facility] in [place].  They are having issues with her behaviors.  We will place urgent consultations with neurology and psychiatry.  She had a very precipitous decline in her memory, does not appear she is ever formally met with neurology.  There are notes on the chart labeling frontotemporal dementia and others labeling Alzheimer's dementia.  She is having sundowning behavior, we completed an EKG in clinic to follow-up on her intervals and her QTC is 441 on Thorazine for the past few weeks.  Will reach out to geriatric specialty care who was seeing her before she went to the hospital to see if they would like to resume care as she would probably be better candidate for in-home primary care than coming to the clinic.  We will also make sure she gets in with home health, psychiatry, neurology to assist with medication adjustment.  May need to write for an as needed Seroquel or risperidone for agitation if it becomes violent.  Discussed black box warning with patient's daughter and caregiver and they voiced understanding.

## 2021-06-23 DIAGNOSIS — F03.91 DEMENTIA WITH BEHAVIORAL DISTURBANCE, UNSPECIFIED DEMENTIA TYPE: ICD-10-CM

## 2021-06-23 RX ORDER — QUETIAPINE FUMARATE 50 MG/1
TABLET, FILM COATED ORAL
Qty: 60 TABLET | Refills: 1 | Status: SHIPPED | OUTPATIENT
Start: 2021-06-23

## 2021-06-23 NOTE — PROGRESS NOTES
Jakob who is the caregiver at Olesya his group home came by the clinic today because she is having decompensation of her behaviors.  She is restless, she has significant sundowning, she stays up all night and then will sleep in the midmorning.  This is not sustainable.  Discussed risks alternatives to antipsychotic medications.  Discussed with her daughter Merna when we met earlier this week.  We will proceed with starting Seroquel 50 mg nightly and 50 mg as needed for agitation, max 100 mg/ 24 hour.  Gave information to Jakob to contact geriatric specialty care to reestablish care as she was working with them before her inpatient psychiatric hospitalization.  I think would be best for her to have very close follow-up in the home every week while titrating and adjusting these medications.  Brendon will call and update patient's son and power of  Servando.

## 2021-06-25 ENCOUNTER — APPOINTMENT (OUTPATIENT)
Dept: RADIOLOGY | Facility: MEDICAL CENTER | Age: 81
DRG: 308 | End: 2021-06-25
Attending: EMERGENCY MEDICINE
Payer: MEDICARE

## 2021-06-25 ENCOUNTER — HOSPITAL ENCOUNTER (INPATIENT)
Facility: MEDICAL CENTER | Age: 81
LOS: 1 days | DRG: 308 | End: 2021-06-25
Attending: EMERGENCY MEDICINE | Admitting: STUDENT IN AN ORGANIZED HEALTH CARE EDUCATION/TRAINING PROGRAM
Payer: MEDICARE

## 2021-06-25 VITALS
HEIGHT: 68 IN | SYSTOLIC BLOOD PRESSURE: 111 MMHG | RESPIRATION RATE: 14 BRPM | HEART RATE: 95 BPM | OXYGEN SATURATION: 94 % | DIASTOLIC BLOOD PRESSURE: 52 MMHG | BODY MASS INDEX: 30.31 KG/M2 | WEIGHT: 200 LBS | TEMPERATURE: 93.2 F

## 2021-06-25 PROBLEM — I48.91 AF (ATRIAL FIBRILLATION) (HCC): Status: ACTIVE | Noted: 2021-06-25

## 2021-06-25 PROBLEM — I46.9 CARDIAC ARREST (HCC): Status: ACTIVE | Noted: 2021-06-25

## 2021-06-25 PROBLEM — F03.90 DEMENTIA (HCC): Status: ACTIVE | Noted: 2021-06-25

## 2021-06-25 PROBLEM — R79.89 ELEVATED LFTS: Status: ACTIVE | Noted: 2021-06-25

## 2021-06-25 PROBLEM — E78.5 HYPERLIPIDEMIA: Status: ACTIVE | Noted: 2021-06-25

## 2021-06-25 PROBLEM — D53.9 MACROCYTIC ANEMIA: Status: ACTIVE | Noted: 2021-06-25

## 2021-06-25 PROBLEM — E87.20 METABOLIC ACIDOSIS: Status: ACTIVE | Noted: 2021-06-25

## 2021-06-25 PROBLEM — I10 ESSENTIAL HYPERTENSION: Status: ACTIVE | Noted: 2021-06-25

## 2021-06-25 LAB
ALBUMIN SERPL BCP-MCNC: 3 G/DL (ref 3.2–4.9)
ALBUMIN/GLOB SERPL: 1.4 G/DL
ALP SERPL-CCNC: 100 U/L (ref 30–99)
ALT SERPL-CCNC: 263 U/L (ref 2–50)
ANION GAP SERPL CALC-SCNC: 22 MMOL/L (ref 7–16)
ANISOCYTOSIS BLD QL SMEAR: ABNORMAL
AST SERPL-CCNC: 314 U/L (ref 12–45)
BASE EXCESS BLDA CALC-SCNC: -10 MMOL/L (ref -4–3)
BASE EXCESS BLDA CALC-SCNC: -19 MMOL/L (ref -4–3)
BASOPHILS # BLD AUTO: 0.9 % (ref 0–1.8)
BASOPHILS # BLD: 0.06 K/UL (ref 0–0.12)
BILIRUB SERPL-MCNC: 0.5 MG/DL (ref 0.1–1.5)
BODY TEMPERATURE: ABNORMAL DEGREES
BODY TEMPERATURE: ABNORMAL DEGREES
BUN SERPL-MCNC: 32 MG/DL (ref 8–22)
BURR CELLS BLD QL SMEAR: NORMAL
CALCIUM SERPL-MCNC: 8.8 MG/DL (ref 8.5–10.5)
CHLORIDE SERPL-SCNC: 106 MMOL/L (ref 96–112)
CO2 BLDA-SCNC: 16 MMOL/L (ref 20–33)
CO2 BLDA-SCNC: 20 MMOL/L (ref 20–33)
CO2 SERPL-SCNC: 13 MMOL/L (ref 20–33)
CREAT SERPL-MCNC: 1.26 MG/DL (ref 0.5–1.4)
EKG IMPRESSION: NORMAL
EOSINOPHIL # BLD AUTO: 0 K/UL (ref 0–0.51)
EOSINOPHIL NFR BLD: 0 % (ref 0–6.9)
ERYTHROCYTE [DISTWIDTH] IN BLOOD BY AUTOMATED COUNT: 57.1 FL (ref 35.9–50)
GLOBULIN SER CALC-MCNC: 2.2 G/DL (ref 1.9–3.5)
GLUCOSE SERPL-MCNC: 355 MG/DL (ref 65–99)
HCO3 BLDA-SCNC: 13.9 MMOL/L (ref 17–25)
HCO3 BLDA-SCNC: 18.1 MMOL/L (ref 17–25)
HCT VFR BLD AUTO: 34.4 % (ref 37–47)
HGB BLD-MCNC: 9.9 G/DL (ref 12–16)
HOROWITZ INDEX BLDA+IHG-RTO: 104 MM[HG]
HOROWITZ INDEX BLDA+IHG-RTO: 342 MM[HG]
LACTATE BLD-SCNC: 10.2 MMOL/L (ref 0.5–2)
LYMPHOCYTES # BLD AUTO: 1.85 K/UL (ref 1–4.8)
LYMPHOCYTES NFR BLD: 28.9 % (ref 22–41)
MACROCYTES BLD QL SMEAR: ABNORMAL
MAGNESIUM SERPL-MCNC: 2.8 MG/DL (ref 1.5–2.5)
MANUAL DIFF BLD: NORMAL
MCH RBC QN AUTO: 30.5 PG (ref 27–33)
MCHC RBC AUTO-ENTMCNC: 28.8 G/DL (ref 33.6–35)
MCV RBC AUTO: 105.8 FL (ref 81.4–97.8)
METAMYELOCYTES NFR BLD MANUAL: 2.6 %
MONOCYTES # BLD AUTO: 0.12 K/UL (ref 0–0.85)
MONOCYTES NFR BLD AUTO: 1.8 % (ref 0–13.4)
MORPHOLOGY BLD-IMP: NORMAL
MYELOCYTES NFR BLD MANUAL: 3.5 %
NEUTROPHILS # BLD AUTO: 3.99 K/UL (ref 2–7.15)
NEUTROPHILS NFR BLD: 60.5 % (ref 44–72)
NEUTS BAND NFR BLD MANUAL: 1.8 % (ref 0–10)
NRBC # BLD AUTO: 0.05 K/UL
NRBC BLD-RTO: 0.8 /100 WBC
O2/TOTAL GAS SETTING VFR VENT: 100 %
O2/TOTAL GAS SETTING VFR VENT: 60 %
PCO2 BLDA: 51.3 MMHG (ref 26–37)
PCO2 BLDA: 74.8 MMHG (ref 26–37)
PCO2 TEMP ADJ BLDA: 45 MMHG (ref 26–37)
PCO2 TEMP ADJ BLDA: 49.4 MMHG (ref 26–37)
PH BLDA: 6.88 [PH] (ref 7.4–7.5)
PH BLDA: 7.16 [PH] (ref 7.4–7.5)
PH TEMP ADJ BLDA: 6.99 [PH] (ref 7.4–7.5)
PH TEMP ADJ BLDA: 7.2 [PH] (ref 7.4–7.5)
PLATELET # BLD AUTO: 169 K/UL (ref 164–446)
PLATELET BLD QL SMEAR: NORMAL
PMV BLD AUTO: 9.9 FL (ref 9–12.9)
PO2 BLDA: 104 MMHG (ref 64–87)
PO2 BLDA: 205 MMHG (ref 64–87)
PO2 TEMP ADJ BLDA: 191 MMHG (ref 64–87)
PO2 TEMP ADJ BLDA: 60 MMHG (ref 64–87)
POIKILOCYTOSIS BLD QL SMEAR: NORMAL
POTASSIUM SERPL-SCNC: 4.1 MMOL/L (ref 3.6–5.5)
PROT SERPL-MCNC: 5.2 G/DL (ref 6–8.2)
RBC # BLD AUTO: 3.25 M/UL (ref 4.2–5.4)
RBC BLD AUTO: PRESENT
SAO2 % BLDA: 91 % (ref 93–99)
SAO2 % BLDA: 99 % (ref 93–99)
SODIUM SERPL-SCNC: 141 MMOL/L (ref 135–145)
SPECIMEN DRAWN FROM PATIENT: ABNORMAL
SPECIMEN DRAWN FROM PATIENT: ABNORMAL
TROPONIN T SERPL-MCNC: 44 NG/L (ref 6–19)
WBC # BLD AUTO: 6.4 K/UL (ref 4.8–10.8)

## 2021-06-25 PROCEDURE — 80053 COMPREHEN METABOLIC PANEL: CPT

## 2021-06-25 PROCEDURE — 0BH17EZ INSERTION OF ENDOTRACHEAL AIRWAY INTO TRACHEA, VIA NATURAL OR ARTIFICIAL OPENING: ICD-10-PCS | Performed by: STUDENT IN AN ORGANIZED HEALTH CARE EDUCATION/TRAINING PROGRAM

## 2021-06-25 PROCEDURE — 71045 X-RAY EXAM CHEST 1 VIEW: CPT

## 2021-06-25 PROCEDURE — 82803 BLOOD GASES ANY COMBINATION: CPT

## 2021-06-25 PROCEDURE — 96374 THER/PROPH/DIAG INJ IV PUSH: CPT

## 2021-06-25 PROCEDURE — 93005 ELECTROCARDIOGRAM TRACING: CPT

## 2021-06-25 PROCEDURE — 36600 WITHDRAWAL OF ARTERIAL BLOOD: CPT

## 2021-06-25 PROCEDURE — 96375 TX/PRO/DX INJ NEW DRUG ADDON: CPT

## 2021-06-25 PROCEDURE — 306637 HCHG MISC ORTHO ITEM RC 0274

## 2021-06-25 PROCEDURE — 700101 HCHG RX REV CODE 250: Performed by: EMERGENCY MEDICINE

## 2021-06-25 PROCEDURE — 5A1935Z RESPIRATORY VENTILATION, LESS THAN 24 CONSECUTIVE HOURS: ICD-10-PCS | Performed by: STUDENT IN AN ORGANIZED HEALTH CARE EDUCATION/TRAINING PROGRAM

## 2021-06-25 PROCEDURE — 700111 HCHG RX REV CODE 636 W/ 250 OVERRIDE (IP): Performed by: INTERNAL MEDICINE

## 2021-06-25 PROCEDURE — C1751 CATH, INF, PER/CENT/MIDLINE: HCPCS

## 2021-06-25 PROCEDURE — 99291 CRITICAL CARE FIRST HOUR: CPT

## 2021-06-25 PROCEDURE — 70450 CT HEAD/BRAIN W/O DYE: CPT | Mod: ME

## 2021-06-25 PROCEDURE — 83605 ASSAY OF LACTIC ACID: CPT

## 2021-06-25 PROCEDURE — 93005 ELECTROCARDIOGRAM TRACING: CPT | Performed by: EMERGENCY MEDICINE

## 2021-06-25 PROCEDURE — 92950 HEART/LUNG RESUSCITATION CPR: CPT

## 2021-06-25 PROCEDURE — 36556 INSERT NON-TUNNEL CV CATH: CPT

## 2021-06-25 PROCEDURE — 85027 COMPLETE CBC AUTOMATED: CPT

## 2021-06-25 PROCEDURE — 99223 1ST HOSP IP/OBS HIGH 75: CPT | Mod: AI | Performed by: STUDENT IN AN ORGANIZED HEALTH CARE EDUCATION/TRAINING PROGRAM

## 2021-06-25 PROCEDURE — 83735 ASSAY OF MAGNESIUM: CPT

## 2021-06-25 PROCEDURE — 73600 X-RAY EXAM OF ANKLE: CPT | Mod: LT

## 2021-06-25 PROCEDURE — 99291 CRITICAL CARE FIRST HOUR: CPT | Performed by: INTERNAL MEDICINE

## 2021-06-25 PROCEDURE — 94002 VENT MGMT INPAT INIT DAY: CPT

## 2021-06-25 PROCEDURE — 36415 COLL VENOUS BLD VENIPUNCTURE: CPT

## 2021-06-25 PROCEDURE — 84484 ASSAY OF TROPONIN QUANT: CPT

## 2021-06-25 PROCEDURE — 51702 INSERT TEMP BLADDER CATH: CPT

## 2021-06-25 PROCEDURE — 303105 HCHG CATHETER EXTRA

## 2021-06-25 PROCEDURE — 85007 BL SMEAR W/DIFF WBC COUNT: CPT

## 2021-06-25 PROCEDURE — 700111 HCHG RX REV CODE 636 W/ 250 OVERRIDE (IP): Performed by: EMERGENCY MEDICINE

## 2021-06-25 RX ORDER — ONDANSETRON 2 MG/ML
4 INJECTION INTRAMUSCULAR; INTRAVENOUS EVERY 4 HOURS PRN
Status: DISCONTINUED | OUTPATIENT
Start: 2021-06-25 | End: 2021-06-25

## 2021-06-25 RX ORDER — ONDANSETRON 4 MG/1
4 TABLET, ORALLY DISINTEGRATING ORAL EVERY 4 HOURS PRN
Status: DISCONTINUED | OUTPATIENT
Start: 2021-06-25 | End: 2021-06-25

## 2021-06-25 RX ORDER — SODIUM CHLORIDE 9 MG/ML
1000 INJECTION, SOLUTION INTRAVENOUS ONCE
Status: DISCONTINUED | OUTPATIENT
Start: 2021-06-25 | End: 2021-06-25

## 2021-06-25 RX ORDER — SODIUM CHLORIDE 9 MG/ML
INJECTION, SOLUTION INTRAVENOUS CONTINUOUS
Status: DISCONTINUED | OUTPATIENT
Start: 2021-06-25 | End: 2021-06-25

## 2021-06-25 RX ORDER — QUETIAPINE FUMARATE 50 MG/1
50 TABLET, FILM COATED ORAL SEE ADMIN INSTRUCTIONS
COMMUNITY

## 2021-06-25 RX ORDER — DEXTROSE MONOHYDRATE 25 G/50ML
50 INJECTION, SOLUTION INTRAVENOUS
Status: DISCONTINUED | OUTPATIENT
Start: 2021-06-25 | End: 2021-06-25

## 2021-06-25 RX ORDER — HYDROMORPHONE HYDROCHLORIDE 2 MG/ML
4 INJECTION, SOLUTION INTRAMUSCULAR; INTRAVENOUS; SUBCUTANEOUS
Status: DISCONTINUED | OUTPATIENT
Start: 2021-06-25 | End: 2021-06-25 | Stop reason: HOSPADM

## 2021-06-25 RX ORDER — HEPARIN SODIUM 5000 [USP'U]/ML
5000 INJECTION, SOLUTION INTRAVENOUS; SUBCUTANEOUS EVERY 8 HOURS
Status: DISCONTINUED | OUTPATIENT
Start: 2021-06-25 | End: 2021-06-25

## 2021-06-25 RX ORDER — 0.9 % SODIUM CHLORIDE 0.9 %
10 INTRAVENOUS SOLUTION INTRAVENOUS
Status: DISCONTINUED | OUTPATIENT
Start: 2021-06-25 | End: 2021-06-25

## 2021-06-25 RX ORDER — MEPERIDINE HYDROCHLORIDE 50 MG/ML
25 INJECTION INTRAMUSCULAR; INTRAVENOUS; SUBCUTANEOUS
Status: DISCONTINUED | OUTPATIENT
Start: 2021-06-25 | End: 2021-06-25

## 2021-06-25 RX ORDER — NOREPINEPHRINE BITARTRATE 0.03 MG/ML
0-30 INJECTION, SOLUTION INTRAVENOUS CONTINUOUS
Status: DISCONTINUED | OUTPATIENT
Start: 2021-06-25 | End: 2021-06-25

## 2021-06-25 RX ORDER — ATROPINE SULFATE 10 MG/ML
2 SOLUTION/ DROPS OPHTHALMIC EVERY 4 HOURS PRN
Status: DISCONTINUED | OUTPATIENT
Start: 2021-06-25 | End: 2021-06-25 | Stop reason: HOSPADM

## 2021-06-25 RX ORDER — SULFAMETHOXAZOLE AND TRIMETHOPRIM 800; 160 MG/1; MG/1
1 TABLET ORAL 2 TIMES DAILY
COMMUNITY

## 2021-06-25 RX ORDER — ACETAMINOPHEN 325 MG/1
650 TABLET ORAL EVERY 6 HOURS PRN
Status: DISCONTINUED | OUTPATIENT
Start: 2021-06-25 | End: 2021-06-25

## 2021-06-25 RX ORDER — DEXTROSE MONOHYDRATE 25 G/50ML
25-50 INJECTION, SOLUTION INTRAVENOUS PRN
Status: DISCONTINUED | OUTPATIENT
Start: 2021-06-25 | End: 2021-06-25

## 2021-06-25 RX ORDER — AMOXICILLIN 250 MG
2 CAPSULE ORAL 2 TIMES DAILY
Status: DISCONTINUED | OUTPATIENT
Start: 2021-06-25 | End: 2021-06-25

## 2021-06-25 RX ORDER — MIDAZOLAM HYDROCHLORIDE 1 MG/ML
2 INJECTION INTRAMUSCULAR; INTRAVENOUS ONCE
Status: DISCONTINUED | OUTPATIENT
Start: 2021-06-25 | End: 2021-06-25

## 2021-06-25 RX ORDER — POLYETHYLENE GLYCOL 3350 17 G/17G
1 POWDER, FOR SOLUTION ORAL
Status: DISCONTINUED | OUTPATIENT
Start: 2021-06-25 | End: 2021-06-25

## 2021-06-25 RX ORDER — MORPHINE SULFATE 10 MG/ML
5 INJECTION, SOLUTION INTRAMUSCULAR; INTRAVENOUS
Status: DISCONTINUED | OUTPATIENT
Start: 2021-06-25 | End: 2021-06-25 | Stop reason: HOSPADM

## 2021-06-25 RX ORDER — LORAZEPAM 2 MG/ML
1 INJECTION INTRAMUSCULAR
Status: DISCONTINUED | OUTPATIENT
Start: 2021-06-25 | End: 2021-06-25 | Stop reason: HOSPADM

## 2021-06-25 RX ORDER — LORAZEPAM 2 MG/ML
1 CONCENTRATE ORAL
Status: DISCONTINUED | OUTPATIENT
Start: 2021-06-25 | End: 2021-06-25 | Stop reason: HOSPADM

## 2021-06-25 RX ORDER — CHLORPROMAZINE HYDROCHLORIDE 25 MG/1
25 TABLET, FILM COATED ORAL
COMMUNITY

## 2021-06-25 RX ORDER — BISACODYL 10 MG
10 SUPPOSITORY, RECTAL RECTAL
Status: DISCONTINUED | OUTPATIENT
Start: 2021-06-25 | End: 2021-06-25

## 2021-06-25 RX ORDER — EPINEPHRINE HCL IN 0.9 % NACL 4MG/250ML
0-20 PLASTIC BAG, INJECTION (ML) INTRAVENOUS CONTINUOUS
Status: DISCONTINUED | OUTPATIENT
Start: 2021-06-25 | End: 2021-06-25

## 2021-06-25 RX ORDER — 0.9 % SODIUM CHLORIDE 0.9 %
20 INTRAVENOUS SOLUTION INTRAVENOUS ONCE
Status: DISCONTINUED | OUTPATIENT
Start: 2021-06-25 | End: 2021-06-25

## 2021-06-25 RX ADMIN — MORPHINE SULFATE 5 MG: 10 INJECTION INTRAVENOUS at 15:40

## 2021-06-25 RX ADMIN — NOREPINEPHRINE BITARTRATE 10 MCG/MIN: 1 INJECTION INTRAVENOUS at 11:49

## 2021-06-25 RX ADMIN — EPINEPHRINE 10 MCG/MIN: 1 INJECTION INTRAMUSCULAR; INTRAVENOUS; SUBCUTANEOUS at 11:57

## 2021-06-25 RX ADMIN — LORAZEPAM 1 MG: 2 INJECTION INTRAMUSCULAR; INTRAVENOUS at 15:40

## 2021-06-25 RX ADMIN — SODIUM BICARBONATE 50 MEQ: 84 INJECTION, SOLUTION INTRAVENOUS at 13:00

## 2021-06-25 NOTE — PROGRESS NOTES
Was called to evaluate this patient in [place] [facility]. She came in to ED after being found obtunded and unresponsive by her son. She is a resident of [facility] due to her dementia and has been ok until 1030 this AM. EMS was called and CPR was initiated on the field. Patient had 2 rounds of CPR with 2 doses of EPI on the field and she again arrested in the ED five times. She was able to achieve ROSC, however is on norepi and epi drip. She is intubated.    On physical exam, patient is intubated, has pinpoint fixed nonreactive pupils, no gag reflex, and no corneal reflex. She is not on any sedation at this time. Her son and granddaughter are at bedside.    Family and I had a lengthy discussion about prognosis and goal of care in the family conference room. Son stated that he would like the patient to be DNR and comfort care decision was to be made when his sister arrived from [place]. Her sister arrive approximately 40 minutes after and saw the patient at bedside. Family agreed that they did not want aggressive measures at this time and allow patient to be comfortable. Patient was initiated on comfort care measures, morphine and ativan were given prior to extubation, robinul to be given, RT at bedside and extubated the patient. Patient's lines were removed and  called to bedside. Emotional support was given and family to notify MD at any time if they had further questions.    Total critical care time spent = 35 minutes

## 2021-06-25 NOTE — ED TRIAGE NOTES
Pt BIB [facility] with c/c of cardiac arrest. Pt was found her bathroom today where she was last seen approx 5-10 minutes before family found her in the bathroom unresponsive. Upon EMS arrival pt was noted to be in PEA, CPR was started and pt given 1mg of epi. ROSC was achieved and pt was intubated. In route here pt had lost pulses twice and given 2 additional pushes of epi. On arrival here pt noted to again be in PEA. CPR was started at 1142. Pt given 1mg of epi at 1143 Pulse check at 1144 pt still in PEA. Pt given CA push at 1144. CPR continued and ROSC was achieved at 1146. Pt in afib. Norepi started at 1149. Pt was cardioverted at 1152 by ERP. afib again noted. 1156 pt was started on an epi drip and cardioverted again at 1156. Pt noted to lose pulses at 1200 and in PEA.CPR was started and pt given 1mg of epi. 1202 ROSC was achieved. Pt again cardioverted by ERP at 1206. Report to Dinora. RN  2 IV established and blood drawn and sent.

## 2021-06-25 NOTE — ED NOTES
1200: Care assumed from MAXIMINO Wagner   1205: Synchronized cardioversion per Dr. Velazquez for , no change following cardioversion   1207: EPI down to 8   1220: BP and Hr decreased. EPI up to 12   1245: Bicarb push - 50   1249: R IJ central line per dr Velazquez

## 2021-06-25 NOTE — ED NOTES
Deonte from Lab called with critical result of Lactic Acid 10.2 at 1253. Critical lab result read back to Deonte.   Dr. Jorge notified of critical lab result at 1253.  Critical lab result read back by Dr. Jorge.

## 2021-06-25 NOTE — H&P
Hospital Medicine History & Physical Note    Date of Service  6/25/2021    Primary Care Physician  Samantha Wade D.O.    Consultants  Intensive care    Code Status  Comfort Care/DNR    Chief Complaint  Chief Complaint   Patient presents with   • Cardiac Arrest       History of Presenting Illness  History obtained from charts, ERP, granddaughter, Ms. Abhijit Moss at bedside.   80 y.o. female with h/o severe dementia, HTN, HLD, recent UTI who presented 6/25/2021 post cardiac arrest. Patient lives at a group home. Patient was found by her son in a shower sitting on a chair, unresponsive, not breathing. Patient's son started chest compressions and called 911. EMS arrived quickly and continued ACLS for PEA. ROSC was achieved, total downtime since founding the patient unresponsive till ROSC about 10 minutes. Patient was intubate on the field. En route and in ED patient had multiple episodes of PEA, requiring CPR, epi with subsequent ROSCs. Then patient had Afib, requiring cardioversion x3.  Patient is unresponsive, pupils are dilated, fixed, non-responsive to light. No corneal, gag reflexes, no withdrawal to pain. Patient is not on sedative.  Intensivist was consulted.  Family is made the patient DNR/DNI, waiting for daughter to arrive from [place]. Family is considering compassionate extubation after daughter arrives.  Daughter arrived, family made the patient comfort care and we will compasionatly extubate.    Review of Systems  Review of Systems   Unable to perform ROS: Medical condition       Past Medical History   has no past medical history on file.    Surgical History   has no past surgical history on file.     Family History  family history is not on file.     Social History       Allergies  Allergies   Allergen Reactions   • Codeine Vomiting   • Penicillins Unspecified     Per family, unknown reaction       Medications  Prior to Admission Medications   Prescriptions Last Dose Informant Patient  Reported? Taking?   QUEtiapine (SEROQUEL) 50 MG tablet 6/24/2021 at 1500 Caregiver Yes Yes   Sig: Take 50 mg by mouth see administration instructions. Take 1 tablet (50 mg) by mouth every day at 1500. May take an additional 1 tablet as needed for agitation.   chlorproMAZINE (THORAZINE) 25 MG Tab 6/24/2021 at 1700 Caregiver Yes Yes   Sig: Take 25 mg by mouth with dinner.   sulfamethoxazole-trimethoprim (BACTRIM DS) 800-160 MG tablet 6/20/2021 at FINISHED Caregiver Yes Yes   Sig: Take 1 tablet by mouth 2 times a day. 6/17/2021 - 6/20/2021. FINISHED.      Facility-Administered Medications: None       Physical Exam  Temp:  [33.9 °C (93 °F)-34.4 °C (93.9 °F)] 34 °C (93.2 °F)  Pulse:  [0-156] 95  Resp:  [10-18] 14  BP: ()/(18-79) 111/52  SpO2:  [90 %-100 %] 94 %    Physical Exam  Vitals and nursing note reviewed.   Constitutional:       Appearance: She is ill-appearing.   HENT:      Head: Normocephalic.      Mouth/Throat:      Mouth: Mucous membranes are moist.   Eyes:      Comments: Pupils are dilated, fixed, not-responsive to light   Cardiovascular:      Rate and Rhythm: Normal rate.      Heart sounds: Murmur (harsh systolo-diastolic murmur) heard.     Pulmonary:      Comments: Mechanical ventilator sounds, b/l good air entry  Abdominal:      General: Abdomen is flat. Bowel sounds are normal.   Musculoskeletal:         General: Normal range of motion.      Cervical back: Normal range of motion.   Skin:     General: Skin is warm.      Comments: Has some bruises on arms   Neurological:      Comments: Patient is unresponsive, pupils are dilated, fixed, non-responsive to light. No corneal, gag reflexes, no withdrawal to pain. Patient is not on sedative.         Laboratory:  Recent Labs     06/25/21  1140   WBC 6.4   RBC 3.25*   HEMOGLOBIN 9.9*   HEMATOCRIT 34.4*   .8*   MCH 30.5   MCHC 28.8*   RDW 57.1*   PLATELETCT 169   MPV 9.9     Recent Labs     06/25/21  1140   SODIUM 141   POTASSIUM 4.1   CHLORIDE 106    CO2 13*   GLUCOSE 355*   BUN 32*   CREATININE 1.26   CALCIUM 8.8     Recent Labs     06/25/21  1140   ALTSGPT 263*   ASTSGOT 314*   ALKPHOSPHAT 100*   TBILIRUBIN 0.5   GLUCOSE 355*         No results for input(s): NTPROBNP in the last 72 hours.      Recent Labs     06/25/21  1140   TROPONINT 44*       Imaging:  DX-CHEST-LIMITED (1 VIEW)   Final Result      1.  No evidence of pneumothorax following RIGHT neck catheter placement   2.  Unchanged RIGHT upper lobe airspace disease   3.  Enteric tube tip again appears at the gastroesophageal junction, suggest advancing      CT-HEAD W/O   Final Result      1.  No acute intracranial process.      2.  Atrophy and moderate small vessel ischemic change.      DX-CHEST-PORTABLE (1 VIEW)   Final Result      1.  RIGHT upper lobe airspace disease which could be pulmonary edema, pneumonia or contusion if there were chest compressions   2.  Enteric tube tip projects over the gastroesophageal junction, suggest advancing   3.  Atherosclerosis      DX-ANKLE 2- VIEWS LEFT   Final Result      1.  No fracture or dislocation of LEFT ankle.   2.  Minimal widening of the medial mortise, nonspecific.  Ligamentous injury is not excluded.   3.  Diffuse soft tissue swelling.            Assessment/Plan:  I anticipate this patient will require at least two midnights for appropriate medical management, necessitating inpatient admission.    * Cardiac arrest (HCC)  Assessment & Plan  Unknown reason at this time  Cardiac arrest in group home  CPR started by son  EMS achieved ROSC  Multiple PEA  Cardioverstion x3 for Afib  Per family, DNR/DNI, comfort care, compassionate extubation    AF (atrial fibrillation) (HCC)  Assessment & Plan  Comfort care    Hyperlipidemia  Assessment & Plan  Comfort care    Essential hypertension  Assessment & Plan  Comfort care    Dementia (HCC)  Assessment & Plan  Severely demented  Comfort care    Elevated LFTs  Assessment & Plan  Secondary to shock liver  Comfort  care    Metabolic acidosis  Assessment & Plan  [facility]  2/2 cardiac arrest  Comfort care

## 2021-06-25 NOTE — ED PROVIDER NOTES
ED Provider Note    CHIEF COMPLAINT  Chief Complaint   Patient presents with   • Cardiac Arrest       HPI  Alfredo Matthews is a 80 y.o. female who presents for evaluation of out-of-hospital cardiac arrest.  The patient was brought in from a memory care facility.  Apparently she has a history of dementia, diabetes and hypertension.  Apparently the son was there visiting.  She apparently was awake and had a normal morning.  At around 10 30-11 the son found her and she was obtunded.  Paramedics were called within 5 minutes and CPR was initiated in the field.  Paramedics reportedly did 2 rounds of CPR with 2 doses of epinephrine intubated her in the field and got return of spontaneous circulation.  On arrival here the patient had a narrow complex rhythm that appeared to be irregular.  History is obtained from paramedics as the patient cannot provide any meaningful history.    REVIEW OF SYSTEMS  See [facility] [place] for further details.  Unavailable all other systems are negative.     PAST MEDICAL HISTORY  No past medical history on file.  Hypertension dementia diabetes  FAMILY HISTORY  Unknown    SOCIAL HISTORY  Social History     Socioeconomic History   • Marital status:      Spouse name: Not on file   • Number of children: Not on file   • Years of education: Not on file   • Highest education level: Not on file   Occupational History   • Not on file   Tobacco Use   • Smoking status: Not on file   Substance and Sexual Activity   • Alcohol use: Not on file   • Drug use: Not on file   • Sexual activity: Not on file   Other Topics Concern   • Not on file   Social History Narrative   • Not on file     Social Determinants of Health     Financial Resource Strain:    • Difficulty of Paying Living Expenses:    Food Insecurity:    • Worried About Running Out of Food in the Last Year:    • Ran Out of Food in the Last Year:    Transportation Needs:    • Lack of Transportation (Medical):    • Lack of Transportation (Non-Medical):   "  Physical Activity:    • Days of Exercise per Week:    • Minutes of Exercise per Session:    Stress:    • Feeling of Stress :    Social Connections:    • Frequency of Communication with Friends and Family:    • Frequency of Social Gatherings with Friends and Family:    • Attends Rastafari Services:    • Active Member of Clubs or Organizations:    • Attends Club or Organization Meetings:    • Marital Status:    Intimate Partner Violence:    • Fear of Current or Ex-Partner:    • Emotionally Abused:    • Physically Abused:    • Sexually Abused:      Unknown  SURGICAL HISTORY  No past surgical history on file.  Unknown  CURRENT MEDICATIONS  Home Medications     Reviewed by Tayla Horn (Pharmacy Tech) on 06/25/21 at 1230  Med List Status: Complete   Medication Last Dose Status   chlorproMAZINE (THORAZINE) 25 MG Tab 6/24/2021 Active   QUEtiapine (SEROQUEL) 50 MG tablet 6/24/2021 Active   sulfamethoxazole-trimethoprim (BACTRIM DS) 800-160 MG tablet 6/20/2021 Active                ALLERGIES  Allergies   Allergen Reactions   • Codeine Vomiting   • Penicillins Unspecified     Per family, unknown reaction       PHYSICAL EXAM  VITAL SIGNS: BP (!) 92/56   Pulse (!) 102   Resp 18   Ht 1.727 m (5' 8\")   Wt 90.7 kg (200 lb)   SpO2 92%   BMI 30.41 kg/m²       Constitutional: Ill.  Obtunded intubated  HENT: Normocephalic, Atraumatic, Bilateral external ears normal, Oropharynx moist, No oral exudates, 7.5 endotracheal tube at 24 cm at the lips  Eyes: Pupils 6 mm fixed and dilated EOMI, Conjunctiva normal, No discharge.   Neck: Normal range of motion, No tenderness, Supple, No stridor.   Cardiovascular: Tachycardic irregularly irregular no murmurs, No rubs, No gallops.   Thorax & Lungs: Rhonchorous respirations with ventilations  Abdomen: Bowel sounds normal, Soft, No tenderness, No masses, No pulsatile masses.   Skin: Pale, cool, Dry, No erythema, No rash.   Back: No tenderness, No CVA tenderness.   Extremities: Thready " distal pulses, No edema, ecchymosis and bruising noted to the left foot   neurologic: GCS 3 T     Results for orders placed or performed during the hospital encounter of 06/25/21   CBC WITH DIFFERENTIAL   Result Value Ref Range    WBC 6.4 4.8 - 10.8 K/uL    RBC 3.25 (L) 4.20 - 5.40 M/uL    Hemoglobin 9.9 (L) 12.0 - 16.0 g/dL    Hematocrit 34.4 (L) 37.0 - 47.0 %    .8 (H) 81.4 - 97.8 fL    MCH 30.5 27.0 - 33.0 pg    MCHC 28.8 (L) 33.6 - 35.0 g/dL    RDW 57.1 (H) 35.9 - 50.0 fL    Platelet Count 169 164 - 446 K/uL    MPV 9.9 9.0 - 12.9 fL    Neutrophils-Polys 60.50 44.00 - 72.00 %    Lymphocytes 28.90 22.00 - 41.00 %    Monocytes 1.80 0.00 - 13.40 %    Eosinophils 0.00 0.00 - 6.90 %    Basophils 0.90 0.00 - 1.80 %    Nucleated RBC 0.80 /100 WBC    Neutrophils (Absolute) 3.99 2.00 - 7.15 K/uL    Lymphs (Absolute) 1.85 1.00 - 4.80 K/uL    Monos (Absolute) 0.12 0.00 - 0.85 K/uL    Eos (Absolute) 0.00 0.00 - 0.51 K/uL    Baso (Absolute) 0.06 0.00 - 0.12 K/uL    NRBC (Absolute) 0.05 K/uL    Anisocytosis 1+     Macrocytosis 1+    TROPONIN   Result Value Ref Range    Troponin T 44 (H) 6 - 19 ng/L   Comp Metabolic Panel   Result Value Ref Range    Sodium 141 135 - 145 mmol/L    Potassium 4.1 3.6 - 5.5 mmol/L    Chloride 106 96 - 112 mmol/L    Co2 13 (L) 20 - 33 mmol/L    Anion Gap 22.0 (H) 7.0 - 16.0    Glucose 355 (H) 65 - 99 mg/dL    Bun 32 (H) 8 - 22 mg/dL    Creatinine 1.26 0.50 - 1.40 mg/dL    Calcium 8.8 8.5 - 10.5 mg/dL    AST(SGOT) 314 (H) 12 - 45 U/L    ALT(SGPT) 263 (H) 2 - 50 U/L    Alkaline Phosphatase 100 (H) 30 - 99 U/L    Total Bilirubin 0.5 0.1 - 1.5 mg/dL    Albumin 3.0 (L) 3.2 - 4.9 g/dL    Total Protein 5.2 (L) 6.0 - 8.2 g/dL    Globulin 2.2 1.9 - 3.5 g/dL    A-G Ratio 1.4 g/dL   MAGNESIUM   Result Value Ref Range    Magnesium 2.8 (H) 1.5 - 2.5 mg/dL   LACTIC ACID   Result Value Ref Range    Lactic Acid 10.2 (HH) 0.5 - 2.0 mmol/L   ESTIMATED GFR   Result Value Ref Range    GFR If  49  (A) >60 mL/min/1.73 m 2    GFR If Non  41 (A) >60 mL/min/1.73 m 2   DIFFERENTIAL MANUAL   Result Value Ref Range    Bands-Stabs 1.80 0.00 - 10.00 %    Metamyelocytes 2.60 %    Myelocytes 3.50 %    Manual Diff Status PERFORMED    PERIPHERAL SMEAR REVIEW   Result Value Ref Range    Peripheral Smear Review see below    PLATELET ESTIMATE   Result Value Ref Range    Plt Estimation Normal    MORPHOLOGY   Result Value Ref Range    RBC Morphology Present     Poikilocytosis 2+     Echinocytes 2+    EKG (NOW)   Result Value Ref Range    Report       [facility]    Test Date:  2021  Pt Name:    GOSHEN FIVE                  Department: ER  MRN:        [id number]                      Room:       RD 04  Gender:                                  Technician:  :        [id number]                   Requested By:ER TRIAGE PROTOCOL  Order #:    [id number]                    Reading MD:    Measurements  Intervals                                Axis  Rate:       112                          P:  MD:                                      QRS:        -85  QRSD:       153                          T:          -27  QT:         387  QTc:        529    Interpretive Statements  Atrial fibrillation  RBBB and LAFB  ST depression, consider ischemia, diffuse lds  No previous ECG available for comparison     POCT arterial blood gas device results   Result Value Ref Range    Ph 6.878 (LL) 7.400 - 7.500    Pco2 74.8 (HH) 26.0 - 37.0 mmHg    Po2 104 (H) 64 - 87 mmHg    Tco2 16 (L) 20 - 33 mmol/L    S02 91 (L) 93 - 99 %    Hco3 13.9 (L) 17.0 - 25.0 mmol/L    BE -19 (L) -4 - 3 mmol/L    Body Temp 27.5 C degrees    O2 Therapy 100 %    iPF Ratio 104     Ph Temp Jayjay 6.986 (LL) 7.400 - 7.500    Pco2 Temp Co 49.4 (H) 26.0 - 37.0 mmHg    Po2 Temp Cor 60 (L) 64 - 87 mmHg    Specimen Arterial    POCT arterial blood gas device results   Result Value Ref Range    Ph 7.157 (LL) 7.400 - 7.500    Pco2 51.3 (HH)  26.0 - 37.0 mmHg    Po2 205 (H) 64 - 87 mmHg    Tco2 20 20 - 33 mmol/L    S02 99 93 - 99 %    Hco3 18.1 17.0 - 25.0 mmol/L    BE -10 (L) -4 - 3 mmol/L    Body Temp 34.0 C degrees    O2 Therapy 60 %    iPF Ratio 342     Ph Temp Jayjay 7.196 (LL) 7.400 - 7.500    Pco2 Temp Co 45.0 (H) 26.0 - 37.0 mmHg    Po2 Temp Cor 191 (H) 64 - 87 mmHg    Specimen Arterial       DX-CHEST-LIMITED (1 VIEW)   Final Result      1.  No evidence of pneumothorax following RIGHT neck catheter placement   2.  Unchanged RIGHT upper lobe airspace disease   3.  Enteric tube tip again appears at the gastroesophageal junction, suggest advancing      CT-HEAD W/O   Final Result      1.  No acute intracranial process.      2.  Atrophy and moderate small vessel ischemic change.      DX-CHEST-PORTABLE (1 VIEW)   Final Result      1.  RIGHT upper lobe airspace disease which could be pulmonary edema, pneumonia or contusion if there were chest compressions   2.  Enteric tube tip projects over the gastroesophageal junction, suggest advancing   3.  Atherosclerosis      DX-ANKLE 2- VIEWS LEFT   Final Result      1.  No fracture or dislocation of LEFT ankle.   2.  Minimal widening of the medial mortise, nonspecific.  Ligamentous injury is not excluded.   3.  Diffuse soft tissue swelling.            Physician procedure: Central venous catheter indication critically ill patient requiring central vasopressors.  Emergent consent was obtained.  The right anterior neck was prepped with Betadine x3.  Sterile prep and drape.  Sterile gown and gloves were used.  Using dynamic real-time ultrasound the right internal jugular vein was visualized with an ultrasound probe.  Using standard Seldinger technique the vessel was cannulated and Seldinger wire was advanced without resistance.  The tract was dilated and the catheter was placed over the wire and the wire was removed.  The line was sewn in the place.  All ports were flushed and determined to be functional.  Sterile  dressing applied.  Chest x-ray was performed and confirmed placement with no complications    COURSE & MEDICAL DECISION MAKING  Pertinent Labs & Imaging studies reviewed. (See chart for details)  Patient presented here with a very thready pulse that was irregularly irregular.  Please see nursing notes for further details.  Briefly the patient did lose her pulse on several occasions and underwent approximately 5 rounds of CPR with additional doses of epinephrine.  We also administered IV calcium and boluses of bicarbonate.  Patient was ultimately started on Levophed that was insufficient followed by an epinephrine infusion.  Central venous catheter was placed.  The patient did have runs of rapid A. fib for which we attempted synchronized cardioversion initially with 100 J and then again with 150 J with no improvement.  The patient ultimately became stable and converted to a sinus rhythm spontaneously.  CT scan of the head does not demonstrate any acute process.  Consultation with the family was obtained.  The patient is in guarded and critical condition.  There is a very clear likelihood that she could be brain dead and this is a nonsurvivable resuscitation.  Consultation with the intensivist as well as the internal medicine team was obtained.  I counseled the family that her prognosis is exceedingly poor.  We will admit her to the ICU and give it 12 to 24 hours to see if she has any functional recovery.  Based on her poor prognosis the intensivist recommended against therapeutic hypothermia.  The patient will be admitted to the ICU in guarded condition    CRITICAL CARE TIME:    The patient required approximately 45 minutes worth of critical care time. This excludes any procedures. This includes time spent directly at caring for the patient, making critical medical decisions, involving consultants and speaking with the family.    FINAL IMPRESSION  1.  PEA arrest with return of spontaneous circulation  2.  Severe  lactic acidemia  3.  Severe metabolic acidosis  4.  Acute respiratory failure requiring intubation         Electronically signed by: Emiliano Jorge M.D., 6/25/2021 2:26 PM

## 2021-06-25 NOTE — ASSESSMENT & PLAN NOTE
Unknown reason at this time  Cardiac arrest in group home  CPR started by son  EMS achieved ROSC  Multiple PEA  Cardioverstion x3 for Afib  Per family, DNR/DNI, comfort care, compassionate extubation

## 2021-06-25 NOTE — DISCHARGE PLANNING
Pt's son Servando ([phone]) and Pt's primary caregiver Brendon ([phone]). Pt is a resident at [facility] group home ([phone]).     MSW met with pt's son and provided general update. Pt's son states pt does not have a DNR or advanced directive. MSW escorted pt's son and granddaughter to pt's room. Will await update from ERP.

## 2021-06-25 NOTE — DISCHARGE PLANNING
MSW met with pt's family and provided mortuary information. Family to call MSW when they have made a decisions. No other needs at this time.

## 2021-06-26 NOTE — DISCHARGE SUMMARY
Death Summary    Cause of Death  Acute cardiorespiratory failure due to cardiac arrest due to Afib.    Comorbid Conditions at the Time of Death  Principal Problem:    Cardiac arrest (HCC) POA: Unknown  Active Problems:    Macrocytic anemia POA: Unknown      Overview: Hb 9.9      .8      Comfort care    Metabolic acidosis POA: Unknown    Elevated LFTs POA: Unknown    Dementia (HCC) POA: Unknown    Essential hypertension POA: Unknown    Hyperlipidemia POA: Unknown    AF (atrial fibrillation) (HCC) POA: Unknown  Resolved Problems:    * No resolved hospital problems. *      History of Presenting Illness and Hospital Course  History obtained from charts, ERP, granddaughter, Ms. Abhijit Moss at bedside.   80 y.o. female with h/o severe dementia, HTN, HLD, recent UTI who presented 2021 post cardiac arrest. Patient lives at a group home. Patient was found by her son in a shower sitting on a chair, unresponsive, not breathing. Patient's son started chest compressions and called 911. EMS arrived quickly and continued ACLS for PEA. ROSC was achieved, total downtime since founding the patient unresponsive till ROSC about 10 minutes. Patient was intubate on the field. En route and in ED patient had multiple episodes of PEA, requiring CPR, epi with subsequent ROSCs. Then patient had Afib, requiring cardioversion x3.  Patient is unresponsive, pupils are dilated, fixed, non-responsive to light. No corneal, gag reflexes, no withdrawal to pain. Patient is not on sedative.  Family made the patient DNR/DNI. Family made the patient comfort care.   Patient was compassionately extubated and  soon after extubation.    Death Date: 21   Death Time: 1620      Pronounced By (MD): Otilio  Pronounced By (RN1): Yessi RETANA  Pronounced By (RN2): Dinora GARZA

## 2021-07-15 DIAGNOSIS — F03.91 DEMENTIA WITH BEHAVIORAL DISTURBANCE, UNSPECIFIED DEMENTIA TYPE: ICD-10-CM

## 2021-07-15 RX ORDER — QUETIAPINE FUMARATE 50 MG/1
TABLET, FILM COATED ORAL
Qty: 60 TABLET | Refills: 1 | OUTPATIENT
Start: 2021-07-15
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
## (undated) DEVICE — DRAPE IOBAN II INCISE 23X17 - (10EA/BX 4BX/CA)

## (undated) DEVICE — Device

## (undated) DEVICE — SENSOR SPO2 NEO LNCS ADHESIVE (20/BX) SEE USER NOTES

## (undated) DEVICE — GLOVE BIOGEL INDICATOR SZ 8 SURGICAL PF LTX - (50/BX 4BX/CA)

## (undated) DEVICE — CANISTER SUCTION 3000ML MECHANICAL FILTER AUTO SHUTOFF MEDI-VAC NONSTERILE LF DISP  (40EA/CA)

## (undated) DEVICE — PACK TOTAL HIP - (1/CA)

## (undated) DEVICE — TUBING CLEARLINK DUO-VENT - C-FLO (48EA/CA)

## (undated) DEVICE — SET LEADWIRE 5 LEAD BEDSIDE DISPOSABLE ECG (1SET OF 5/EA)

## (undated) DEVICE — WATER IRRIG. STER. 1500 ML - (9/CA)

## (undated) DEVICE — STOCKINET TUBULAR 6IN STERILE - 6 X 48YDS (25/CA)

## (undated) DEVICE — CHLORAPREP 26 ML APPLICATOR - ORANGE TINT(25/CA)

## (undated) DEVICE — BLADE SAGITTAL SAW DUAL CUT 75.0 X 25.0MM (1/EA)

## (undated) DEVICE — GLOVE BIOGEL PI ORTHO SZ 6 1/2 SURGICAL PF LF (40PR/BX)

## (undated) DEVICE — PROTECTOR ULNA NERVE - (36PR/CA)

## (undated) DEVICE — SUTURE GENERAL

## (undated) DEVICE — BLANKET WARMING UPPER BODY - (10/CA)

## (undated) DEVICE — GLOVE SZ 7 BIOGEL PI MICRO - PF LF (50PR/BX 4BX/CA)

## (undated) DEVICE — GOWN WARMING STANDARD FLEX - (30/CA)

## (undated) DEVICE — CLOSURE SKIN STRIP 1/2 X 4 IN - (STERI STRIP) (50/BX 4BX/CA)

## (undated) DEVICE — NEEDLE SPINAL NON-SAFETY 20 GA X 3 IN (25/BX)

## (undated) DEVICE — LACTATED RINGERS INJ 1000 ML - (14EA/CA 60CA/PF)

## (undated) DEVICE — ELECTRODE 850 FOAM ADHESIVE - HYDROGEL RADIOTRNSPRNT (50/PK)

## (undated) DEVICE — PEN SKIN MARKER W/RULER - (50EA/BX)

## (undated) DEVICE — STERI STRIP COMPOUND BENZOIN - TINCTURE 0.6ML WITH APPLICATOR (40EA/BX)

## (undated) DEVICE — KIT ROOM DECONTAMINATION

## (undated) DEVICE — GLOVE BIOGEL SZ 6 PF LATEX - (50EA/BX 4BX/CA)

## (undated) DEVICE — SODIUM CHL. IRRIGATION 0.9% 3000ML (4EA/CA 65CA/PF)

## (undated) DEVICE — CONTAINER SPECIMEN BAG OR - STERILE 4 OZ W/LID (100EA/CA)

## (undated) DEVICE — HANDPIECE 10FT INTPLS SCT PLS IRRIGATION HAND CONTROL SET (6/PK)

## (undated) DEVICE — SLEEVE, VASO, THIGH, MED

## (undated) DEVICE — GLOVE BIOGEL INDICATOR SZ 7.5 SURGICAL PF LTX - (50PR/BX 4BX/CA)

## (undated) DEVICE — HEAD HOLDER JUNIOR/ADULT

## (undated) DEVICE — DRAPE U ORTHOPEDIC - (10/BX)

## (undated) DEVICE — ELECTRODE DUAL RETURN W/ CORD - (50/PK)

## (undated) DEVICE — MASK ANESTHESIA ADULT  - (100/CA)

## (undated) DEVICE — SUTURE 3-0 MONOCRYL PLUS PS-1 - 27 INCH (36/BX)

## (undated) DEVICE — TRAY CATHETER FOLEY URINE METER W/STATLOCK 350ML (10EA/CA)

## (undated) DEVICE — SET EXTENSION WITH 2 PORTS (48EA/CA) ***PART #2C8610 IS A SUBSTITUTE*****

## (undated) DEVICE — NEPTUNE 4 PORT MANIFOLD - (20/PK)

## (undated) DEVICE — SUCTION INSTRUMENT YANKAUER BULBOUS TIP W/O VENT (50EA/CA)

## (undated) DEVICE — KIT ANESTHESIA W/CIRCUIT & 3/LT BAG W/FILTER (20EA/CA)

## (undated) DEVICE — SUTURE 1 VICRYL PLUS CTX - 8 X 18 INCH (12/BX)

## (undated) DEVICE — TOWELS CLOTH SURGICAL - (4/PK 20PK/CA)

## (undated) DEVICE — GLOVE BIOGEL SZ 8 SURGICAL PF LTX - (50PR/BX 4BX/CA)

## (undated) DEVICE — GLOVE BIOGEL ECLIPSE PF LATEX SIZE 8.0  (50PR/BX)

## (undated) DEVICE — PAD UNIVERSAL MULTI USE (1/EA)

## (undated) DEVICE — WIRE GUIDE R-T TRIGEN 3.2MM (1EA)

## (undated) DEVICE — GLOVE BIOGEL PI INDICATOR SZ 6.5 SURGICAL PF LF - (50/BX 4BX/CA)

## (undated) DEVICE — GLOVE BIOGEL INDICATOR SZ 8.5 SURGICAL PF LTX - (50/BX 4BX/CA)

## (undated) DEVICE — LENS/HOOD FOR SPACESUIT - (32/PK) PEEL AWAY FACE

## (undated) DEVICE — SYRINGE 10 ML CONTROL LL (25EA/BX 4BX/CA)

## (undated) DEVICE — GLOVE BIOGEL PI INDICATOR SZ 7.5 SURGICAL PF LF -(50/BX 4BX/CA)

## (undated) DEVICE — SUTURE 2-0 VICRYL PLUS CT-1 - 8 X 18 INCH(12/BX)

## (undated) DEVICE — GLOVE BIOGEL PI INDICATOR SZ 7.0 SURGICAL PF LF - (50/BX 4BX/CA)

## (undated) DEVICE — DRAPE MAYO STAND - (30/CA)

## (undated) DEVICE — MASK, LARYNGEAL AIRWAY #4

## (undated) DEVICE — TRAY SPINAL ANESTHESIA NON-SAFETY (10/CA)

## (undated) DEVICE — SPONGE GAUZESTER 4 X 4 4PLY - (128PK/CA)

## (undated) DEVICE — NEEDLE SPINAL NON-SAFETY 18 GA X 3 IN (25EA/BX)

## (undated) DEVICE — SWAB ANAEROBIC SPEC.COLLECTOR - (25/PK 4PK/CA 100EA/CA)

## (undated) DEVICE — GLOVE BIOGEL INDICATOR SZ 7SURGICAL PF LTX - (50/BX 4BX/CA)

## (undated) DEVICE — BANDAID SHEER STRIP 3/4 IN (100EA/BX 12BX/CA)

## (undated) DEVICE — GLOVE BIOGEL SZ 6.5 SURGICAL PF LTX (50PR/BX 4BX/CA)

## (undated) DEVICE — SYRINGE 20 ML LL (50EA/BX 4BX/CA)

## (undated) DEVICE — PAD LAP STERILE 18 X 18 - (5/PK 40PK/CA)

## (undated) DEVICE — SOD. CHL. INJ. 0.9% 1000 ML - (14EA/CA 60CA/PF)

## (undated) DEVICE — TIP INTPLS HFLO ML ORFC BTRY - (12/CS)  FOR SURGILAV

## (undated) DEVICE — TUBE, CULTURE AEROBIC

## (undated) DEVICE — DRESSING TRANSPARENT FILM TEGADERM 4 X 4.75" (50EA/BX)"

## (undated) DEVICE — TUBE E-T HI-LO CUFF 7.0MM (10EA/PK)

## (undated) DEVICE — SODIUM CHL IRRIGATION 0.9% 1000ML (12EA/CA)

## (undated) DEVICE — GOWN SURGEONS X-LARGE - DISP. (30/CA)

## (undated) DEVICE — BRUSH FMCNL TIP IRR STRL - (12/PKG) FOR SURGILAV